# Patient Record
Sex: FEMALE | Race: WHITE | NOT HISPANIC OR LATINO | Employment: OTHER | URBAN - METROPOLITAN AREA
[De-identification: names, ages, dates, MRNs, and addresses within clinical notes are randomized per-mention and may not be internally consistent; named-entity substitution may affect disease eponyms.]

---

## 2017-01-23 ENCOUNTER — PATIENT OUTREACH (OUTPATIENT)
Dept: HEALTH INFORMATION MANAGEMENT | Facility: OTHER | Age: 72
End: 2017-01-23

## 2017-03-08 ENCOUNTER — OFFICE VISIT (OUTPATIENT)
Dept: MEDICAL GROUP | Facility: MEDICAL CENTER | Age: 72
End: 2017-03-08
Payer: MEDICARE

## 2017-03-08 VITALS
DIASTOLIC BLOOD PRESSURE: 58 MMHG | OXYGEN SATURATION: 96 % | TEMPERATURE: 97.2 F | WEIGHT: 126 LBS | SYSTOLIC BLOOD PRESSURE: 120 MMHG | BODY MASS INDEX: 23.19 KG/M2 | HEIGHT: 62 IN | HEART RATE: 71 BPM

## 2017-03-08 DIAGNOSIS — Z12.31 ENCOUNTER FOR SCREENING MAMMOGRAM FOR MALIGNANT NEOPLASM OF BREAST: ICD-10-CM

## 2017-03-08 DIAGNOSIS — E78.5 HYPERLIPIDEMIA LDL GOAL <100: ICD-10-CM

## 2017-03-08 DIAGNOSIS — M75.41 SHOULDER IMPINGEMENT SYNDROME, RIGHT: ICD-10-CM

## 2017-03-08 DIAGNOSIS — G47.33 OSA (OBSTRUCTIVE SLEEP APNEA): ICD-10-CM

## 2017-03-08 DIAGNOSIS — M75.121 COMPLETE TEAR OF RIGHT ROTATOR CUFF: ICD-10-CM

## 2017-03-08 DIAGNOSIS — S52.102D CLOSED FRACTURE OF PROXIMAL END OF LEFT RADIUS WITH ROUTINE HEALING, UNSPECIFIED FRACTURE MORPHOLOGY, SUBSEQUENT ENCOUNTER: ICD-10-CM

## 2017-03-08 DIAGNOSIS — H81.13 BPPV (BENIGN PAROXYSMAL POSITIONAL VERTIGO), BILATERAL: ICD-10-CM

## 2017-03-08 PROCEDURE — G0439 PPPS, SUBSEQ VISIT: HCPCS | Performed by: NURSE PRACTITIONER

## 2017-03-08 ASSESSMENT — PATIENT HEALTH QUESTIONNAIRE - PHQ9: CLINICAL INTERPRETATION OF PHQ2 SCORE: 0

## 2017-03-08 ASSESSMENT — PAIN SCALES - GENERAL: PAINLEVEL: NO PAIN

## 2017-03-08 NOTE — ASSESSMENT & PLAN NOTE
She is followed by pulm for her sleep apnea.  Well controlled on CPAP.  Last saw pulm with updated equip in jan.

## 2017-03-08 NOTE — ASSESSMENT & PLAN NOTE
She has healed from her surgery on 3/1/16.  She is  Now ready to restart all activitiy.  Her surgeon was Omar

## 2017-03-08 NOTE — PROGRESS NOTES
Subjective:      Hallie Fisher is a 72 y.o. female who presents with No chief complaint on file.            HPI  Seen in f/u for HRA.  She just back from a week of skiing.  She is having left side pain.  Using aleve.  Getting better.  No SOB.  Didn't fall with skiing.    She went to [place] this year with big group.  She is going to start playing tennis again. S he is going to restart tennis now that her shoulder is healed.  Is going to restart pickle ball.   She is on weight watchers long term.  She is stable on her diet.    She is having a lot of stress wiht her ill mother in law in hospice.    She is due refills on meds  She is due lab for her chol.    She is due mammo.      Hyperlipidemia LDL goal <100  Her last LP in 2015 showed good trg and HDL.  LDL was elevated at 118.  She is taking her lipitor approp.  No se to meds.  She is due updated lab.     MONIK (obstructive sleep apnea)  She is followed by pulm for her sleep apnea.  Well controlled on CPAP.  Last saw pulm with updated equip in jan.      Closed fracture of left radius with routine healing  All sx resolved.  No current sx or tx needed    Torn rotator cuff  She has healed from her surgery on 3/1/16.  She is  Now ready to restart all activitiy.  Her surgeon was Omar MOCTEZUMAPV (benign paroxysmal positional vertigo)  No recent attacks.  No current tx needed    Shoulder impingement syndrome  All sx resolved with surgery 3/1/16            HPI:  Hallie is a 72 y.o. here for Medicare Annual Wellness Visit     Patient Active Problem List    Diagnosis Date Noted   • Shoulder impingement syndrome 03/01/2016   • Torn rotator cuff    • BPPV (benign paroxysmal positional vertigo)    • Closed fracture of left radius with routine healing 09/04/2013   • MONIK (obstructive sleep apnea) 08/19/2013   • Hyperlipidemia        Current Outpatient Prescriptions   Medication Sig Dispense Refill   • atorvastatin (LIPITOR) 10 MG Tab Take 1 Tab by mouth every day.  (Patient taking differently: Take 10 mg by mouth every 48 hours.) 30 Tab 11   • ascorbic acid (VITAMIN C) 500 MG tablet Take 1 Tab by mouth every day. (Patient taking differently: Take 500 mg by mouth every 48 hours.) 30 Each 3   • Multiple Vitamins-Minerals (CENTRUM SILVER) TABS Take  by mouth every day.     • Magnesium 400 MG CAPS Take  by mouth every 48 hours as needed.     • Calcium Carbonate-Vitamin D (CALCIUM 600 + D PO) Take  by mouth every day.     • Cholecalciferol (VITAMIN D) 2000 UNITS CAPS Take  by mouth every day.     • meclizine (ANTIVERT) 25 MG TABS Take 1 Tab by mouth 3 times a day as needed for Dizziness. 60 Tab 2     No current facility-administered medications for this visit.            Current supplements as per medication list.       Allergies: Penicillins    Current social contact/activities:   1.  Skiing  2.  Walking  3.  tennis     She  reports that she has never smoked. She has never used smokeless tobacco. She reports that she drinks alcohol. She reports that she does not use illicit drugs.  Counseling given: Yes        DPA/Advanced directive: Patient does have an advanced directive. If not on file, instructed to bring in a copy to scan into their chart. If no advanced directive exists, a packet and workshop information was given on advanced directives.     ROS:    Gait: Uses no assistive device   Ostomy: no   Other tubes: no   Amputations: no   Chronic oxygen use no - uses CPAP  Last eye exam 2 months ago   : Denies incontinence.       Screening:    Depression Screening    Little interest or pleasure in doing things?  0 - not at all  Feeling down, depressed , or hopeless? 0 - not at all  Trouble falling or staying asleep, or sleeping too much?     Feeling tired or having little energy?     Poor appetite or overeating?     Feeling bad about yourself - or that you are a failure or have let yourself or your family down?    Trouble concentrating on things, such as reading the newspaper or  watching television?    Moving or speaking so slowly that other people could have noticed.  Or the opposite - being so fidgety or restless that you have been moving around a lot more than usual?     Thoughts that you would be better off dead, or of hurting yourself?     Patient Health Questionnaire Score:      If depressive symptoms identified deferred to follow up visit unless specifically addressed in assesment and plan.      Screening for Cognitive Impairment    Three Minute Recall (banana, sunrise, fence)  3/3    Draw clock face with all 12 numbers set to the hand to show 10 minures past 11 o'clock  1    Cognitive concerns identified defferred for follow up unless specifically addressed in assesment and plan.    Fall Risk Assessment    Has the patient had two or more falls in the last year or any fall with injury in the last year?  No    Safety Assessment    Throw rugs on floor.  Yes  Handrails on all stairs.  Yes  Good lighting in all hallways.  Yes  Difficulty hearing.  No  Patient counseled about all safety risks that were identified.    Functional Assessment ADLs    Are there any barriers preventing you from cooking for yourself or meeting nutritional needs?  No.    Are there any barriers preventing you from driving safely or obtaining transportation?  No.    Are there any barriers preventing you from using a telephone or calling for help?  No.    Are there any barriers preventing you from shopping?  No.    Are there any barriers preventing you from taking care of your own finances?  No.    Are there any barriers preventing you from managing your medications?  No.    Are currently engaing any exercise or physical activity?  Yes.       Health Maintenance Summary                Annual Wellness Visit Overdue 5/30/2015      Done 5/29/2014     MAMMOGRAM Postponed 1/23/2018 Originally 11/6/2016. Patient Refused     Done 11/6/2015 MA-SCREEN MAMMO W/CAD-BILAT     Patient has more history with this topic...    BONE  DENSITY Next Due 11/6/2020      Done 11/6/2015 DS-BONE DENSITY STUDY (DEXA)     Patient has more history with this topic...    COLONOSCOPY Next Due 7/11/2022      Done 7/11/2012 REFERRAL TO GI FOR COLONOSCOPY     Patient has more history with this topic...    IMM DTaP/Tdap/Td Vaccine Next Due 8/19/2023      Done 8/19/2013 Imm Admin: Tdap Vaccine          Patient Care Team:  BLAKE Pang as PCP - General (Family Medicine)  BLAKE Mckeon as Consulting Physician (Family Medicine)  Cayetano as Respiratory      Social History   Substance Use Topics   • Smoking status: Never Smoker    • Smokeless tobacco: Never Used   • Alcohol Use: Yes      Comment: one per week or once a month      Family History   Problem Relation Age of Onset   • Lung Disease Mother      emphysema   • Alcohol/Drug Mother    • Cancer Father      colon   • Alcohol/Drug Father    • Stroke Brother      She  has a past medical history of Hyperlipidemia; Sleep apnea; Closed fracture of radius; Torn rotator cuff; BPPV (benign paroxysmal positional vertigo); Anesthesia; and Shoulder impingement syndrome.   Past Surgical History   Procedure Laterality Date   • Tonsillectomy  as child   • Knee arthroscopy Left 2009      - Dr. Newman; left   • Wrist orif  9/4/2013     Performed by Prasanna Anthony M.D. at [facility]   • Carpal tunnel release  9/4/2013     Performed by Prasanna Anthony M.D. at [facility]   • Lumpectomy Right 1970's     right breast cyst removal   • Shoulder decompression arthroscopic Right 3/1/2016     Procedure: SHOULDER DECOMPRESSION ARTHROSCOPIC - SUBACROMIAL, LABRAL DEBRIDMENT;  Surgeon: Shama Nelson M.D.;  Location: [facility];  Service:    • Shoulder arthroscopy w/ rotator cuff repair Right 3/1/2016     Procedure: SHOULDER ARTHROSCOPY W/ ROTATOR CUFF REPAIR;  Surgeon: Shama Nelson M.D.;  Location: [facility];  Service:        Exam:     Blood pressure 120/58,  "pulse 71, temperature 36.2 °C (97.2 °F), height 1.562 m (5' 1.5\"), weight 57.153 kg (126 lb), SpO2 96 %, not currently breastfeeding. Body mass index is 23.42 kg/(m^2).    Hearing excellent.    Dentition good  Alert, oriented in no acute distress.  Eye contact is good, speech goal directed, affect calm      Services needed: as per orders if indicated.  Health Care Screening: Age-appropriate preventive services Medicare covers discussed today and ordered if indicated.    Referrals offered: Community-based lifestyle interventions to reduce health risks and promote self-management and wellness, fall prevention, nutrition, physical activity, tobacco-use cessation, weight loss, and mental health services as per orders if indicated.    Discussion today about general wellness and lifestyle habits:    · Prevent falls and reduce trip hazards; Cautioned about securing or removing rugs.  · Have a working fire alarm and carbon monoxide detector;   · Engage in regular physical activity and social activities       Patient Active Problem List    Diagnosis Date Noted   • Shoulder impingement syndrome 03/01/2016   • Torn rotator cuff    • BPPV (benign paroxysmal positional vertigo)    • Closed fracture of left radius with routine healing 09/04/2013   • MONIK (obstructive sleep apnea) 08/19/2013   • Hyperlipidemia      Current Outpatient Prescriptions   Medication Sig Dispense Refill   • atorvastatin (LIPITOR) 10 MG Tab Take 1 Tab by mouth every day. (Patient taking differently: Take 10 mg by mouth every 48 hours.) 30 Tab 11   • ascorbic acid (VITAMIN C) 500 MG tablet Take 1 Tab by mouth every day. (Patient taking differently: Take 500 mg by mouth every 48 hours.) 30 Each 3   • Multiple Vitamins-Minerals (CENTRUM SILVER) TABS Take  by mouth every day.     • Magnesium 400 MG CAPS Take  by mouth every 48 hours as needed.     • Calcium Carbonate-Vitamin D (CALCIUM 600 + D PO) Take  by mouth every day.     • Cholecalciferol (VITAMIN D) " "2000 UNITS CAPS Take  by mouth every day.     • meclizine (ANTIVERT) 25 MG TABS Take 1 Tab by mouth 3 times a day as needed for Dizziness. 60 Tab 2     No current facility-administered medications for this visit.     Allergies   Allergen Reactions   • Penicillins Hives       ROS  Review of Systems   Constitutional: Negative.  Negative for fever, chills, weight loss, malaise/fatigue and diaphoresis.   HENT: Negative.  Negative for hearing loss, ear pain, nosebleeds, congestion, sore throat, neck pain, tinnitus and ear discharge.    Eyes: Negative.  Negative for blurred vision, double vision, photophobia, pain, discharge and redness.   Respiratory: Negative.  Negative for cough, hemoptysis, sputum production, shortness of breath, wheezing and stridor.    Cardiovascular: Negative.  Negative for chest pain, palpitations, orthopnea, claudication, leg swelling and PND.   Gastrointestinal: Negative.  Negative for heartburn, nausea, vomiting, abdominal pain, diarrhea, constipation, blood in stool and melena.  freq belching with eating with inc stress.   Genitourinary: Negative.  Negative for dysuria, urgency, frequency, incontinence, hematuria and flank pain.   Musculoskeletal: Negative.  Negative for myalgias, joint pain and falls.   Skin: Negative.  Negative for itching and rash.   Neurological: Negative.  Negative for dizziness, tingling, tremors, sensory change, speech change, focal weakness, seizures, loss of consciousness, weakness and headaches.   Endo/Heme/Allergies: Negative.  Negative for environmental allergies and polydipsia. Does not bruise/bleed easily.   Psychiatric/Behavioral: Negative.  Negative for depression, suicidal ideas, hallucinations, memory loss and substance abuse. The patient is some nervous/anxious and insomnia d/t recent family stress.    All other systems reviewed and are negative.           Objective:     /58 mmHg  Pulse 71  Temp(Src) 36.2 °C (97.2 °F)  Ht 1.562 m (5' 1.5\")  Wt " 57.153 kg (126 lb)  BMI 23.42 kg/m2  SpO2 96%  Breastfeeding? No     Physical Exam      Physical Exam   Vitals reviewed.  Constitutional: oriented to person, place, and time. appears well-developed and well-nourished. No distress.   HENT: Head: Normocephalic and atraumatic. Bilateral tympanic membranes wnl w/o bulging.  Right Ear: External ear normal. Left Ear: External ear normal. Nose: Nose normal.  Mouth/Throat: Oropharynx is clear and moist. No oropharyngeal exudate. nedra tm wnl. Eyes: Conjunctivae and EOM are normal. Pupils are equal, round, and reactive to light. Right eye exhibits no discharge. Left eye exhibits no discharge. No scleral icterus.    Neck: Normal range of motion. Neck supple. No JVD present.   Cardiovascular: Normal rate, regular rhythm, normal heart sounds and intact distal pulses.  Exam reveals no gallop and no friction rub.  No murmur heard.  No carotid bruits   Pulmonary/Chest: Effort normal and breath sounds normal. No stridor. No respiratory distress. no wheezes or rales. exhibits no tenderness.   Abdominal: Soft. Bowel sounds are normal. exhibits no distension and no mass. No tenderness. no rebound and no guarding.   Musculoskeletal: Normal range of motion. exhibits no edema or tenderness.  nedra pedal pulses 2+.  Lymphadenopathy:  no cervical or supraclavicular adenopathy.   Neurological: alert and oriented to person, place, and time. has normal reflexes. displays normal reflexes. No cranial nerve deficit. exhibits normal muscle tone. Coordination normal.   Skin: Skin is warm and dry. No rash noted. no diaphoresis. No erythema. No pallor.   Psychiatric: normal mood and affect. behavior is normal.            Assessment/Plan:     1. Hyperlipidemia LDL goal <100  Annual Wellness Visit - Includes PPPS Subsequent ()    COMP METABOLIC PANEL    LIPID PROFILE    recheck lab.  taking meds approp.  f/u with pt with results and yearly or sooner if lab abn   2. MONIK (obstructive sleep apnea)   Annual Wellness Visit - Includes PPPS Subsequent ()    controlled and followed by pulm   3. Closed fracture of proximal end of left radius with routine healing, unspecified fracture morphology, subsequent encounter  Annual Wellness Visit - Includes PPPS Subsequent ()    all sx resolved.  no tx needed   4. Complete tear of right rotator cuff  Annual Wellness Visit - Includes PPPS Subsequent ()    all sx resolved.  no tx needed   5. BPPV (benign paroxysmal positional vertigo), bilateral  Annual Wellness Visit - Includes PPPS Subsequent ()    all sx resolved.  no tx needed   6. Shoulder impingement syndrome, right  Annual Wellness Visit - Includes PPPS Subsequent ()    all sx resolved.  no tx needed   7. Encounter for screening mammogram for malignant neoplasm of breast  MA-SCREEN MAMMO W/CAD-BILAT

## 2017-03-08 NOTE — ASSESSMENT & PLAN NOTE
Her last LP in 2015 showed good trg and HDL.  LDL was elevated at 118.  She is taking her lipitor approp.  No se to meds.  She is due updated lab.

## 2017-03-08 NOTE — MR AVS SNAPSHOT
"        Hallie Moet   3/8/2017 10:00 AM   Office Visit   MRN: [id number]    Department:  [facility]   Dept Phone:  [phone]    Description:  Female : 1945   Provider:  BLAKE Pang           Allergies as of 3/8/2017     Allergen Noted Reactions    Penicillins 2016   Hives      You were diagnosed with     Hyperlipidemia LDL goal <100   [306970]   recheck lab.  taking meds approp.  f/u with pt with results and yearly or sooner if lab abn    MONIK (obstructive sleep apnea)   [[id number]]   controlled and followed by pulm    Closed fracture of proximal end of left radius with routine healing, unspecified fracture morphology, subsequent encounter   [[id number]]   all sx resolved.  no tx needed    Complete tear of right rotator cuff   [[id number]]   all sx resolved.  no tx needed    BPPV (benign paroxysmal positional vertigo), bilateral   [[id number]]   all sx resolved.  no tx needed    Shoulder impingement syndrome, right   [[id number]]   all sx resolved.  no tx needed    Encounter for screening mammogram for malignant neoplasm of breast   [[id number]]         Vital Signs     Blood Pressure Pulse Temperature Height Weight Body Mass Index    120/58 mmHg 71 36.2 °C (97.2 °F) 1.562 m (5' 1.5\") 57.153 kg (126 lb) 23.42 kg/m2    Oxygen Saturation Breastfeeding? Smoking Status             96% No Never Smoker          Basic Information     Date Of Birth Sex Race Ethnicity Preferred Language    1945 Female White Non- English      Problem List              ICD-10-CM Priority Class Noted - Resolved    MONIK (obstructive sleep apnea) G47.33   2013 - Present    Closed fracture of left radius with routine healing S52.92XD   2013 - Present    Torn rotator cuff M75.100   Unknown - Present    BPPV (benign paroxysmal positional vertigo) H81.10   Unknown - Present    Shoulder impingement syndrome M75.40   3/1/2016 - Present    Hyperlipidemia LDL goal <100 E78.5   3/8/2017 - Present      Health " Maintenance        Date Due Completion Dates    MAMMOGRAM 1/23/2018 (Originally 11/6/2016) 11/6/2015, 6/5/2014, 5/11/2012, 11/18/2009, 11/18/2009    BONE DENSITY 11/6/2020 11/6/2015, 5/11/2012    COLONOSCOPY 7/11/2022 7/11/2012, 1/1/2012 (Done)    Override on 1/1/2012: Done    IMM DTaP/Tdap/Td Vaccine (2 - Td) 8/19/2023 8/19/2013            Current Immunizations     13-VALENT PCV PREVNAR 2/19/2016    Influenza Vaccine Adult HD 9/11/2015, 9/27/2014    Influenza Vaccine Quad Inj (Pf) 10/12/2016    Pneumococcal polysaccharide vaccine (PPSV-23) 8/19/2013    SHINGLES VACCINE 5/23/2014    Tdap Vaccine 8/19/2013      Below and/or attached are the medications your provider expects you to take. Review all of your home medications and newly ordered medications with your provider and/or pharmacist. Follow medication instructions as directed by your provider and/or pharmacist. Please keep your medication list with you and share with your provider. Update the information when medications are discontinued, doses are changed, or new medications (including over-the-counter products) are added; and carry medication information at all times in the event of emergency situations     Allergies:  PENICILLINS - Hives               Medications  Valid as of: March 08, 2017 - 11:11 AM    Generic Name Brand Name Tablet Size Instructions for use    Ascorbic Acid (Tab) VITAMIN C 500 MG Take 1 Tab by mouth every day.        Atorvastatin Calcium (Tab) LIPITOR 10 MG Take 1 Tab by mouth every day.        Calcium Carb-Cholecalciferol   Take  by mouth every day.        Cholecalciferol (Cap) Vitamin D 2000 UNITS Take  by mouth every day.        Magnesium (Cap) Magnesium 400 MG Take  by mouth every 48 hours as needed.        Meclizine HCl (Tab) ANTIVERT 25 MG Take 1 Tab by mouth 3 times a day as needed for Dizziness.        Multiple Vitamins-Minerals (Tab) CENTRUM SILVER  Take  by mouth every day.        .                 Medicines prescribed today  were sent to:     [id number] - [place], [place] - [address]    [address] [place] [address]    Phone: [phone] Fax: [phone]    Open 24 Hours?: No      Medication refill instructions:       If your prescription bottle indicates you have medication refills left, it is not necessary to call your provider’s office. Please contact your pharmacy and they will refill your medication.    If your prescription bottle indicates you do not have any refills left, you may request refills at any time through one of the following ways: The online iNeed system (except Urgent Care), by calling your provider’s office, or by asking your pharmacy to contact your provider’s office with a refill request. Medication refills are processed only during regular business hours and may not be available until the next business day. Your provider may request additional information or to have a follow-up visit with you prior to refilling your medication.   *Please Note: Medication refills are assigned a new Rx number when refilled electronically. Your pharmacy may indicate that no refills were authorized even though a new prescription for the same medication is available at the pharmacy. Please request the medicine by name with the pharmacy before contacting your provider for a refill.        Your To Do List     Future Labs/Procedures Complete By Expires    COMP METABOLIC PANEL  As directed 3/9/2018    LIPID PROFILE  As directed 3/9/2018    MA-SCREEN MAMMO W/CAD-BILAT  As directed 4/9/2018      Other Notes About Your Plan     This patient has an appointment on 02/19/2016. There are no queries at this time.           iNeed Access Code: Activation code not generated  Current iNeed Status: Active

## 2017-03-10 ENCOUNTER — HOSPITAL ENCOUNTER (OUTPATIENT)
Dept: LAB | Facility: MEDICAL CENTER | Age: 72
End: 2017-03-10
Attending: NURSE PRACTITIONER
Payer: MEDICARE

## 2017-03-10 DIAGNOSIS — E78.5 HYPERLIPIDEMIA, UNSPECIFIED HYPERLIPIDEMIA TYPE: ICD-10-CM

## 2017-03-10 DIAGNOSIS — H81.10 BPPV (BENIGN PAROXYSMAL POSITIONAL VERTIGO), UNSPECIFIED LATERALITY: ICD-10-CM

## 2017-03-10 DIAGNOSIS — E78.5 HYPERLIPIDEMIA LDL GOAL <100: ICD-10-CM

## 2017-03-10 LAB
ALBUMIN SERPL BCP-MCNC: 4.2 G/DL (ref 3.2–4.9)
ALBUMIN/GLOB SERPL: 1.9 G/DL
ALP SERPL-CCNC: 92 U/L (ref 30–99)
ALT SERPL-CCNC: 15 U/L (ref 2–50)
ANION GAP SERPL CALC-SCNC: 5 MMOL/L (ref 0–11.9)
AST SERPL-CCNC: 20 U/L (ref 12–45)
BILIRUB SERPL-MCNC: 0.4 MG/DL (ref 0.1–1.5)
BUN SERPL-MCNC: 13 MG/DL (ref 8–22)
CALCIUM SERPL-MCNC: 9.5 MG/DL (ref 8.5–10.5)
CHLORIDE SERPL-SCNC: 107 MMOL/L (ref 96–112)
CHOLEST SERPL-MCNC: 183 MG/DL (ref 100–199)
CO2 SERPL-SCNC: 27 MMOL/L (ref 20–33)
CREAT SERPL-MCNC: 0.77 MG/DL (ref 0.5–1.4)
GLOBULIN SER CALC-MCNC: 2.2 G/DL (ref 1.9–3.5)
GLUCOSE SERPL-MCNC: 90 MG/DL (ref 65–99)
HDLC SERPL-MCNC: 56 MG/DL
LDLC SERPL CALC-MCNC: 104 MG/DL
POTASSIUM SERPL-SCNC: 4.3 MMOL/L (ref 3.6–5.5)
PROT SERPL-MCNC: 6.4 G/DL (ref 6–8.2)
SODIUM SERPL-SCNC: 139 MMOL/L (ref 135–145)
TRIGL SERPL-MCNC: 115 MG/DL (ref 0–149)

## 2017-03-10 PROCEDURE — 80053 COMPREHEN METABOLIC PANEL: CPT

## 2017-03-10 PROCEDURE — 36415 COLL VENOUS BLD VENIPUNCTURE: CPT

## 2017-03-10 PROCEDURE — 80061 LIPID PANEL: CPT

## 2017-03-10 RX ORDER — ATORVASTATIN CALCIUM 10 MG/1
10 TABLET, FILM COATED ORAL DAILY
Qty: 90 TAB | Refills: 0 | Status: SHIPPED | OUTPATIENT
Start: 2017-03-10 | End: 2017-06-05 | Stop reason: SDUPTHER

## 2017-03-10 NOTE — TELEPHONE ENCOUNTER
Was the patient seen in the last year in this department? Yes     Does patient have an active prescription for medications requested? No     Received Request Via: Patient     Call back number: [phone]

## 2017-03-13 ENCOUNTER — TELEPHONE (OUTPATIENT)
Dept: MEDICAL GROUP | Facility: MEDICAL CENTER | Age: 72
End: 2017-03-13

## 2017-03-13 DIAGNOSIS — E78.5 HYPERLIPIDEMIA, UNSPECIFIED HYPERLIPIDEMIA TYPE: ICD-10-CM

## 2017-03-13 DIAGNOSIS — H81.10 BPPV (BENIGN PAROXYSMAL POSITIONAL VERTIGO), UNSPECIFIED LATERALITY: ICD-10-CM

## 2017-03-13 RX ORDER — ATORVASTATIN CALCIUM 10 MG/1
10 TABLET, FILM COATED ORAL DAILY
Qty: 90 TAB | Refills: 3 | Status: SHIPPED | OUTPATIENT
Start: 2017-03-13 | End: 2018-02-20 | Stop reason: SDUPTHER

## 2017-03-23 ENCOUNTER — HOSPITAL ENCOUNTER (OUTPATIENT)
Dept: RADIOLOGY | Facility: MEDICAL CENTER | Age: 72
End: 2017-03-23
Attending: NURSE PRACTITIONER
Payer: MEDICARE

## 2017-03-23 DIAGNOSIS — Z12.31 ENCOUNTER FOR SCREENING MAMMOGRAM FOR MALIGNANT NEOPLASM OF BREAST: ICD-10-CM

## 2017-03-23 PROCEDURE — 77063 BREAST TOMOSYNTHESIS BI: CPT

## 2017-03-25 ENCOUNTER — TELEPHONE (OUTPATIENT)
Dept: MEDICAL GROUP | Facility: MEDICAL CENTER | Age: 72
End: 2017-03-25

## 2017-03-25 NOTE — TELEPHONE ENCOUNTER
Your mammogram shows dense breasts.  That may hide some masses.  We recommend a sono cine.  That is a way to look more closely at the breast tissue.  Typically insurance does not pay for this test.  It costs about $125.00.  Let me know if she would like me to order this test.

## 2017-06-05 RX ORDER — ATORVASTATIN CALCIUM 10 MG/1
TABLET, FILM COATED ORAL
Qty: 90 TAB | Refills: 2 | Status: SHIPPED | OUTPATIENT
Start: 2017-06-05 | End: 2018-02-20

## 2017-11-14 ENCOUNTER — APPOINTMENT (OUTPATIENT)
Dept: SLEEP MEDICINE | Facility: MEDICAL CENTER | Age: 72
End: 2017-11-14
Payer: MEDICARE

## 2017-11-15 ENCOUNTER — SLEEP CENTER VISIT (OUTPATIENT)
Dept: SLEEP MEDICINE | Facility: MEDICAL CENTER | Age: 72
End: 2017-11-15
Payer: MEDICARE

## 2017-11-15 VITALS
BODY MASS INDEX: 23.19 KG/M2 | HEIGHT: 62 IN | HEART RATE: 85 BPM | RESPIRATION RATE: 16 BRPM | SYSTOLIC BLOOD PRESSURE: 128 MMHG | DIASTOLIC BLOOD PRESSURE: 70 MMHG | WEIGHT: 126 LBS | OXYGEN SATURATION: 95 %

## 2017-11-15 DIAGNOSIS — G47.33 OBSTRUCTIVE SLEEP APNEA: ICD-10-CM

## 2017-11-15 PROCEDURE — 99213 OFFICE O/P EST LOW 20 MIN: CPT | Performed by: INTERNAL MEDICINE

## 2017-11-15 NOTE — PROGRESS NOTES
Hallie Fisher is a 72 y.o. female here for obstructive sleep apnea.    History of Present Illness:    The patient's a 72-year-old female with severe sleep apnea. She has an apnea hypopnea index of 41 per hour and a low oxygen saturation of 80%. She is on an AutoPap machine set between 6 and 12 cm of water. She is averaging 6 hours and 12 minutes a night with the machine. The apnea hypopnea index is 3.1 per hour. Leak is minimal. The patient says that she sleeping well and she wakes up rested and she has good energy during the daytime. She has no complaints related to the mask or the air pressure.    Constitutional:  Negative for fever, chills, sweats, and fatigue.  Eyes:  Negative for eye pain and visual changes.  HENT:  Negative for tinnitus and hoarse voice.  Cardiovascular:  Negative for chest pain, leg swelling, syncope and orthopnea.  Respiratory:  See HPI for pertinent negatives  Sleep:  Negative for somnolence, loud snoring, sleep disturbance due to breathing, insomnia.  Gastrointestinal:  Negative for dysphagia, nausea and abdominal pain.  Heme/lymph:  Denies easy bruising, blood clots.  Musculoskeletal:  Negative for arthralgias, sore muscles and back pain.  Skin:  Negative for rash and color change.  Neurological:  Negative for headaches, lightheadedness and weakness.  Psychiatric:  Denies depression.    Current Outpatient Prescriptions   Medication Sig Dispense Refill   • atorvastatin (LIPITOR) 10 MG Tab Take 1 Tab by mouth every day. 90 Tab 3   • ascorbic acid (VITAMIN C) 500 MG tablet Take 1 Tab by mouth every day. (Patient taking differently: Take 500 mg by mouth every 48 hours.) 30 Each 3   • Multiple Vitamins-Minerals (CENTRUM SILVER) TABS Take  by mouth every day.     • Magnesium 400 MG CAPS Take  by mouth every 48 hours as needed.     • Calcium Carbonate-Vitamin D (CALCIUM 600 + D PO) Take  by mouth every day.     • Cholecalciferol (VITAMIN D) 2000 UNITS CAPS Take  by mouth every day.     •  "meclizine (ANTIVERT) 25 MG TABS Take 1 Tab by mouth 3 times a day as needed for Dizziness. 60 Tab 2   • atorvastatin (LIPITOR) 10 MG Tab TAKE 1 TAB BY MOUTH EVERY DAY. 90 Tab 2     No current facility-administered medications for this visit.        Social History   Substance Use Topics   • Smoking status: Never Smoker   • Smokeless tobacco: Never Used   • Alcohol use Yes      Comment: one per week or once a month        Past Medical History:   Diagnosis Date   • Anesthesia     PONV   • BPPV (benign paroxysmal positional vertigo)    • Closed fracture of radius    • Hyperlipidemia    • Shoulder impingement syndrome    • Sleep apnea     CPAP - PMA   • Torn rotator cuff     right.  has seen Omar and will have praveen in 1/16       Past Surgical History:   Procedure Laterality Date   • SHOULDER DECOMPRESSION ARTHROSCOPIC Right 3/1/2016    Procedure: SHOULDER DECOMPRESSION ARTHROSCOPIC - SUBACROMIAL, LABRAL DEBRIDMENT;  Surgeon: Shama Nelson M.D.;  Location: [facility];  Service:    • SHOULDER ARTHROSCOPY W/ ROTATOR CUFF REPAIR Right 3/1/2016    Procedure: SHOULDER ARTHROSCOPY W/ ROTATOR CUFF REPAIR;  Surgeon: Shama Nelson M.D.;  Location: [facility];  Service:    • WRIST ORIF  9/4/2013    Performed by Prasanna Anthony M.D. at [facility]   • CARPAL TUNNEL RELEASE  9/4/2013    Performed by Prasanna Anthony M.D. at [facility]   • KNEE ARTHROSCOPY Left 2009     - Dr. Newman; left   • LUMPECTOMY Right 1970's    right breast cyst removal   • TONSILLECTOMY  as child       Allergies:  Penicillins    Family History   Problem Relation Age of Onset   • Lung Disease Mother      emphysema   • Alcohol/Drug Mother    • Cancer Father      colon   • Alcohol/Drug Father    • Stroke Brother        Physical Examination    Vitals:    11/15/17 0836   Height: 1.562 m (5' 1.5\")   Weight: 57.2 kg (126 lb)   Weight % change since last entry.: 0 %   BP: 128/70   Pulse: 85 "   BMI (Calculated): 23.42   Resp: 16   O2 sat % room air: 95 %       Physical Exam:  Constitutional:  Well developed and well nourished.  Head:  Normocephalic and atraumatic.  Nose:  Nose normal.  Mouth/Throat:  Oropharynx is clear and moist, no lesions.    Neck:  Normal range of motion.  Supple.  No JVD.  Cardiovascular:  Normal rate, regular rhythm, normal heart sounds. No edema  Pulmonary/Chest: No wheezing, rales or rhonchi.  Respiratory effort non labored  Musculoskeletal.  No muscular atrophy.  Lymphadenopathy:  No cervical or supraclavicular adenopathy  Neurological:  Alert and oriented.  Cranial nerves intact.  No focal deficits  Skin:  No rashes or ulcers.  Psyciatric:  Normal mood and affect.    Assessment and Plan:  1. Obstructive sleep apnea  The patient has severe sleep apnea. She is doing very well with AutoPap between 6 and 12 cm water. She has excellent compliance and the current settings are efficacious. There is no indication to make any adjustments at this time. I have recommended one year follow-up.        Followup Return in about 1 year (around 11/15/2018) for follow up visit with DI.

## 2018-01-11 ENCOUNTER — PATIENT OUTREACH (OUTPATIENT)
Dept: HEALTH INFORMATION MANAGEMENT | Facility: OTHER | Age: 73
End: 2018-01-11

## 2018-01-11 NOTE — PROGRESS NOTES
1. Attempt #: 1    2. [facility] Verified: yes    3. Verify PCP: yes    4. Care Team Updated:       •   DME Company (gait device, O2, CPAP, etc.): YES       •   Other Specialists (eye doctor, derm, GYN, cardiology, endo, etc): YES    5.  Reviewed/Updated the following with patient:       •   Communication Preference Obtained? YES       •   Preferred Pharmacy? YES       •   Preferred Lab? YES       •   Family History (document living status of immediate family members and if + hx of cancer, diabetes, hypertension, hyperlipidemia, heart attack, stroke) YES. Was Abstract Encounter opened and chart updated? YES    6. [employer] Activation: already active    7. [employer] Dagmar: no    8. Annual Wellness Visit Scheduling  Scheduling Status:Scheduled      9. Care Gap Scheduling (Attempt to Schedule EACH Overdue Care Gap!)     Health Maintenance Due   Topic Date Due   • Annual Wellness Visit  05/30/2015   • COLONOSCOPY  07/11/2017   • IMM INFLUENZA (1) 09/01/2017        Scheduled patient for Annual Wellness Visit      10. Patient was advised: “This is a free wellness visit. The provider will screen for medical conditions to help you stay healthy. If you have other concerns to address you may be asked to discuss these at a separate visit or there may be an additional fee.”     11. Patient was informed to arrive 15 min prior to their scheduled appointment and bring in their medication bottles.

## 2018-01-12 ENCOUNTER — TELEPHONE (OUTPATIENT)
Dept: SLEEP MEDICINE | Facility: MEDICAL CENTER | Age: 73
End: 2018-01-12

## 2018-01-12 DIAGNOSIS — G47.33 OSA (OBSTRUCTIVE SLEEP APNEA): ICD-10-CM

## 2018-01-12 NOTE — TELEPHONE ENCOUNTER
Was seen 11/15/2018 for follow up and discussed a new machine. She called [facility] and was told they did not receive any orderes from us. She was told a ne rx, sleep study & pap settings needs to be faxed to them in order for her to get a new machine.

## 2018-01-13 NOTE — TELEPHONE ENCOUNTER
Please sign order for new CPAP machine if ok.   DME:  [facility] /  [phone] / fax [phone]     Last saw Ricardo.

## 2018-02-20 ENCOUNTER — OFFICE VISIT (OUTPATIENT)
Dept: MEDICAL GROUP | Facility: MEDICAL CENTER | Age: 73
End: 2018-02-20
Payer: MEDICARE

## 2018-02-20 VITALS
DIASTOLIC BLOOD PRESSURE: 62 MMHG | BODY MASS INDEX: 22.71 KG/M2 | OXYGEN SATURATION: 94 % | TEMPERATURE: 97.8 F | SYSTOLIC BLOOD PRESSURE: 118 MMHG | WEIGHT: 123.4 LBS | HEART RATE: 72 BPM | HEIGHT: 62 IN

## 2018-02-20 DIAGNOSIS — G47.33 OSA (OBSTRUCTIVE SLEEP APNEA): ICD-10-CM

## 2018-02-20 DIAGNOSIS — S52.102D CLOSED FRACTURE OF PROXIMAL END OF LEFT RADIUS WITH ROUTINE HEALING, UNSPECIFIED FRACTURE MORPHOLOGY, SUBSEQUENT ENCOUNTER: ICD-10-CM

## 2018-02-20 DIAGNOSIS — H81.10 BPPV (BENIGN PAROXYSMAL POSITIONAL VERTIGO), UNSPECIFIED LATERALITY: ICD-10-CM

## 2018-02-20 DIAGNOSIS — Z12.11 SCREEN FOR COLON CANCER: ICD-10-CM

## 2018-02-20 DIAGNOSIS — E78.5 HYPERLIPIDEMIA LDL GOAL <100: ICD-10-CM

## 2018-02-20 DIAGNOSIS — M75.121 COMPLETE TEAR OF RIGHT ROTATOR CUFF: ICD-10-CM

## 2018-02-20 PROCEDURE — G0439 PPPS, SUBSEQ VISIT: HCPCS | Performed by: NURSE PRACTITIONER

## 2018-02-20 RX ORDER — ATORVASTATIN CALCIUM 10 MG/1
10 TABLET, FILM COATED ORAL
Qty: 45 TAB | Refills: 3 | Status: SHIPPED | OUTPATIENT
Start: 2018-02-20 | End: 2019-05-21

## 2018-02-20 ASSESSMENT — ACTIVITIES OF DAILY LIVING (ADL): BATHING_REQUIRES_ASSISTANCE: 0

## 2018-02-20 ASSESSMENT — PATIENT HEALTH QUESTIONNAIRE - PHQ9: CLINICAL INTERPRETATION OF PHQ2 SCORE: 0

## 2018-02-20 NOTE — ASSESSMENT & PLAN NOTE
Stable on liipitor.  Needs med refilled.  Taking meds approp.  Will be due repeat CMP, LP in march.  Do lab and f/u with pt wiht results.  Then f/u yearly or ssoner if lab abn

## 2018-02-20 NOTE — PROGRESS NOTES
Chief Complaint   Patient presents with   • Annual Wellness Visit         HPI:  Hallie is a 73 y.o. here for Medicare Annual Wellness Visit.  She is feeling well.  She just back from 1 week skiing in [place].      Torn rotator cuff  Has right repair surgery on 3/1/16 by Omar.  No ccurrent issues.      MONIK (obstructive sleep apnea)  Stable on CPAP.  Followed by pulm.  Just saw them in jan and got new machine.    Hyperlipidemia LDL goal <100  Stable on liipitor.  Needs med refilled.  Taking meds approp.  Will be due repeat CMP, LP in march.  Do lab and f/u with pt wiht results.  Then f/u yearly or ssoner if lab abn    Closed fracture of left radius with routine healing  Sx resolved.  Had surgery for repair    BPPV (benign paroxysmal positional vertigo)  She only has rare sx. If she feels sx coming on she will take mecliziine and do epley maneuver and sx will resolve.  No current sx.         Patient Active Problem List    Diagnosis Date Noted   • Hyperlipidemia LDL goal <100 03/08/2017   • Torn rotator cuff    • BPPV (benign paroxysmal positional vertigo)    • Closed fracture of left radius with routine healing 09/04/2013   • MONIK (obstructive sleep apnea) 08/19/2013       Current Outpatient Prescriptions   Medication Sig Dispense Refill   • atorvastatin (LIPITOR) 10 MG Tab Take 1 Tab by mouth every 48 hours. 45 Tab 3   • ascorbic acid (VITAMIN C) 500 MG tablet Take 1 Tab by mouth every day. (Patient taking differently: Take 500 mg by mouth every 48 hours.) 30 Each 3   • Multiple Vitamins-Minerals (CENTRUM SILVER) TABS Take  by mouth every day.     • Magnesium 400 MG CAPS Take  by mouth every 48 hours as needed.     • Calcium Carbonate-Vitamin D (CALCIUM 600 + D PO) Take  by mouth every day.     • Cholecalciferol (VITAMIN D) 2000 UNITS CAPS Take  by mouth every day.       No current facility-administered medications for this visit.         Patient is taking medications as noted in medication list.  Current supplements  as per medication list.     Allergies: Penicillins    Current social contact/activities: Pt travels and works part time.      Is patient current with immunizations? Yes.    She  reports that she has never smoked. She has never used smokeless tobacco. She reports that she drinks alcohol. She reports that she does not use drugs.  Counseling given: Yes        DPA/Advanced directive: Patient has Advanced Directive on file.     ROS:    Gait: Uses no assistive device   Ostomy: no   Other tubes: no   Amputations: no   Chronic oxygen use yes   Last eye exam one year ago    Wears hearing aids: no   : Denies urinary leakage.  Review of Systems   Constitutional: Negative.  Negative for fever, chills, weight loss, malaise/fatigue and diaphoresis.   HENT: Negative.  Negative for hearing loss, ear pain, nosebleeds, congestion, sore throat, neck pain, tinnitus and ear discharge.    Eyes: Negative.  Negative for blurred vision, double vision, photophobia, pain, discharge and redness.   Respiratory: Negative.  Negative for cough, hemoptysis, sputum production, shortness of breath, wheezing and stridor.    Cardiovascular: Negative.  Negative for chest pain, palpitations, orthopnea, claudication, leg swelling and PND.   Gastrointestinal: Negative.  Negative for heartburn, nausea, vomiting, abdominal pain, diarrhea, constipation, blood in stool and melena.   Genitourinary: Negative.  Negative for dysuria, urgency, frequency, incontinence, hematuria and flank pain.   Musculoskeletal: Negative.  Negative for myalgias, back pain, joint pain and falls.   Skin: Negative.  Negative for itching and rash.   Neurological: Negative.  Negative for dizziness, tingling, tremors, sensory change, speech change, focal weakness, seizures, loss of consciousness, weakness and headaches.   Endo/Heme/Allergies: Negative.  Negative for environmental allergies and polydipsia. Does not bruise/bleed easily.   Psychiatric/Behavioral: Negative.  Negative for  depression, suicidal ideas, hallucinations, memory loss and substance abuse. The patient is not nervous/anxious and does not have insomnia.    All other systems reviewed and are negative.            Depression Screening    Little interest or pleasure in doing things?  0 - not at all  Feeling down, depressed, or hopeless? 0 - not at all  Patient Health Questionnaire Score: 0    If depressive symptoms identified deferred to follow up visit unless specifically addressed in assessment and plan.    Interpretation of PHQ-9 Total Score   Score Severity   1-4 No Depression   5-9 Mild Depression   10-14 Moderate Depression   15-19 Moderately Severe Depression   20-27 Severe Depression    Screening for Cognitive Impairment    Three Minute Recall (apple, watch, vielka)  3/3 Vielka, horse, apple   Draw clock face with all 12 numbers set to the hand to show 10 minutes past 11 o'clock  1 5/5  If cognitive concerns identified, deferred for follow up unless specifically addressed in assessment and plan.    Fall Risk Assessment    Has the patient had two or more falls in the last year or any fall with injury in the last year?  No  If fall risk identified, deferred for follow up unless specifically addressed in assessment and plan.    Safety Assessment    Throw rugs on floor.  Yes  Handrails on all stairs.  Yes  Good lighting in all hallways.  Yes  Difficulty hearing.  No  Patient counseled about all safety risks that were identified.    Functional Assessment ADLs    Are there any barriers preventing you from cooking for yourself or meeting nutritional needs?  No.    Are there any barriers preventing you from driving safely or obtaining transportation?  No.    Are there any barriers preventing you from using a telephone or calling for help?  No.    Are there any barriers preventing you from shopping?  No.    Are there any barriers preventing you from taking care of your own finances?  No.    Are there any barriers preventing you from  managing your medications?  No.    Are there any barriers preventing you from showering/bathing yourself?  No.    Are you currently engaging any exercise or physical activity?  Yes.  Pt plays tennis 2x a week and skis.     Health Maintenance Summary                Annual Wellness Visit Overdue 5/30/2015      Done 5/29/2014     COLONOSCOPY Overdue 7/11/2017      Done 7/11/2012 REFERRAL TO GI FOR COLONOSCOPY     Patient has more history with this topic...    IMM INFLUENZA Overdue 9/1/2017      Done 10/12/2016 Imm Admin: Influenza Vaccine Quad Inj (Pf)     Patient has more history with this topic...    MAMMOGRAM Next Due 3/23/2018      Done 3/23/2017 MA-MAMMO SCREENING BILAT W/TOMOSYNTHESIS W/CAD     Patient has more history with this topic...    BONE DENSITY Next Due 11/6/2020      Done 11/6/2015 DS-BONE DENSITY STUDY (DEXA)     Patient has more history with this topic...    IMM DTaP/Tdap/Td Vaccine Next Due 8/19/2023      Done 8/19/2013 Imm Admin: Tdap Vaccine          Patient Care Team:  BLAKE Cadena as PCP - General (Family Medicine)  [facility] as Home Health Provider (DME Supplier)    Social History   Substance Use Topics   • Smoking status: Never Smoker   • Smokeless tobacco: Never Used   • Alcohol use Yes      Comment: one per week or once a month      Family History   Problem Relation Age of Onset   • Lung Disease Mother      emphysema   • Alcohol/Drug Mother    • Alcohol/Drug Father    • Stroke Brother      She  has a past medical history of Anesthesia; BPPV (benign paroxysmal positional vertigo); Closed fracture of radius; Hyperlipidemia; Sleep apnea; and Torn rotator cuff.   Past Surgical History:   Procedure Laterality Date   • SHOULDER DECOMPRESSION ARTHROSCOPIC Right 3/1/2016    Procedure: SHOULDER DECOMPRESSION ARTHROSCOPIC - SUBACROMIAL, LABRAL DEBRIDMENT;  Surgeon: Shama Nelson M.D.;  Location: SURGERY [facility];  Service:    • SHOULDER ARTHROSCOPY W/ ROTATOR CUFF REPAIR  "Right 3/1/2016    Procedure: SHOULDER ARTHROSCOPY W/ ROTATOR CUFF REPAIR;  Surgeon: Shama Nelson M.D.;  Location: SURGERY [facility];  Service:    • WRIST ORIF  9/4/2013    Performed by Prasanna Anthony M.D. at [facility]   • CARPAL TUNNEL RELEASE  9/4/2013    Performed by Prasanna Anthony M.D. at [facility]   • KNEE ARTHROSCOPY Left 2009     - Dr. Newman; left   • LUMPECTOMY Right 1970's    right breast cyst removal   • TONSILLECTOMY  as child           Exam:     Blood pressure 118/62, pulse 72, temperature 36.6 °C (97.8 °F), height 1.562 m (5' 1.5\"), weight 56 kg (123 lb 6.4 oz), SpO2 94 %. Body mass index is 22.94 kg/m².    Hearing excellent.    Dentition good  Alert, oriented in no acute distress.  Eye contact is good, speech goal directed, affect calm  Physical Exam   Vitals reviewed.  Constitutional: oriented to person, place, and time. appears well-developed and well-nourished. No distress.   HENT: Head: Normocephalic and atraumatic. Bilateral tympanic membranes wnl w/o bulging.  Right Ear: External ear normal. Left Ear: External ear normal. Nose: Nose normal.  Mouth/Throat: Oropharynx is clear and moist. No oropharyngeal exudate. nedra tm wnl. Eyes: Conjunctivae and EOM are normal. Pupils are equal, round, and reactive to light. Right eye exhibits no discharge. Left eye exhibits no discharge. No scleral icterus.    Neck: Normal range of motion. Neck supple. No JVD present.   Cardiovascular: Normal rate, regular rhythm, normal heart sounds and intact distal pulses.  Exam reveals no gallop and no friction rub.  No murmur heard.  No carotid bruits   Pulmonary/Chest: Effort normal and breath sounds normal. No stridor. No respiratory distress. no wheezes or rales. exhibits no tenderness.   Abdominal: Soft. Bowel sounds are normal. exhibits no distension and no mass. No tenderness. no rebound and no guarding.   Musculoskeletal: Normal range of motion. exhibits no edema or " tenderness.  nedra pedal pulses 2+.  Lymphadenopathy:  no cervical or supraclavicular adenopathy.   Neurological: alert and oriented to person, place, and time. has normal reflexes. displays normal reflexes. No cranial nerve deficit. exhibits normal muscle tone. Coordination normal.   Skin: Skin is warm and dry. No rash noted. no diaphoresis. No erythema. No pallor.   Psychiatric: normal mood and affect. behavior is normal.         Assessment and Plan. The following treatment and monitoring plan is recommended:    1. BPPV (benign paroxysmal positional vertigo), unspecified laterality      no current sx   2. Complete tear of right rotator cuff      resolved with surgery   3. MONIK (obstructive sleep apnea)      stable on CPAP.  followed by pulm   4. Hyperlipidemia LDL goal <100  atorvastatin (LIPITOR) 10 MG Tab    COMP METABOLIC PANEL    LIPID PROFILE    do lab and f/u with pt with results.  then fu yearly. call for lab slip.  refill meds.  stable on meds   5. Closed fracture of proximal end of left radius with routine healing, unspecified fracture morphology, subsequent encounter      resolved   6. Screen for colon cancer  REFERRAL TO GI FOR COLONOSCOPY         Services suggested: no servicies needed  Health Care Screening recommendations as per orders if indicated.  Referrals offered: PT/OT/Nutrition counseling/Behavioral Health/Smoking cessation as per orders if indicated.    Discussion today about general wellness and lifestyle habits:    · Prevent falls and reduce trip hazards; Cautioned about securing or removing rugs.  · Have a working fire alarm and carbon monoxide detector;   · Engage in regular physical activity and social activities       Follow-up: 1 yr unless lab abn.

## 2018-02-20 NOTE — ASSESSMENT & PLAN NOTE
She only has rare sx. If she feels sx coming on she will take mecliziine and do epley maneuver and sx will resolve.  No current sx.

## 2018-02-24 NOTE — LETTER
February 26, 2018        Hallie Fisher  [address] [place] [address]        Dear Hallie:    We have received a request from your pharmacy to refill your prescription(s). After careful review of your chart, we have noted you are due for labs and a follow up appointment.  We request you call our office at [phone] at your earliest convenience and make an appointment. I have included a fasting lab order for you.    However, when patients are not followed closely by their physician, potential medication complications may go unadressed. We look forward to scheduling an appointment for you, so that we may provide you with the safest and most complete medical care.        If you have any questions or concerns, please don't hesitate to call.        Sincerely,        KVNG Cadena.    Electronically Signed

## 2018-02-25 RX ORDER — ATORVASTATIN CALCIUM 10 MG/1
TABLET, FILM COATED ORAL
Qty: 30 TAB | Refills: 0 | Status: SHIPPED | OUTPATIENT
Start: 2018-02-25 | End: 2018-11-19

## 2018-02-27 ENCOUNTER — HOSPITAL ENCOUNTER (OUTPATIENT)
Dept: LAB | Facility: MEDICAL CENTER | Age: 73
End: 2018-02-27
Attending: NURSE PRACTITIONER
Payer: MEDICARE

## 2018-02-27 DIAGNOSIS — E78.5 HYPERLIPIDEMIA LDL GOAL <100: ICD-10-CM

## 2018-02-27 LAB
ALBUMIN SERPL BCP-MCNC: 4.2 G/DL (ref 3.2–4.9)
ALBUMIN/GLOB SERPL: 1.9 G/DL
ALP SERPL-CCNC: 77 U/L (ref 30–99)
ALT SERPL-CCNC: 15 U/L (ref 2–50)
ANION GAP SERPL CALC-SCNC: 9 MMOL/L (ref 0–11.9)
AST SERPL-CCNC: 20 U/L (ref 12–45)
BILIRUB SERPL-MCNC: 0.5 MG/DL (ref 0.1–1.5)
BUN SERPL-MCNC: 12 MG/DL (ref 8–22)
CALCIUM SERPL-MCNC: 9.1 MG/DL (ref 8.5–10.5)
CHLORIDE SERPL-SCNC: 103 MMOL/L (ref 96–112)
CHOLEST SERPL-MCNC: 172 MG/DL (ref 100–199)
CO2 SERPL-SCNC: 25 MMOL/L (ref 20–33)
CREAT SERPL-MCNC: 0.67 MG/DL (ref 0.5–1.4)
GLOBULIN SER CALC-MCNC: 2.2 G/DL (ref 1.9–3.5)
GLUCOSE SERPL-MCNC: 87 MG/DL (ref 65–99)
HDLC SERPL-MCNC: 54 MG/DL
LDLC SERPL CALC-MCNC: 84 MG/DL
POTASSIUM SERPL-SCNC: 4 MMOL/L (ref 3.6–5.5)
PROT SERPL-MCNC: 6.4 G/DL (ref 6–8.2)
SODIUM SERPL-SCNC: 137 MMOL/L (ref 135–145)
TRIGL SERPL-MCNC: 168 MG/DL (ref 0–149)

## 2018-02-27 PROCEDURE — 80061 LIPID PANEL: CPT

## 2018-02-27 PROCEDURE — 80053 COMPREHEN METABOLIC PANEL: CPT

## 2018-02-27 PROCEDURE — 36415 COLL VENOUS BLD VENIPUNCTURE: CPT

## 2018-02-28 ENCOUNTER — HOSPITAL ENCOUNTER (OUTPATIENT)
Facility: MEDICAL CENTER | Age: 73
End: 2018-02-28
Attending: NURSE PRACTITIONER
Payer: MEDICARE

## 2018-02-28 ENCOUNTER — TELEPHONE (OUTPATIENT)
Dept: MEDICAL GROUP | Facility: MEDICAL CENTER | Age: 73
End: 2018-02-28

## 2018-02-28 PROCEDURE — 82274 ASSAY TEST FOR BLOOD FECAL: CPT

## 2018-02-28 NOTE — TELEPHONE ENCOUNTER
Please let pt know that the CMP, GFR, LP is wnl except her trg are up from last year.  Need to dec complex carbs in diet. .

## 2018-03-06 ENCOUNTER — TELEPHONE (OUTPATIENT)
Dept: MEDICAL GROUP | Facility: MEDICAL CENTER | Age: 73
End: 2018-03-06

## 2018-03-06 DIAGNOSIS — Z12.11 SCREEN FOR COLON CANCER: ICD-10-CM

## 2018-03-06 LAB — HEMOCCULT STL QL IA: NEGATIVE

## 2018-04-18 RX ORDER — ATORVASTATIN CALCIUM 10 MG/1
TABLET, FILM COATED ORAL
Qty: 90 TAB | Refills: 3 | Status: SHIPPED | OUTPATIENT
Start: 2018-04-18 | End: 2018-11-19

## 2018-07-11 DIAGNOSIS — R92.2 DENSE BREASTS: ICD-10-CM

## 2018-07-11 DIAGNOSIS — R92.30 DENSE BREASTS: ICD-10-CM

## 2018-09-21 ENCOUNTER — NON-PROVIDER VISIT (OUTPATIENT)
Dept: MEDICAL GROUP | Facility: MEDICAL CENTER | Age: 73
End: 2018-09-21
Payer: MEDICARE

## 2018-09-21 DIAGNOSIS — Z23 NEED FOR VACCINATION: ICD-10-CM

## 2018-09-21 PROCEDURE — 90662 IIV NO PRSV INCREASED AG IM: CPT | Performed by: FAMILY MEDICINE

## 2018-09-21 PROCEDURE — G0008 ADMIN INFLUENZA VIRUS VAC: HCPCS | Performed by: FAMILY MEDICINE

## 2018-09-21 NOTE — PROGRESS NOTES
"Hallie Fisher is a 73 y.o. female here for a non-provider visit for:   FLU    Reason for immunization: Annual Flu Vaccine  Immunization records indicate need for vaccine: Yes, confirmed with [place] [employer]  Minimum interval has been met for this vaccine: Yes  ABN completed: Not Indicated    Order and dose verified by: PRANAY  VIS Dated  08/07/2015 was given to patient: Yes  All IAC Questionnaire questions were answered \"No.\"    Patient tolerated injection and no adverse effects were observed or reported: Yes    Pt scheduled for next dose in series: Not Indicated    "

## 2018-11-19 ENCOUNTER — SLEEP CENTER VISIT (OUTPATIENT)
Dept: SLEEP MEDICINE | Facility: MEDICAL CENTER | Age: 73
End: 2018-11-19
Payer: MEDICARE

## 2018-11-19 VITALS
WEIGHT: 121 LBS | HEART RATE: 54 BPM | DIASTOLIC BLOOD PRESSURE: 66 MMHG | OXYGEN SATURATION: 97 % | HEIGHT: 62 IN | BODY MASS INDEX: 22.26 KG/M2 | SYSTOLIC BLOOD PRESSURE: 110 MMHG | RESPIRATION RATE: 16 BRPM

## 2018-11-19 DIAGNOSIS — Z78.9 NONSMOKER: ICD-10-CM

## 2018-11-19 DIAGNOSIS — E78.5 HYPERLIPIDEMIA LDL GOAL <100: ICD-10-CM

## 2018-11-19 DIAGNOSIS — G47.33 OSA (OBSTRUCTIVE SLEEP APNEA): Chronic | ICD-10-CM

## 2018-11-19 PROCEDURE — 99213 OFFICE O/P EST LOW 20 MIN: CPT | Performed by: NURSE PRACTITIONER

## 2019-02-20 ENCOUNTER — APPOINTMENT (RX ONLY)
Dept: URBAN - METROPOLITAN AREA CLINIC 22 | Facility: CLINIC | Age: 74
Setting detail: DERMATOLOGY
End: 2019-02-20

## 2019-02-20 DIAGNOSIS — D22 MELANOCYTIC NEVI: ICD-10-CM

## 2019-02-20 DIAGNOSIS — Z71.89 OTHER SPECIFIED COUNSELING: ICD-10-CM

## 2019-02-20 DIAGNOSIS — L57.0 ACTINIC KERATOSIS: ICD-10-CM

## 2019-02-20 DIAGNOSIS — L82.0 INFLAMED SEBORRHEIC KERATOSIS: ICD-10-CM

## 2019-02-20 DIAGNOSIS — L81.4 OTHER MELANIN HYPERPIGMENTATION: ICD-10-CM

## 2019-02-20 DIAGNOSIS — L82.1 OTHER SEBORRHEIC KERATOSIS: ICD-10-CM

## 2019-02-20 DIAGNOSIS — D18.0 HEMANGIOMA: ICD-10-CM

## 2019-02-20 PROBLEM — D18.01 HEMANGIOMA OF SKIN AND SUBCUTANEOUS TISSUE: Status: ACTIVE | Noted: 2019-02-20

## 2019-02-20 PROBLEM — D22.5 MELANOCYTIC NEVI OF TRUNK: Status: ACTIVE | Noted: 2019-02-20

## 2019-02-20 PROCEDURE — ? COUNSELING

## 2019-02-20 PROCEDURE — 17004 DESTROY PREMAL LESIONS 15/>: CPT

## 2019-02-20 PROCEDURE — ? LIQUID NITROGEN

## 2019-02-20 PROCEDURE — 99203 OFFICE O/P NEW LOW 30 MIN: CPT | Mod: 25

## 2019-02-20 ASSESSMENT — LOCATION SIMPLE DESCRIPTION DERM
LOCATION SIMPLE: LEFT FOREHEAD
LOCATION SIMPLE: RIGHT UPPER BACK
LOCATION SIMPLE: LEFT UPPER BACK
LOCATION SIMPLE: RIGHT ANTERIOR NECK
LOCATION SIMPLE: RIGHT LOWER BACK
LOCATION SIMPLE: LEFT FOREARM
LOCATION SIMPLE: LEFT ZYGOMA
LOCATION SIMPLE: CHEST
LOCATION SIMPLE: ABDOMEN
LOCATION SIMPLE: RIGHT TEMPLE
LOCATION SIMPLE: RIGHT FOREHEAD

## 2019-02-20 ASSESSMENT — LOCATION DETAILED DESCRIPTION DERM
LOCATION DETAILED: LEFT INFERIOR MEDIAL FOREHEAD
LOCATION DETAILED: RIGHT MEDIAL UPPER BACK
LOCATION DETAILED: LEFT SUPERIOR UPPER BACK
LOCATION DETAILED: RIGHT CENTRAL TEMPLE
LOCATION DETAILED: LEFT DISTAL DORSAL FOREARM
LOCATION DETAILED: LEFT MEDIAL SUPERIOR CHEST
LOCATION DETAILED: LEFT SUPERIOR MEDIAL FOREHEAD
LOCATION DETAILED: RIGHT MEDIAL SUPERIOR CHEST
LOCATION DETAILED: UPPER STERNUM
LOCATION DETAILED: RIGHT INFERIOR ANTERIOR NECK
LOCATION DETAILED: LEFT SUPERIOR MEDIAL UPPER BACK
LOCATION DETAILED: LEFT PROXIMAL DORSAL FOREARM
LOCATION DETAILED: LEFT CENTRAL ZYGOMA
LOCATION DETAILED: RIGHT CLAVICULAR NECK
LOCATION DETAILED: LEFT LATERAL SUPERIOR CHEST
LOCATION DETAILED: RIGHT SUPERIOR MEDIAL MIDBACK
LOCATION DETAILED: EPIGASTRIC SKIN
LOCATION DETAILED: LEFT FOREHEAD
LOCATION DETAILED: MIDDLE STERNUM
LOCATION DETAILED: RIGHT LATERAL SUPERIOR CHEST
LOCATION DETAILED: RIGHT SUPERIOR FOREHEAD

## 2019-02-20 ASSESSMENT — LOCATION ZONE DERM
LOCATION ZONE: NECK
LOCATION ZONE: TRUNK
LOCATION ZONE: FACE
LOCATION ZONE: ARM

## 2019-02-20 NOTE — PROCEDURE: LIQUID NITROGEN
Medical Necessity Information: It is in your best interest to select a reason for this procedure from the list below. All of these items fulfill various CMS LCD requirements except the new and changing color options.
Detail Level: Detailed
Consent: The patient's consent was obtained including but not limited to risks of crusting, scabbing, blistering, scarring, darker or lighter pigmentary change, recurrence, incomplete removal and infection.
Render Post-Care Instructions In Note?: no
Number Of Freeze-Thaw Cycles: 3 freeze-thaw cycles
Post-Care Instructions: I reviewed with the patient in detail post-care instructions. Patient is to wear sunprotection, and avoid picking at any of the treated lesions. Pt may apply Vaseline to crusted or scabbing areas.
Medical Necessity Clause: This procedure was medically necessary because the lesions that were treated were:
Number Of Freeze-Thaw Cycles: 2 freeze-thaw cycles
Duration Of Freeze Thaw-Cycle (Seconds): 3

## 2019-04-10 DIAGNOSIS — E78.5 HYPERLIPIDEMIA LDL GOAL <100: ICD-10-CM

## 2019-04-11 RX ORDER — ATORVASTATIN CALCIUM 10 MG/1
TABLET, FILM COATED ORAL
Qty: 30 TAB | Refills: 0 | Status: SHIPPED | OUTPATIENT
Start: 2019-04-11 | End: 2019-05-07 | Stop reason: SDUPTHER

## 2019-05-07 DIAGNOSIS — E78.5 HYPERLIPIDEMIA LDL GOAL <100: ICD-10-CM

## 2019-05-07 RX ORDER — ATORVASTATIN CALCIUM 10 MG/1
TABLET, FILM COATED ORAL
Qty: 15 TAB | Refills: 0 | Status: SHIPPED | OUTPATIENT
Start: 2019-05-07 | End: 2019-05-21 | Stop reason: SDUPTHER

## 2019-05-17 ENCOUNTER — HOSPITAL ENCOUNTER (OUTPATIENT)
Dept: LAB | Facility: MEDICAL CENTER | Age: 74
End: 2019-05-17
Attending: NURSE PRACTITIONER
Payer: MEDICARE

## 2019-05-17 DIAGNOSIS — E78.5 HYPERLIPIDEMIA LDL GOAL <100: ICD-10-CM

## 2019-05-17 LAB
ALBUMIN SERPL BCP-MCNC: 4.4 G/DL (ref 3.2–4.9)
ALBUMIN/GLOB SERPL: 2.1 G/DL
ALP SERPL-CCNC: 86 U/L (ref 30–99)
ALT SERPL-CCNC: 17 U/L (ref 2–50)
ANION GAP SERPL CALC-SCNC: 4 MMOL/L (ref 0–11.9)
AST SERPL-CCNC: 20 U/L (ref 12–45)
BILIRUB SERPL-MCNC: 0.4 MG/DL (ref 0.1–1.5)
BUN SERPL-MCNC: 11 MG/DL (ref 8–22)
CALCIUM SERPL-MCNC: 9 MG/DL (ref 8.5–10.5)
CHLORIDE SERPL-SCNC: 107 MMOL/L (ref 96–112)
CHOLEST SERPL-MCNC: 156 MG/DL (ref 100–199)
CO2 SERPL-SCNC: 29 MMOL/L (ref 20–33)
CREAT SERPL-MCNC: 0.7 MG/DL (ref 0.5–1.4)
FASTING STATUS PATIENT QL REPORTED: NORMAL
GLOBULIN SER CALC-MCNC: 2.1 G/DL (ref 1.9–3.5)
GLUCOSE SERPL-MCNC: 90 MG/DL (ref 65–99)
HDLC SERPL-MCNC: 64 MG/DL
LDLC SERPL CALC-MCNC: 75 MG/DL
POTASSIUM SERPL-SCNC: 4.5 MMOL/L (ref 3.6–5.5)
PROT SERPL-MCNC: 6.5 G/DL (ref 6–8.2)
SODIUM SERPL-SCNC: 140 MMOL/L (ref 135–145)
TRIGL SERPL-MCNC: 87 MG/DL (ref 0–149)

## 2019-05-17 PROCEDURE — 80053 COMPREHEN METABOLIC PANEL: CPT

## 2019-05-17 PROCEDURE — 80061 LIPID PANEL: CPT

## 2019-05-17 PROCEDURE — 36415 COLL VENOUS BLD VENIPUNCTURE: CPT

## 2019-05-21 ENCOUNTER — OFFICE VISIT (OUTPATIENT)
Dept: MEDICAL GROUP | Facility: MEDICAL CENTER | Age: 74
End: 2019-05-21
Payer: MEDICARE

## 2019-05-21 VITALS
HEART RATE: 83 BPM | HEIGHT: 62 IN | WEIGHT: 123 LBS | DIASTOLIC BLOOD PRESSURE: 60 MMHG | TEMPERATURE: 97.6 F | BODY MASS INDEX: 22.63 KG/M2 | SYSTOLIC BLOOD PRESSURE: 120 MMHG | OXYGEN SATURATION: 95 %

## 2019-05-21 DIAGNOSIS — G47.33 OSA (OBSTRUCTIVE SLEEP APNEA): Chronic | ICD-10-CM

## 2019-05-21 DIAGNOSIS — S52.102D CLOSED FRACTURE OF PROXIMAL END OF LEFT RADIUS WITH ROUTINE HEALING, UNSPECIFIED FRACTURE MORPHOLOGY, SUBSEQUENT ENCOUNTER: ICD-10-CM

## 2019-05-21 DIAGNOSIS — Z12.11 SCREEN FOR COLON CANCER: ICD-10-CM

## 2019-05-21 DIAGNOSIS — Z12.31 ENCOUNTER FOR SCREENING MAMMOGRAM FOR MALIGNANT NEOPLASM OF BREAST: ICD-10-CM

## 2019-05-21 DIAGNOSIS — E78.5 HYPERLIPIDEMIA LDL GOAL <100: ICD-10-CM

## 2019-05-21 DIAGNOSIS — M75.121 COMPLETE TEAR OF RIGHT ROTATOR CUFF: ICD-10-CM

## 2019-05-21 DIAGNOSIS — H81.10 BENIGN PAROXYSMAL POSITIONAL VERTIGO, UNSPECIFIED LATERALITY: ICD-10-CM

## 2019-05-21 PROCEDURE — G0439 PPPS, SUBSEQ VISIT: HCPCS | Performed by: NURSE PRACTITIONER

## 2019-05-21 RX ORDER — ATORVASTATIN CALCIUM 10 MG/1
10 TABLET, FILM COATED ORAL
Qty: 36 TAB | Refills: 3 | Status: SHIPPED | OUTPATIENT
Start: 2019-05-21 | End: 2020-01-13 | Stop reason: SDUPTHER

## 2019-05-21 ASSESSMENT — ENCOUNTER SYMPTOMS: GENERAL WELL-BEING: EXCELLENT

## 2019-05-21 ASSESSMENT — PATIENT HEALTH QUESTIONNAIRE - PHQ9: CLINICAL INTERPRETATION OF PHQ2 SCORE: 0

## 2019-05-21 ASSESSMENT — ACTIVITIES OF DAILY LIVING (ADL): BATHING_REQUIRES_ASSISTANCE: 0

## 2019-05-21 NOTE — PROGRESS NOTES
Subjective:     Hallie Fisher is a 74 y.o. female here today for nd Annual Health Assessment.     Seen in f/u for AHA.  She has had a rough several weeks with her doggy ill.   Reviewed lab with pt.  Her CMP, GFR, LP is wnl.     Torn rotator cuff  All sx resolved with surgery    MONIK (obstructive sleep apnea)  Stable on CPAP    Hyperlipidemia LDL goal <100  Stable on med.  Refilled lipitor.  Current LP is wnl    Closed fracture of left radius with routine healing  Resolved and healed    BPPV (benign paroxysmal positional vertigo)  Sx resolved currently.  Uses meclizine prn        Health Maintenance Summary                IMM ZOSTER VACCINES Overdue 7/18/2014      Done 5/23/2014 Imm Admin: Zoster Vaccine Live (ZVL) (Zostavax)    COLONOSCOPY Overdue 7/11/2017      Done 7/11/2012 REFERRAL TO GI FOR COLONOSCOPY     Patient has more history with this topic...    MAMMOGRAM Overdue 3/23/2018      Done 3/23/2017 MA-MAMMO SCREENING BILAT W/TOMOSYNTHESIS W/CAD     Patient has more history with this topic...    Annual Wellness Visit Overdue 2/21/2019      Done 2/20/2018      Patient has more history with this topic...    BONE DENSITY Next Due 11/6/2020      Done 11/6/2015 DS-BONE DENSITY STUDY (DEXA)     Patient has more history with this topic...    IMM DTaP/Tdap/Td Vaccine Next Due 8/19/2023      Done 8/19/2013 Imm Admin: Tdap Vaccine         No chief complaint on file.        HPI:  Hallie Fisher is a 74 y.o. here for Medicare Annual Wellness Visit     Patient Active Problem List    Diagnosis Date Noted   • Anesthesia    • Hyperlipidemia LDL goal <100 03/08/2017   • Torn rotator cuff    • BPPV (benign paroxysmal positional vertigo)    • Closed fracture of left radius with routine healing 09/04/2013   • MONIK (obstructive sleep apnea) 08/19/2013       Current Outpatient Prescriptions   Medication Sig Dispense Refill   • atorvastatin (LIPITOR) 10 MG Tab TAKE 1 TABLET BY MOUTH EVERY DAY 15 Tab 0   • ascorbic acid  (VITAMIN C) 500 MG tablet Take 1 Tab by mouth every day. (Patient taking differently: Take 500 mg by mouth every 48 hours.) 30 Each 3   • Multiple Vitamins-Minerals (CENTRUM SILVER) TABS Take  by mouth every day.     • Magnesium 400 MG CAPS Take  by mouth every 48 hours as needed.     • Calcium Carbonate-Vitamin D (CALCIUM 600 + D PO) Take  by mouth every day.     • Cholecalciferol (VITAMIN D) 2000 UNITS CAPS Take  by mouth every day.       No current facility-administered medications for this visit.             Current supplements as per medication list.       Allergies: Penicillins    Current social contact/activities:   1.  Tennis  2.  Play at [employer]  3.  Walk dog  4.  Crafts  5.  Out to lunch with      She  reports that she has never smoked. She has never used smokeless tobacco. She reports that she drinks alcohol. She reports that she does not use drugs.  Counseling given: Not Answered      DPA/Advanced Directive:  Patient has Advanced Directive, Living Will and Durable Power of , but it is not on file. Instructed to bring in a copy to scan into their chart.    ROS:    Gait: Uses no assistive device  Ostomy: No  Other tubes: No  Amputations: No  Chronic oxygen use: No  Uses CPAP  Last eye exam: awhile ago  Wears hearing aids: No   : Denies any urinary leakage during the last 6 months    Screening:  cologuard and mammo  Depression Screening    Little interest or pleasure in doing things?  0 - not at all  Feeling down, depressed , or hopeless? 0 - not at all  Patient Health Questionnaire Score: 0     If depressive symptoms identified deferred to follow up visit unless specifically addressed in assessment and plan.    Interpretation of PHQ-9 Total Score   Score Severity   1-4 No Depression   5-9 Mild Depression   10-14 Moderate Depression   15-19 Moderately Severe Depression   20-27 Severe Depression    Screening for Cognitive Impairment    Three Minute Recall (village, kitchen, baby) 2/3    Tristin  clock face with all 12 numbers and set the hands to show 10 past 10.  Yes    Cognitive concerns identified deferred for follow up unless specifically addressed in assessment and plan.    Fall Risk Assessment    Has the patient had two or more falls in the last year or any fall with injury in the last year?  No    Safety Assessment    Throw rugs on floor.  Yes  Handrails on all stairs.  Yes  Good lighting in all hallways.  Yes  Difficulty hearing.  No  Patient counseled about all safety risks that were identified.    Functional Assessment ADLs    Are there any barriers preventing you from cooking for yourself or meeting nutritional needs?  No.    Are there any barriers preventing you from driving safely or obtaining transportation?  No.    Are there any barriers preventing you from using a telephone or calling for help?  No.    Are there any barriers preventing you from shopping?  No.    Are there any barriers preventing you from taking care of your own finances?  No.    Are there any barriers preventing you from managing your medications?  No.    Are there any barriers preventing you from showering, bathing or dressing yourself?  No.    Are you currently engaging in any exercise or physical activity?  Yes.     What is your perception of your health?  Excellent.      Health Maintenance Summary                IMM ZOSTER VACCINES Overdue 7/18/2014      Done 5/23/2014 Imm Admin: Zoster Vaccine Live (ZVL) (Zostavax)    COLONOSCOPY Overdue 7/11/2017      Done 7/11/2012 REFERRAL TO GI FOR COLONOSCOPY     Patient has more history with this topic...    MAMMOGRAM Overdue 3/23/2018      Done 3/23/2017 MA-MAMMO SCREENING BILAT W/TOMOSYNTHESIS W/CAD     Patient has more history with this topic...    Annual Wellness Visit Overdue 2/21/2019      Done 2/20/2018      Patient has more history with this topic...    BONE DENSITY Next Due 11/6/2020      Done 11/6/2015 DS-BONE DENSITY STUDY (DEXA)     Patient has more history with this  "topic...    IMM DTaP/Tdap/Td Vaccine Next Due 8/19/2023      Done 8/19/2013 Imm Admin: Tdap Vaccine          Patient Care Team:  BLAKE Cadena as PCP - General (Family Medicine)  [facility] as Home Health Provider (DME Supplier)        Social History   Substance Use Topics   • Smoking status: Never Smoker   • Smokeless tobacco: Never Used   • Alcohol use Yes      Comment: one per week or once a month      Family History   Problem Relation Age of Onset   • Lung Disease Mother         emphysema   • Alcohol/Drug Mother    • Alcohol/Drug Father    • Stroke Brother      She  has a past medical history of Anesthesia; BPPV (benign paroxysmal positional vertigo); Closed fracture of radius; Hyperlipidemia; Sleep apnea; and Torn rotator cuff.   Past Surgical History:   Procedure Laterality Date   • SHOULDER DECOMPRESSION ARTHROSCOPIC Right 3/1/2016    Procedure: SHOULDER DECOMPRESSION ARTHROSCOPIC - SUBACROMIAL, LABRAL DEBRIDMENT;  Surgeon: Shama Nelson M.D.;  Location: [facility];  Service:    • SHOULDER ARTHROSCOPY W/ ROTATOR CUFF REPAIR Right 3/1/2016    Procedure: SHOULDER ARTHROSCOPY W/ ROTATOR CUFF REPAIR;  Surgeon: Shama Nelsno M.D.;  Location: [facility];  Service:    • WRIST ORIF  9/4/2013    Performed by Prasanna Anthony M.D. at [facility]   • CARPAL TUNNEL RELEASE  9/4/2013    Performed by Prasanna Anthony M.D. at [facility]   • KNEE ARTHROSCOPY Left 2009     - Dr. Newman; left   • LUMPECTOMY Right 1970's    right breast cyst removal   • TONSILLECTOMY  as child       Exam:   /60 (BP Location: Right arm, Patient Position: Sitting)   Pulse 83   Temp 36.4 °C (97.6 °F) (Temporal)   Ht 1.562 m (5' 1.5\")   Wt 55.8 kg (123 lb)   SpO2 95%  Body mass index is 22.86 kg/m².    Hearing excellent.    Dentition good  Alert, oriented in no acute distress.  Eye contact is good, speech goal directed, affect calm      Services suggested: No " services needed at this time  Health Care Screening: Age-appropriate preventive services recommended by USPTF and ACIP covered by Medicare were discussed today. Services ordered if indicated and agreed upon by the patient.  Referrals offered: Community-based lifestyle interventions to reduce health risks and promote self-management and wellness, fall prevention, nutrition, physical activity, tobacco-use cessation, weight loss, and mental health services as per orders if indicated.    Discussion today about general wellness and lifestyle habits:    · Prevent falls and reduce trip hazards; Cautioned about securing or removing rugs.  · Have a working fire alarm and carbon monoxide detector;   · Engage in regular physical activity and social activities       Annual Health Assessment Questions:     1.  Are you currently engaging in any exercise or physical activity? Yes    2.  How would you describe your mood or emotional well-being today? good    3.  Have you had any falls in the last year? No    4.  Have you noticed any problems with your balance or had difficulty walking? No    5.  In the last six months have you experienced any leakage of urine? No    6. DPA/Advanced Directive: Patient has Advanced Directive on file.     Current medicines (including changes today)  Current Outpatient Prescriptions   Medication Sig Dispense Refill   • atorvastatin (LIPITOR) 10 MG Tab TAKE 1 TABLET BY MOUTH EVERY DAY 15 Tab 0   • atorvastatin (LIPITOR) 10 MG Tab Take 1 Tab by mouth every 48 hours. 45 Tab 3   • ascorbic acid (VITAMIN C) 500 MG tablet Take 1 Tab by mouth every day. (Patient taking differently: Take 500 mg by mouth every 48 hours.) 30 Each 3   • Multiple Vitamins-Minerals (CENTRUM SILVER) TABS Take  by mouth every day.     • Magnesium 400 MG CAPS Take  by mouth every 48 hours as needed.     • Calcium Carbonate-Vitamin D (CALCIUM 600 + D PO) Take  by mouth every day.     • Cholecalciferol (VITAMIN D) 2000 UNITS CAPS Take   by mouth every day.       No current facility-administered medications for this visit.        She  has a past medical history of Anesthesia; BPPV (benign paroxysmal positional vertigo); Closed fracture of radius; Hyperlipidemia; Sleep apnea; and Torn rotator cuff.    Penicillins    She  reports that she has never smoked. She has never used smokeless tobacco. She reports that she drinks alcohol. She reports that she does not use drugs.  Counseling given: Not Answered      ROS   No chest pain, no shortness of breath, no abdominal pain.  Review of Systems   Constitutional: Negative.  Negative for fever, chills, weight loss, malaise/fatigue and diaphoresis.   HENT: Negative.  Negative for hearing loss, ear pain, nosebleeds, congestion, sore throat, neck pain, tinnitus and ear discharge.    Eyes: Negative.  Negative for blurred vision, double vision, photophobia, pain, discharge and redness.   Respiratory: Negative.  Negative for cough, hemoptysis, sputum production, shortness of breath, wheezing and stridor.    Cardiovascular: Negative.  Negative for chest pain, palpitations, orthopnea, claudication, leg swelling and PND.   Gastrointestinal: Negative.  Negative for heartburn, nausea, vomiting, abdominal pain, diarrhea, constipation, blood in stool and melena.   Genitourinary: Negative.  Negative for dysuria, urgency, frequency, incontinence, hematuria and flank pain.   Musculoskeletal: Negative.  Negative for myalgias, back pain, joint pain and falls.   Skin: Negative.  Negative for itching and rash. followed by derm  Neurological: Negative.  Negative for dizziness, tingling, tremors, sensory change, speech change, focal weakness, seizures, loss of consciousness, weakness and headaches.   Endo/Heme/Allergies: Negative.  Negative for environmental allergies and polydipsia. Does not bruise/bleed easily.   Psychiatric/Behavioral: Negative.  Negative for depression, suicidal ideas, hallucinations, memory loss and substance  "abuse. The patient is not nervous/anxious and does not have insomnia.    All other systems reviewed and are negative.       Objective:     Physical Exam:  /60 (BP Location: Right arm, Patient Position: Sitting)   Pulse 83   Temp 36.4 °C (97.6 °F) (Temporal)   Ht 1.562 m (5' 1.5\")   Wt 55.8 kg (123 lb)   SpO2 95%  Body mass index is 22.86 kg/m².   Physical Exam   Vitals reviewed.  Constitutional: oriented to person, place, and time. appears well-developed and well-nourished. No distress.   HENT: Head: Normocephalic and atraumatic. Bilateral tympanic membranes wnl w/o bulging.  Right Ear: External ear normal. Left Ear: External ear normal. Nose: Nose normal.  Mouth/Throat: Oropharynx is clear and moist. No oropharyngeal exudate. nedra tm wnl. Eyes: Conjunctivae and EOM are normal. Pupils are equal, round, and reactive to light. Right eye exhibits no discharge. Left eye exhibits no discharge. No scleral icterus.    Neck: Normal range of motion. Neck supple. No JVD present.   Cardiovascular: Normal rate, regular rhythm, normal heart sounds and intact distal pulses.  Exam reveals no gallop and no friction rub.  No murmur heard.  No carotid bruits   Pulmonary/Chest: Effort normal and breath sounds normal. No stridor. No respiratory distress. no wheezes or rales. exhibits no tenderness.   Abdominal: Soft. Bowel sounds are normal. exhibits no distension and no mass. No tenderness. no rebound and no guarding.   Musculoskeletal: Normal range of motion. exhibits no edema or tenderness.  nedra pedal pulses 2+.  Lymphadenopathy:  no cervical or supraclavicular adenopathy.   Neurological: alert and oriented to person, place, and time. has normal reflexes. displays normal reflexes. No cranial nerve deficit. exhibits normal muscle tone. Coordination normal.   Skin: Skin is warm and dry. No rash noted. no diaphoresis. No erythema. No pallor.   Psychiatric: normal mood and affect. behavior is normal.       Discussion today " about general wellness and lifestyle habits:    · Engage in regular physical activity and social activities.  · Prevent falls and reduce trip hazards; using ambulatory aides, hearing and vision testing if appropriate.  · Steps to improve urinary incontinence.  · Advanced care planning.      1. Hyperlipidemia LDL goal <100  atorvastatin (LIPITOR) 10 MG Tab    stable on meds   2. Complete tear of right rotator cuff      resolved   3. MONIK (obstructive sleep apnea)      stable on CPAP   4. Closed fracture of proximal end of left radius with routine healing, unspecified fracture morphology, subsequent encounter      resolved and healed   5. Benign paroxysmal positional vertigo, unspecified laterality      no current sx or tx needed.  uses meclizine prn   6. Screen for colon cancer  COLOGUARD (FIT DNA)   7. Encounter for screening mammogram for malignant neoplasm of breast  MA-SCREEN MAMMO W/CAD-BILAT     8.  F/u yearly.  Call for lab slip

## 2019-07-02 ENCOUNTER — HOSPITAL ENCOUNTER (OUTPATIENT)
Dept: RADIOLOGY | Facility: MEDICAL CENTER | Age: 74
End: 2019-07-02
Attending: NURSE PRACTITIONER
Payer: MEDICARE

## 2019-07-02 DIAGNOSIS — Z12.31 ENCOUNTER FOR SCREENING MAMMOGRAM FOR MALIGNANT NEOPLASM OF BREAST: ICD-10-CM

## 2019-07-02 PROCEDURE — 77063 BREAST TOMOSYNTHESIS BI: CPT

## 2019-07-03 ENCOUNTER — TELEPHONE (OUTPATIENT)
Dept: MEDICAL GROUP | Facility: MEDICAL CENTER | Age: 74
End: 2019-07-03

## 2019-09-17 ENCOUNTER — NON-PROVIDER VISIT (OUTPATIENT)
Dept: MEDICAL GROUP | Facility: MEDICAL CENTER | Age: 74
End: 2019-09-17
Payer: MEDICARE

## 2019-09-17 DIAGNOSIS — Z23 NEED FOR VACCINATION: ICD-10-CM

## 2019-09-17 PROCEDURE — G0008 ADMIN INFLUENZA VIRUS VAC: HCPCS | Performed by: NURSE PRACTITIONER

## 2019-09-17 PROCEDURE — 90662 IIV NO PRSV INCREASED AG IM: CPT | Performed by: NURSE PRACTITIONER

## 2019-09-17 NOTE — PROGRESS NOTES
"Hallie Fisher is a 74 y.o. female here for a non-provider visit for:   FLU    Reason for immunization: Annual Flu Vaccine  Immunization records indicate need for vaccine: Yes, confirmed with Epic  Minimum interval has been met for this vaccine: Yes  ABN completed: No    Order and dose verified by: HONEY  VIS Dated  8/7/15 was given to patient: Yes  All IAC Questionnaire questions were answered \"No.\"    Patient tolerated injection and no adverse effects were observed or reported: Yes    Pt scheduled for next dose in series: Yes    "

## 2019-11-26 ENCOUNTER — OFFICE VISIT (OUTPATIENT)
Dept: MEDICAL GROUP | Facility: MEDICAL CENTER | Age: 74
End: 2019-11-26
Payer: MEDICARE

## 2019-11-26 VITALS
WEIGHT: 121 LBS | HEIGHT: 62 IN | OXYGEN SATURATION: 97 % | HEART RATE: 68 BPM | DIASTOLIC BLOOD PRESSURE: 70 MMHG | SYSTOLIC BLOOD PRESSURE: 124 MMHG | TEMPERATURE: 96.6 F | BODY MASS INDEX: 22.26 KG/M2

## 2019-11-26 DIAGNOSIS — R41.3 MEMORY LOSS OF UNKNOWN CAUSE: ICD-10-CM

## 2019-11-26 DIAGNOSIS — Z86.018 HISTORY OF MENINGIOMA: ICD-10-CM

## 2019-11-26 DIAGNOSIS — R42 DIZZINESS: ICD-10-CM

## 2019-11-26 PROCEDURE — 99214 OFFICE O/P EST MOD 30 MIN: CPT | Performed by: NURSE PRACTITIONER

## 2019-11-26 PROCEDURE — 93000 ELECTROCARDIOGRAM COMPLETE: CPT | Performed by: NURSE PRACTITIONER

## 2019-11-26 NOTE — PROGRESS NOTES
Subjective:     Hallie Fisher is a 74 y.o. female who presents with dizziness.    HPI:   Seen in f/u for dizziness.  Sx started 2 weeks ago.  She woke up with dizziness.  Has hx of vertigo.  She also noted that she was forgetting things.    During this time they also found that their dog was very ill.  She hasn't been sleeping well. She uses CPAP.  She relates her dogs illness with seizures to not sleeping since dog sleeps with her.    She is having to think about things more.  This is new.  She is loosing names of people and objects.  She has no hx of CVA.    The dizziness/strange feeling has resolved.  No palp, chest pain or SOB.  No numbness or tingling.      Patient Active Problem List    Diagnosis Date Noted   • History of meningioma 11/26/2019   • Anesthesia    • Hyperlipidemia LDL goal <100 03/08/2017   • Torn rotator cuff    • BPPV (benign paroxysmal positional vertigo)    • Closed fracture of left radius with routine healing 09/04/2013   • MONIK (obstructive sleep apnea) 08/19/2013       Current medicines (including changes today)  Current Outpatient Medications   Medication Sig Dispense Refill   • atorvastatin (LIPITOR) 10 MG Tab Take 1 Tab by mouth every 48 hours. 36 Tab 3   • ascorbic acid (VITAMIN C) 500 MG tablet Take 1 Tab by mouth every day. (Patient taking differently: Take 500 mg by mouth every 48 hours.) 30 Each 3   • Multiple Vitamins-Minerals (CENTRUM SILVER) TABS Take  by mouth every day.     • Magnesium 400 MG CAPS Take  by mouth every 48 hours as needed.     • Calcium Carbonate-Vitamin D (CALCIUM 600 + D PO) Take  by mouth every day.     • Cholecalciferol (VITAMIN D) 2000 UNITS CAPS Take  by mouth every day.       No current facility-administered medications for this visit.        Allergies   Allergen Reactions   • Penicillins Hives       ROS  Constitutional: Negative. Negative for fever, chills, weight loss, malaise/fatigue and diaphoresis.   HENT: Negative. Negative for hearing loss,  "ear pain, nosebleeds, congestion, sore throat, neck pain, tinnitus and ear discharge.   Respiratory: Negative. Negative for cough, hemoptysis, sputum production, shortness of breath, wheezing and stridor.   Cardiovascular: Negative. Negative for chest pain, palpitations, orthopnea, claudication, leg swelling and PND.   Gastrointestinal: Denies nausea, vomiting, diarrhea, constipation, heartburn, melena or hematochezia.  Genitourinary: Denies dysuria, hematuria, urinary incontinence, frequency or urgency.        Objective:     /70 (BP Location: Right arm, Patient Position: Sitting)   Pulse 68   Temp 35.9 °C (96.6 °F) (Temporal)   Ht 1.562 m (5' 1.5\")   Wt 54.9 kg (121 lb)   SpO2 97%  Body mass index is 22.49 kg/m².    Physical Exam:  Vitals reviewed.  Constitutional: Oriented to person, place, and time. appears well-developed and well-nourished. No distress.   Cardiovascular: Normal rate, regular rhythm, normal heart sounds and intact distal pulses. Exam reveals no gallop and no friction rub. No murmur heard. No carotid bruits.   Pulmonary/Chest: Effort normal and breath sounds normal. No stridor. No respiratory distress. no wheezes or rales. exhibits no tenderness.   Musculoskeletal: Normal range of motion. exhibits no edema. nedra pedal pulses 2+.  Lymphadenopathy: No cervical or supraclavicular adenopathy.   Neurological: Alert and oriented to person, place, and time. exhibits normal muscle tone.  Skin: Skin is warm and dry. No diaphoresis.   Psychiatric: Normal mood and affect. Behavior is normal.   CN 2=11 intact.  PERRLA  EKG in office read by me:  NSR, WNL.  RSR' in V1, V2.     Assessment and Plan:     The following treatment plan was discussed:    1. History of meningioma  EKG - Clinic Performed    EC-ECHOCARDIOGRAM COMPLETE W/O CONT    US-CAROTID DOPPLER BILAT    MR-BRAIN-W/O    new onset memory loss sudden wiht dizziness.  do echo, MRI brain, EKG and carotid US.  f/u for review.  consider neuro " referral if all wnl   2. Memory loss of unknown cause  EKG - Clinic Performed    EC-ECHOCARDIOGRAM COMPLETE W/O CONT    US-CAROTID DOPPLER BILAT    MR-BRAIN-W/O   3. Dizziness  EKG - Clinic Performed    EC-ECHOCARDIOGRAM COMPLETE W/O CONT    US-CAROTID DOPPLER BILAT    MR-BRAIN-W/O         Followup: Return in about 4 weeks (around 12/24/2019).

## 2019-12-09 ENCOUNTER — SLEEP CENTER VISIT (OUTPATIENT)
Dept: SLEEP MEDICINE | Facility: MEDICAL CENTER | Age: 74
End: 2019-12-09
Payer: MEDICARE

## 2019-12-09 VITALS
HEIGHT: 61 IN | RESPIRATION RATE: 16 BRPM | DIASTOLIC BLOOD PRESSURE: 62 MMHG | OXYGEN SATURATION: 94 % | BODY MASS INDEX: 23.41 KG/M2 | SYSTOLIC BLOOD PRESSURE: 124 MMHG | HEART RATE: 78 BPM | WEIGHT: 124 LBS

## 2019-12-09 DIAGNOSIS — Z78.9 NONSMOKER: ICD-10-CM

## 2019-12-09 DIAGNOSIS — G47.33 OSA (OBSTRUCTIVE SLEEP APNEA): Chronic | ICD-10-CM

## 2019-12-09 PROCEDURE — 99213 OFFICE O/P EST LOW 20 MIN: CPT | Performed by: NURSE PRACTITIONER

## 2019-12-09 NOTE — PROGRESS NOTES
Chief Complaint   Patient presents with   • Apnea     APAP 6-12 cm       HPI:  Hallie Fisher is a 74 y.o. year old female here today for follow-up on MONIK.  Prior PSG indicated severe sleep apnea with an AHI of 41.7/h and a javier of 83%.  Patient currently uses auto CPAP 6 to 12 cm nightly.  Compliance card 11/9/2019 through 12/8/2019 indicates 93.3% compliance, average nightly use of 6 hours 30 minutes, mean pressure 7.2 cm, minimal mask leaking with an overall AHI of 3.9/h.  She tolerates mask and pressure well.  She denies morning headaches.  She notes consistent daytime energy levels.  She overall feels well in therapy.  She feels she sleeps well at night.  She denies any significant cardiac or respiratory symptoms.  No major changes in health over the last year.          ROS: As per HPI and otherwise negative if not stated.    Past Medical History:   Diagnosis Date   • Anesthesia     PONV   • BPPV (benign paroxysmal positional vertigo)    • Closed fracture of radius    • History of meningioma 11/26/2019   • Hyperlipidemia    • Sleep apnea     CPAP - PMA   • Torn rotator cuff     right.  has seen Omar and will have [facility] in 1/16       Past Surgical History:   Procedure Laterality Date   • SHOULDER DECOMPRESSION ARTHROSCOPIC Right 3/1/2016    Procedure: SHOULDER DECOMPRESSION ARTHROSCOPIC - SUBACROMIAL, LABRAL DEBRIDMENT;  Surgeon: Shama Nelson M.D.;  Location: [facility];  Service:    • SHOULDER ARTHROSCOPY W/ ROTATOR CUFF REPAIR Right 3/1/2016    Procedure: SHOULDER ARTHROSCOPY W/ ROTATOR CUFF REPAIR;  Surgeon: Shama Nelson M.D.;  Location: [facility];  Service:    • WRIST ORIF  9/4/2013    Performed by Prasanna Anthony M.D. at [facility]   • CARPAL TUNNEL RELEASE  9/4/2013    Performed by Prasanna Anthony M.D. at [facility]   • KNEE ARTHROSCOPY Left 2009     - Dr. Newman; left   • LUMPECTOMY Right 1970's    right breast cyst  "removal   • TONSILLECTOMY  as child       Family History   Problem Relation Age of Onset   • Lung Disease Mother         emphysema   • Alcohol/Drug Mother    • Alcohol/Drug Father    • Stroke Brother        Social History     Socioeconomic History   • Marital status:      Spouse name: Not on file   • Number of children: Not on file   • Years of education: Not on file   • Highest education level: Not on file   Occupational History   • Not on file   Social Needs   • Financial resource strain: Not on file   • Food insecurity:     Worry: Not on file     Inability: Not on file   • Transportation needs:     Medical: Not on file     Non-medical: Not on file   Tobacco Use   • Smoking status: Never Smoker   • Smokeless tobacco: Never Used   Substance and Sexual Activity   • Alcohol use: Yes     Comment: one per week or once a month    • Drug use: No   • Sexual activity: Not on file     Comment: ,  [employer],part time; no kids   Lifestyle   • Physical activity:     Days per week: Not on file     Minutes per session: Not on file   • Stress: Not on file   Relationships   • Social connections:     Talks on phone: Not on file     Gets together: Not on file     Attends [employer] service: Not on file     Active member of club or organization: Not on file     Attends meetings of clubs or organizations: Not on file     Relationship status: Not on file   • Intimate partner violence:     Fear of current or ex partner: Not on file     Emotionally abused: Not on file     Physically abused: Not on file     Forced sexual activity: Not on file   Other Topics Concern   • Not on file   Social History Narrative   • Not on file       Allergies as of 12/09/2019 - Reviewed 12/09/2019   Allergen Reaction Noted   • Penicillins Hives 02/19/2016        Vitals:  /62   Pulse 78   Resp 16   Ht 1.549 m (5' 1\")   Wt 56.2 kg (124 lb)   SpO2 94%     Current medications as of today   Current Outpatient Medications "   Medication Sig Dispense Refill   • atorvastatin (LIPITOR) 10 MG Tab Take 1 Tab by mouth every 48 hours. 36 Tab 3   • ascorbic acid (VITAMIN C) 500 MG tablet Take 1 Tab by mouth every day. (Patient taking differently: Take 500 mg by mouth every 48 hours.) 30 Each 3   • Multiple Vitamins-Minerals (CENTRUM SILVER) TABS Take  by mouth every day.     • Magnesium 400 MG CAPS Take  by mouth every 48 hours as needed.     • Calcium Carbonate-Vitamin D (CALCIUM 600 + D PO) Take  by mouth every day.     • Cholecalciferol (VITAMIN D) 2000 UNITS CAPS Take  by mouth every day.       No current facility-administered medications for this visit.          Physical Exam:   Gen:           Alert and oriented, No apparent distress. Mood and affect appropriate, normal interaction with examiner.  Eyes:          PERRL, EOM intact, sclere white, conjunctive moist.  Ears:          Not examined.   Hearing:     Grossly intact.  Nose:          Normal, no lesions or deformities.  Dentition:    Good dentition.  Oropharynx:   Tongue normal, posterior pharynx without erythema or exudate.  Mallampati Classification: not examined.  Neck:        Supple, trachea midline, no masses.  Respiratory Effort: No intercostal retractions or use of accessory muscles.   Lung Auscultation:      Clear to auscultation bilaterally; no rales, rhonchi or wheezing.  CV:            Regular rate and rhythm. No murmurs, rubs or gallops.  Abd:           Not examined.   Lymphadenopathy: Not examined.  Gait and Station: Normal.  Digits and Nails: No clubbing, cyanosis, petechiae, or nodes.   Cranial Nerves: II-XII grossly intact.  Skin:        No rashes, lesions or ulcers noted.               Ext:           No cyanosis or edema.      Assessment:  1. MONIK (obstructive sleep apnea)     2. BMI 23.0-23.9, adult     3. Nonsmoker         Immunizations:    Flu:9/2019  Pneumovax 23:8/2013  Prevnar 13:2/2016    Plan:  1.  MONIK is clinically stable.  Continue CPAP nightly.  DME  mask/supplies.  2.  Discussed with hygiene.  3.  Follow-up with primary care for other health concerns.  4.  Follow-up in 1 year's compliance report, sooner if needed.    Please note that this dictation was created using voice recognition software. I have made every reasonable attempt to correct obvious errors, but it is possible there are errors of grammar and possibly content that I did not discover before finalizing the note.

## 2019-12-10 ENCOUNTER — TELEPHONE (OUTPATIENT)
Dept: MEDICAL GROUP | Facility: MEDICAL CENTER | Age: 74
End: 2019-12-10

## 2019-12-10 ENCOUNTER — HOSPITAL ENCOUNTER (OUTPATIENT)
Dept: RADIOLOGY | Facility: MEDICAL CENTER | Age: 74
End: 2019-12-10
Attending: NURSE PRACTITIONER
Payer: MEDICARE

## 2019-12-10 DIAGNOSIS — R42 DIZZINESS: ICD-10-CM

## 2019-12-10 DIAGNOSIS — Z86.018 HISTORY OF MENINGIOMA: ICD-10-CM

## 2019-12-10 DIAGNOSIS — R41.3 MEMORY LOSS OF UNKNOWN CAUSE: ICD-10-CM

## 2019-12-10 PROCEDURE — 70551 MRI BRAIN STEM W/O DYE: CPT

## 2019-12-10 NOTE — TELEPHONE ENCOUNTER
Please let pt know that the MRI brain is wnl.  There is no meningioma or reason for memory loss.    Have her do echo and carotid us.  Then f/u with me to discuss what to do from here.

## 2019-12-11 ENCOUNTER — HOSPITAL ENCOUNTER (OUTPATIENT)
Dept: RADIOLOGY | Facility: MEDICAL CENTER | Age: 74
End: 2019-12-11
Attending: NURSE PRACTITIONER
Payer: MEDICARE

## 2019-12-11 ENCOUNTER — HOSPITAL ENCOUNTER (OUTPATIENT)
Dept: CARDIOLOGY | Facility: MEDICAL CENTER | Age: 74
End: 2019-12-11
Attending: NURSE PRACTITIONER
Payer: MEDICARE

## 2019-12-11 ENCOUNTER — TELEPHONE (OUTPATIENT)
Dept: MEDICAL GROUP | Facility: MEDICAL CENTER | Age: 74
End: 2019-12-11

## 2019-12-11 DIAGNOSIS — Z86.018 HISTORY OF MENINGIOMA: ICD-10-CM

## 2019-12-11 DIAGNOSIS — R42 DIZZINESS: ICD-10-CM

## 2019-12-11 DIAGNOSIS — R41.3 MEMORY LOSS OF UNKNOWN CAUSE: ICD-10-CM

## 2019-12-11 LAB
LV EJECT FRACT  99904: 55
LV EJECT FRACT MOD 2C 99903: 59.8
LV EJECT FRACT MOD 4C 99902: 54.97
LV EJECT FRACT MOD BP 99901: 58.21

## 2019-12-11 PROCEDURE — 93880 EXTRACRANIAL BILAT STUDY: CPT | Mod: 26 | Performed by: INTERNAL MEDICINE

## 2019-12-11 PROCEDURE — 93306 TTE W/DOPPLER COMPLETE: CPT

## 2019-12-11 PROCEDURE — 93880 EXTRACRANIAL BILAT STUDY: CPT

## 2019-12-11 PROCEDURE — 93306 TTE W/DOPPLER COMPLETE: CPT | Mod: 26 | Performed by: INTERNAL MEDICINE

## 2019-12-11 NOTE — TELEPHONE ENCOUNTER
Please let pt know that the carotid us does not show any indication of significant plaque in her carotid arteries.

## 2019-12-12 NOTE — TELEPHONE ENCOUNTER
Patient notified.  Patient wants to know if aortic valve abnormality may have caused temporary memory loss?

## 2019-12-12 NOTE — TELEPHONE ENCOUNTER
Please let pt know that the carotid us is wnl.  The echo is wnl except mild abn on aortic valve.  We will continue to monitor

## 2020-01-13 DIAGNOSIS — E78.5 HYPERLIPIDEMIA LDL GOAL <100: ICD-10-CM

## 2020-01-13 RX ORDER — ATORVASTATIN CALCIUM 10 MG/1
10 TABLET, FILM COATED ORAL
Qty: 36 TAB | Refills: 3 | Status: SHIPPED | OUTPATIENT
Start: 2020-01-13 | End: 2020-07-15 | Stop reason: SDUPTHER

## 2020-02-13 ENCOUNTER — PATIENT OUTREACH (OUTPATIENT)
Dept: HEALTH INFORMATION MANAGEMENT | Facility: OTHER | Age: 75
End: 2020-02-13

## 2020-02-13 NOTE — PROGRESS NOTES
Called member to wish a happy belated birthday and introduce myself as her SCP . The members  answered and stated that she was out of town. I left my message and call back number with him to pass along to the member. If the member calls back, please transfer to [phone].    Attempt # 1

## 2020-04-14 SDOH — ECONOMIC STABILITY: FOOD INSECURITY: WITHIN THE PAST 12 MONTHS, THE FOOD YOU BOUGHT JUST DIDN'T LAST AND YOU DIDN'T HAVE MONEY TO GET MORE.: NEVER TRUE

## 2020-04-14 SDOH — ECONOMIC STABILITY: FOOD INSECURITY: WITHIN THE PAST 12 MONTHS, YOU WORRIED THAT YOUR FOOD WOULD RUN OUT BEFORE YOU GOT MONEY TO BUY MORE.: NEVER TRUE

## 2020-04-14 SDOH — ECONOMIC STABILITY: TRANSPORTATION INSECURITY
IN THE PAST 12 MONTHS, HAS THE LACK OF TRANSPORTATION KEPT YOU FROM MEDICAL APPOINTMENTS OR FROM GETTING MEDICATIONS?: NO

## 2020-04-14 SDOH — ECONOMIC STABILITY: TRANSPORTATION INSECURITY
IN THE PAST 12 MONTHS, HAS LACK OF TRANSPORTATION KEPT YOU FROM MEETINGS, WORK, OR FROM GETTING THINGS NEEDED FOR DAILY LIVING?: NO

## 2020-04-14 NOTE — PROGRESS NOTES
1. [facility]Connect Verified: yes    2. Verify PCP: yes    3. Review and add  to Care Team: yes    5. Reviewed/Updated the following with patient:       •   Communication Preference Obtained? YES  • MyChart Activation: already active       •   E-Mail Address Obtained? YES       •   Appointment Day and Time Preferences? YES       •   Preferred Pharmacy? YES       •   Preferred Lab? YES    6. Care Gap Scheduling (Attempt to Schedule EACH Overdue Care Gap!)    Scheduled patient for Annual Wellness Visit

## 2020-07-15 ENCOUNTER — OFFICE VISIT (OUTPATIENT)
Dept: MEDICAL GROUP | Facility: MEDICAL CENTER | Age: 75
End: 2020-07-15
Payer: MEDICARE

## 2020-07-15 VITALS
BODY MASS INDEX: 22.26 KG/M2 | WEIGHT: 121 LBS | OXYGEN SATURATION: 97 % | HEART RATE: 61 BPM | SYSTOLIC BLOOD PRESSURE: 120 MMHG | HEIGHT: 62 IN | TEMPERATURE: 98.2 F | DIASTOLIC BLOOD PRESSURE: 66 MMHG

## 2020-07-15 DIAGNOSIS — Z12.31 ENCOUNTER FOR SCREENING MAMMOGRAM FOR MALIGNANT NEOPLASM OF BREAST: ICD-10-CM

## 2020-07-15 DIAGNOSIS — G47.33 OSA (OBSTRUCTIVE SLEEP APNEA): Chronic | ICD-10-CM

## 2020-07-15 DIAGNOSIS — Z86.018 HISTORY OF MENINGIOMA: ICD-10-CM

## 2020-07-15 DIAGNOSIS — S52.102D CLOSED FRACTURE OF PROXIMAL END OF LEFT RADIUS WITH ROUTINE HEALING, UNSPECIFIED FRACTURE MORPHOLOGY, SUBSEQUENT ENCOUNTER: ICD-10-CM

## 2020-07-15 DIAGNOSIS — N95.9 POST MENOPAUSAL PROBLEMS: ICD-10-CM

## 2020-07-15 DIAGNOSIS — H81.10 BENIGN PAROXYSMAL POSITIONAL VERTIGO, UNSPECIFIED LATERALITY: ICD-10-CM

## 2020-07-15 DIAGNOSIS — E78.5 HYPERLIPIDEMIA LDL GOAL <100: ICD-10-CM

## 2020-07-15 DIAGNOSIS — M25.511 ACUTE PAIN OF RIGHT SHOULDER: ICD-10-CM

## 2020-07-15 DIAGNOSIS — R41.3 MEMORY LOSS: ICD-10-CM

## 2020-07-15 PROCEDURE — 8041 PR SCP AHA: Performed by: NURSE PRACTITIONER

## 2020-07-15 PROCEDURE — G0439 PPPS, SUBSEQ VISIT: HCPCS | Performed by: NURSE PRACTITIONER

## 2020-07-15 RX ORDER — ATORVASTATIN CALCIUM 10 MG/1
10 TABLET, FILM COATED ORAL
Qty: 36 TAB | Refills: 3 | Status: SHIPPED | OUTPATIENT
Start: 2020-07-15 | End: 2021-05-04 | Stop reason: SDUPTHER

## 2020-07-15 ASSESSMENT — ACTIVITIES OF DAILY LIVING (ADL): BATHING_REQUIRES_ASSISTANCE: 0

## 2020-07-15 ASSESSMENT — PATIENT HEALTH QUESTIONNAIRE - PHQ9: CLINICAL INTERPRETATION OF PHQ2 SCORE: 0

## 2020-07-15 ASSESSMENT — ENCOUNTER SYMPTOMS: GENERAL WELL-BEING: GOOD

## 2020-07-15 NOTE — PROGRESS NOTES
Chief Complaint   Patient presents with   • Annual Wellness Visit         HPI:  Hallie is a 75 y.o. here for Medicare Annual Wellness Visit.    She has a hx of rt shoulder pain.  She has been playing tennis.  She has a hx of shoulder surgery with Dr Nelson.  Recently she noted pain again in AC joint.  She is still playing tennis.  Then pain is mild.  No numbness.    She is stable on lipitor.  Taking med approp.  Needs refill.  She is due updated lab for her chol  She is due mammo and dexa.    She retired last feb.  She is has noted some mild changes in her memory.  Does not get lost.  She will forget some names of people but then later will remember them.      MONIK (obstructive sleep apnea)  Stable on CPAP.  Followed by sleep clinic    Hyperlipidemia LDL goal <100  She is stable on med.  She is due updated CMP, LP.      History of meningioma  This is chronic and long term. 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  No headaches or other sx    Closed fracture of left radius with routine healing  Healed with plate in place.  No pain    BPPV (benign paroxysmal positional vertigo)  No recent attacks but keeps meclizine with her just in case.          Patient Active Problem List    Diagnosis Date Noted   • History of meningioma 11/26/2019   • Anesthesia    • Hyperlipidemia LDL goal <100 03/08/2017   • Torn rotator cuff    • BPPV (benign paroxysmal positional vertigo)    • Closed fracture of left radius with routine healing 09/04/2013   • MONIK (obstructive sleep apnea) 08/19/2013       Current Outpatient Medications   Medication Sig Dispense Refill   • atorvastatin (LIPITOR) 10 MG Tab Take 1 Tab by mouth every 48 hours. 36 Tab 3   • ascorbic acid (VITAMIN C) 500 MG tablet Take 1 Tab by mouth every day. (Patient taking differently: Take 500 mg by mouth every 48 hours.) 30 Each 3   • Multiple Vitamins-Minerals (CENTRUM SILVER) TABS Take  by mouth every day.     • Magnesium  400 MG CAPS Take  by mouth every 48 hours as needed.     • Calcium Carbonate-Vitamin D (CALCIUM 600 + D PO) Take  by mouth every day.     • Cholecalciferol (VITAMIN D) 2000 UNITS CAPS Take  by mouth every day.       No current facility-administered medications for this visit.         Patient is taking medications as noted in medication list.  Current supplements as per medication list.     Allergies: Penicillins    Current social contact/activities: Patient reports playing tennis, going to the [employer] diner     Is patient current with immunizations? No, due for SHINGRIX (Shingles). Patient is interested in receiving NONE today.    She  reports that she has never smoked. She has never used smokeless tobacco. She reports current alcohol use. She reports that she does not use drugs.  Counseling given: Not Answered        DPA/Advanced directive: Patient has Advanced Directive on file.     ROS:    Gait: Uses no assistive device     Ostomy: No   Other tubes: No   Amputations: No   Chronic oxygen use No   Last eye exam Patient reports a couple of years ago   Wears hearing aids: No   : Denies any urinary leakage during the last 6 months        Depression Screening    Little interest or pleasure in doing things?  0 - not at all  Feeling down, depressed, or hopeless? 0 - not at all  Patient Health Questionnaire Score: 0    If depressive symptoms identified deferred to follow up visit unless specifically addressed in assessment and plan.    Interpretation of PHQ-9 Total Score   Score Severity   1-4 No Depression   5-9 Mild Depression   10-14 Moderate Depression   15-19 Moderately Severe Depression   20-27 Severe Depression    Screening for Cognitive Impairment    Three Minute Recall (river, nation, finger)  3/3    Tristin clock face with all 12 numbers and set the hands to show 10 past 11.  Yes 5/5  If cognitive concerns identified, deferred for follow up unless specifically addressed in assessment and plan.    Fall  Risk Assessment    Has the patient had two or more falls in the last year or any fall with injury in the last year?  No  If fall risk identified, deferred for follow up unless specifically addressed in assessment and plan.    Safety Assessment    Throw rugs on floor.  Yes  Handrails on all stairs.  Yes  Good lighting in all hallways.  Yes  Difficulty hearing.  No  Patient counseled about all safety risks that were identified.    Functional Assessment ADLs    Are there any barriers preventing you from cooking for yourself or meeting nutritional needs?  No.    Are there any barriers preventing you from driving safely or obtaining transportation?  No.    Are there any barriers preventing you from using a telephone or calling for help?  No.    Are there any barriers preventing you from shopping?  No.    Are there any barriers preventing you from taking care of your own finances?  No.    Are there any barriers preventing you from managing your medications?  No.    Are there any barriers preventing you from showering, bathing or dressing yourself?  No.    Are you currently engaging in any exercise or physical activity?  Yes.  Patient reports playing tennis  What is your perception of your health?  Good.    Health Maintenance Summary                HEPATITIS C SCREENING Overdue 1945     IMM ZOSTER VACCINES Overdue 7/18/2014      Done 5/23/2014 Imm Admin: Zoster Vaccine Live (ZVL) (Zostavax)    COLONOSCOPY Overdue 7/11/2017      Done 7/11/2012 REFERRAL TO GI FOR COLONOSCOPY     Patient has more history with this topic...    Annual Wellness Visit Overdue 2/21/2019      Done 2/20/2018      Patient has more history with this topic...    MAMMOGRAM Overdue 7/2/2020      Done 7/2/2019 MA-SCREENING MAMMO BILAT W/TOMOSYNTHESIS W/CAD     Patient has more history with this topic...    IMM INFLUENZA Next Due 9/1/2020      Done 9/17/2019 Imm Admin: Influenza Vaccine Adult HD     Patient has more history with this topic...    BONE  "DENSITY Next Due 11/6/2020      Done 11/6/2015 DS-BONE DENSITY STUDY (DEXA)     Patient has more history with this topic...    IMM DTaP/Tdap/Td Vaccine Next Due 8/19/2023      Done 8/19/2013 Imm Admin: Tdap Vaccine          Patient Care Team:  BLAKE Cadena as PCP - General (Family Medicine)  [facility] as Home Health Provider (DME Supplier)  Katherine Stern as [facility]     Social History     Tobacco Use   • Smoking status: Never Smoker   • Smokeless tobacco: Never Used   Substance Use Topics   • Alcohol use: Yes     Comment: one per week or once a month    • Drug use: No     Family History   Problem Relation Age of Onset   • Lung Disease Mother         emphysema   • Alcohol/Drug Mother    • Alcohol/Drug Father    • Stroke Brother      She  has a past medical history of Anesthesia, BPPV (benign paroxysmal positional vertigo), Closed fracture of radius, History of meningioma (11/26/2019), Hyperlipidemia, Sleep apnea, and Torn rotator cuff.   Past Surgical History:   Procedure Laterality Date   • SHOULDER DECOMPRESSION ARTHROSCOPIC Right 3/1/2016    Procedure: SHOULDER DECOMPRESSION ARTHROSCOPIC - SUBACROMIAL, LABRAL DEBRIDMENT;  Surgeon: Shama Nelson M.D.;  Location: [facility];  Service:    • SHOULDER ARTHROSCOPY W/ ROTATOR CUFF REPAIR Right 3/1/2016    Procedure: SHOULDER ARTHROSCOPY W/ ROTATOR CUFF REPAIR;  Surgeon: Shama Nelson M.D.;  Location: [facility];  Service:    • WRIST ORIF  9/4/2013    Performed by Prasanna Anthony M.D. at [facility]   • CARPAL TUNNEL RELEASE  9/4/2013    Performed by Prasanna Anthony M.D. at [facility]   • KNEE ARTHROSCOPY Left 2009     - Dr. Newman; left   • LUMPECTOMY Right 1970's    right breast cyst removal   • TONSILLECTOMY  as child           Exam:     /66   Pulse 61   Temp 36.8 °C (98.2 °F) (Temporal)   Ht 1.575 m (5' 2\")   Wt 54.9 kg (121 lb)   SpO2 97%  Body " mass index is 22.13 kg/m².    Hearing excellent.    Dentition good  Alert, oriented in no acute distress.  Eye contact is good, speech goal directed, affect calm  Physical Exam   Vitals reviewed.  Constitutional: oriented to person, place, and time. appears well-developed and well-nourished. No distress.   HENT: Head: Normocephalic and atraumatic. Bilateral tympanic membranes wnl w/o bulging.  Right Ear: External ear normal. Left Ear: External ear normal. Nose: Nose normal.  Mouth/Throat: Oropharynx is clear and moist. No oropharyngeal exudate. nedra tm wnl. Eyes: Conjunctivae and EOM are normal. Pupils are equal, round, and reactive to light. Right eye exhibits no discharge. Left eye exhibits no discharge. No scleral icterus.    Neck: Normal range of motion. Neck supple. No JVD present.   Cardiovascular: Normal rate, regular rhythm, normal heart sounds and intact distal pulses.  Exam reveals no gallop and no friction rub.  No murmur heard.  No carotid bruits   Pulmonary/Chest: Effort normal and breath sounds normal. No stridor. No respiratory distress. no wheezes or rales. exhibits no tenderness.   Abdominal: Soft. Bowel sounds are normal. exhibits no distension and no mass. No tenderness. no rebound and no guarding.   Musculoskeletal: Normal range of motion. exhibits no edema or tenderness.  nedra pedal pulses 2+.  Lymphadenopathy:  no cervical or supraclavicular adenopathy.   Neurological: alert and oriented to person, place, and time. has normal reflexes. displays normal reflexes. No cranial nerve deficit. exhibits normal muscle tone. Coordination normal.   Skin: Skin is warm and dry. No rash noted. no diaphoresis. No erythema. No pallor.   Psychiatric: normal mood and affect. behavior is normal.         Assessment and Plan. The following treatment and monitoring plan is recommended:    1. Hyperlipidemia LDL goal <100  Subsequent Annual Wellness Visit - Includes PPPS ()    atorvastatin (LIPITOR) 10 MG Tab     Comp Metabolic Panel    Lipid Profile    stable on meds.  refilled meds.  due for updated lab.  do lab and f/u w/pt w/results.  f/u yearly or sooner if lab abn   2. Memory loss  Subsequent Annual Wellness Visit - Includes PPPS ()    TSH    VITAMIN B12    mild.  check lab with TSH, B12.  f/u w/pt with results.  then f/u yearly or sooner if lab abn   3. Acute pain of right shoulder  Subsequent Annual Wellness Visit - Includes PPPS ()    DX-SHOULDER 2+ RIGHT    Diclofenac Sodium 1 % Gel    new onset of pain.  do shoulder xray and try voltaren.  consider referral back to [facility] if pain persists.   4. MONIK (obstructive sleep apnea)  Subsequent Annual Wellness Visit - Includes PPPS ()    stable on CPAP. followed by pulm   5. History of meningioma  Subsequent Annual Wellness Visit - Includes PPPS ()    stable long term.  no tx or w/u needed   6. Closed fracture of proximal end of left radius with routine healing, unspecified fracture morphology, subsequent encounter  Subsequent Annual Wellness Visit - Includes PPPS ()    resolved with healing   7. Benign paroxysmal positional vertigo, unspecified laterality  Subsequent Annual Wellness Visit - Includes PPPS ()    occas sx only tx w/meclizine   8. Encounter for screening mammogram for malignant neoplasm of breast  Subsequent Annual Wellness Visit - Includes PPPS ()    MA-SCREENING MAMMO BILAT W/CAD   9. Post menopausal problems  Subsequent Annual Wellness Visit - Includes PPPS ()    DS-BONE DENSITY STUDY (DEXA)    dexa scan           Services suggested: No services needed at this time  Health Care Screening recommendations as per orders if indicated.  Referrals offered: PT/OT/Nutrition counseling/Behavioral Health/Smoking cessation as per orders if indicated.    Discussion today about general wellness and lifestyle habits:    · Prevent falls and reduce trip hazards; Cautioned about securing or removing rugs.  · Have a working fire  alarm and carbon monoxide detector;   · Engage in regular physical activity and social activities       Follow-up: 1 yr or sooner if lab abn

## 2020-07-20 ENCOUNTER — TELEPHONE (OUTPATIENT)
Dept: MEDICAL GROUP | Facility: MEDICAL CENTER | Age: 75
End: 2020-07-20

## 2020-07-20 ENCOUNTER — HOSPITAL ENCOUNTER (OUTPATIENT)
Dept: RADIOLOGY | Facility: MEDICAL CENTER | Age: 75
End: 2020-07-20
Attending: NURSE PRACTITIONER
Payer: MEDICARE

## 2020-07-20 DIAGNOSIS — M25.511 ACUTE PAIN OF RIGHT SHOULDER: ICD-10-CM

## 2020-07-20 PROCEDURE — 73030 X-RAY EXAM OF SHOULDER: CPT | Mod: RT

## 2020-07-20 NOTE — TELEPHONE ENCOUNTER
Please let pt know that the shoulder xray shows prob tendonitis and old changes assoc with prior surgery.  i can refer to ortho for poss joint injection if she would like tx.

## 2020-07-27 ENCOUNTER — HOSPITAL ENCOUNTER (OUTPATIENT)
Dept: LAB | Facility: MEDICAL CENTER | Age: 75
End: 2020-07-27
Attending: NURSE PRACTITIONER
Payer: MEDICARE

## 2020-07-27 DIAGNOSIS — E78.5 HYPERLIPIDEMIA LDL GOAL <100: ICD-10-CM

## 2020-07-27 DIAGNOSIS — R41.3 MEMORY LOSS: ICD-10-CM

## 2020-07-27 LAB
ALBUMIN SERPL BCP-MCNC: 4.4 G/DL (ref 3.2–4.9)
ALBUMIN/GLOB SERPL: 2.1 G/DL
ALP SERPL-CCNC: 109 U/L (ref 30–99)
ALT SERPL-CCNC: 14 U/L (ref 2–50)
ANION GAP SERPL CALC-SCNC: 10 MMOL/L (ref 7–16)
AST SERPL-CCNC: 19 U/L (ref 12–45)
BILIRUB SERPL-MCNC: 0.4 MG/DL (ref 0.1–1.5)
BUN SERPL-MCNC: 9 MG/DL (ref 8–22)
CALCIUM SERPL-MCNC: 9.4 MG/DL (ref 8.5–10.5)
CHLORIDE SERPL-SCNC: 101 MMOL/L (ref 96–112)
CHOLEST SERPL-MCNC: 165 MG/DL (ref 100–199)
CO2 SERPL-SCNC: 25 MMOL/L (ref 20–33)
CREAT SERPL-MCNC: 0.63 MG/DL (ref 0.5–1.4)
FASTING STATUS PATIENT QL REPORTED: NORMAL
GLOBULIN SER CALC-MCNC: 2.1 G/DL (ref 1.9–3.5)
GLUCOSE SERPL-MCNC: 97 MG/DL (ref 65–99)
HDLC SERPL-MCNC: 64 MG/DL
LDLC SERPL CALC-MCNC: 82 MG/DL
POTASSIUM SERPL-SCNC: 3.9 MMOL/L (ref 3.6–5.5)
PROT SERPL-MCNC: 6.5 G/DL (ref 6–8.2)
SODIUM SERPL-SCNC: 136 MMOL/L (ref 135–145)
TRIGL SERPL-MCNC: 94 MG/DL (ref 0–149)
TSH SERPL DL<=0.005 MIU/L-ACNC: 3.88 UIU/ML (ref 0.38–5.33)
VIT B12 SERPL-MCNC: 654 PG/ML (ref 211–911)

## 2020-07-27 PROCEDURE — 36415 COLL VENOUS BLD VENIPUNCTURE: CPT

## 2020-07-27 PROCEDURE — 80061 LIPID PANEL: CPT

## 2020-07-27 PROCEDURE — 84443 ASSAY THYROID STIM HORMONE: CPT

## 2020-07-27 PROCEDURE — 80053 COMPREHEN METABOLIC PANEL: CPT

## 2020-07-27 PROCEDURE — 82607 VITAMIN B-12: CPT

## 2020-07-28 ENCOUNTER — HOSPITAL ENCOUNTER (OUTPATIENT)
Dept: RADIOLOGY | Facility: MEDICAL CENTER | Age: 75
End: 2020-07-28
Attending: NURSE PRACTITIONER
Payer: MEDICARE

## 2020-07-28 DIAGNOSIS — N95.9 POST MENOPAUSAL PROBLEMS: ICD-10-CM

## 2020-07-28 PROCEDURE — 77080 DXA BONE DENSITY AXIAL: CPT

## 2020-09-02 ENCOUNTER — HOSPITAL ENCOUNTER (OUTPATIENT)
Dept: RADIOLOGY | Facility: MEDICAL CENTER | Age: 75
End: 2020-09-02
Attending: NURSE PRACTITIONER
Payer: MEDICARE

## 2020-09-02 DIAGNOSIS — Z12.31 ENCOUNTER FOR SCREENING MAMMOGRAM FOR MALIGNANT NEOPLASM OF BREAST: ICD-10-CM

## 2020-09-02 PROCEDURE — 77067 SCR MAMMO BI INCL CAD: CPT

## 2020-09-03 ENCOUNTER — TELEPHONE (OUTPATIENT)
Dept: MEDICAL GROUP | Facility: MEDICAL CENTER | Age: 75
End: 2020-09-03

## 2020-09-08 NOTE — TELEPHONE ENCOUNTER
Left a message for patient to call back at [phone].     Beverly Duncan  [facility] Assistant

## 2020-09-29 ENCOUNTER — OFFICE VISIT (OUTPATIENT)
Dept: URGENT CARE | Facility: CLINIC | Age: 75
End: 2020-09-29
Payer: MEDICARE

## 2020-09-29 ENCOUNTER — APPOINTMENT (OUTPATIENT)
Dept: RADIOLOGY | Facility: IMAGING CENTER | Age: 75
End: 2020-09-29
Attending: PHYSICIAN ASSISTANT
Payer: MEDICARE

## 2020-09-29 VITALS
OXYGEN SATURATION: 100 % | HEIGHT: 62 IN | HEART RATE: 66 BPM | DIASTOLIC BLOOD PRESSURE: 60 MMHG | SYSTOLIC BLOOD PRESSURE: 110 MMHG | TEMPERATURE: 98.6 F | WEIGHT: 119 LBS | BODY MASS INDEX: 21.9 KG/M2

## 2020-09-29 DIAGNOSIS — S20.212A CONTUSION OF RIB ON LEFT SIDE, INITIAL ENCOUNTER: ICD-10-CM

## 2020-09-29 DIAGNOSIS — R07.81 RIB PAIN ON LEFT SIDE: ICD-10-CM

## 2020-09-29 PROCEDURE — 71101 X-RAY EXAM UNILAT RIBS/CHEST: CPT | Mod: TC,FY,LT | Performed by: PHYSICIAN ASSISTANT

## 2020-09-29 PROCEDURE — 99214 OFFICE O/P EST MOD 30 MIN: CPT | Performed by: PHYSICIAN ASSISTANT

## 2020-09-29 ASSESSMENT — ENCOUNTER SYMPTOMS
CHILLS: 0
FEVER: 0
SHORTNESS OF BREATH: 0
VOMITING: 0
DIZZINESS: 0
WEAKNESS: 0
DIARRHEA: 0
NUMBNESS: 0
NAUSEA: 0

## 2020-09-29 ASSESSMENT — PAIN SCALES - GENERAL: PAINLEVEL: 8=MODERATE-SEVERE PAIN

## 2020-09-29 NOTE — PROGRESS NOTES
"Subjective:      Hallie Fisher is a 75 y.o. female who presents with Back Pain (x 1 day, tennis injury)            Rib Injury  This is a new problem. The current episode started in the past 7 days (4 days). The problem occurs constantly. The problem has been unchanged. Pertinent negatives include no chills, congestion, fever, nausea, numbness, rash, vomiting or weakness. The symptoms are aggravated by twisting and coughing. She has tried nothing for the symptoms.     Patient presents to urgent care reporting a 4 day history of left sided rib pain after getting hit in the area 4 days ago with a tennis ball. Pain is worsened with certain movements and with coughing. No prior rib injuries. She denies chest pain or difficulty breathing. No history of prior rib injuries. She hasn't taken any OTC medications for the pain.     Review of Systems   Constitutional: Negative for chills and fever.   HENT: Negative for congestion.    Respiratory: Negative for shortness of breath.    Gastrointestinal: Negative for diarrhea, nausea and vomiting.   Genitourinary: Negative.    Musculoskeletal:        + rib pain   Skin: Negative for rash.   Neurological: Negative for dizziness, weakness and numbness.        Objective:     /60 (BP Location: Right arm, Patient Position: Sitting)   Pulse 66   Temp 37 °C (98.6 °F) (Temporal)   Ht 1.562 m (5' 1.5\")   Wt 54 kg (119 lb)   SpO2 100%   BMI 22.12 kg/m²      Physical Exam  Vitals signs and nursing note reviewed.   Constitutional:       General: She is not in acute distress.     Appearance: Normal appearance. She is well-developed. She is not diaphoretic.   HENT:      Head: Normocephalic and atraumatic.      Right Ear: External ear normal.      Left Ear: External ear normal.      Nose: Nose normal.   Eyes:      Pupils: Pupils are equal, round, and reactive to light.   Neck:      Musculoskeletal: Normal range of motion.   Cardiovascular:      Rate and Rhythm: Normal rate and " regular rhythm.      Heart sounds: Normal heart sounds. No murmur.   Pulmonary:      Effort: Pulmonary effort is normal.      Breath sounds: Normal breath sounds. No wheezing or rales.      Comments: Left rib pain with deep inspiration  Chest:          Comments: No ecchymosis of left ribs. + TTP over left lateral ribs   Musculoskeletal: Normal range of motion.   Skin:     General: Skin is warm and dry.   Neurological:      Mental Status: She is alert and oriented to person, place, and time.   Psychiatric:         Behavior: Behavior normal.            PMH:  has a past medical history of Anesthesia, BPPV (benign paroxysmal positional vertigo), Closed fracture of radius, History of meningioma (11/26/2019), Hyperlipidemia, Sleep apnea, and Torn rotator cuff.  MEDS:   Current Outpatient Medications:   •  atorvastatin (LIPITOR) 10 MG Tab, Take 1 Tab by mouth every 48 hours., Disp: 36 Tab, Rfl: 3  •  Diclofenac Sodium 1 % Gel, Apply 1 g to skin as directed 2 times a day as needed., Disp: 60 g, Rfl: 1  •  ascorbic acid (VITAMIN C) 500 MG tablet, Take 1 Tab by mouth every day. (Patient taking differently: Take 500 mg by mouth every 48 hours.), Disp: 30 Each, Rfl: 3  •  Multiple Vitamins-Minerals (CENTRUM SILVER) TABS, Take  by mouth every day., Disp: , Rfl:   •  Magnesium 400 MG CAPS, Take  by mouth every 48 hours as needed., Disp: , Rfl:   •  Calcium Carbonate-Vitamin D (CALCIUM 600 + D PO), Take  by mouth every day., Disp: , Rfl:   •  Cholecalciferol (VITAMIN D) 2000 UNITS CAPS, Take  by mouth every day., Disp: , Rfl:   ALLERGIES:   Allergies   Allergen Reactions   • Penicillins Hives     SURGHX:   Past Surgical History:   Procedure Laterality Date   • SHOULDER DECOMPRESSION ARTHROSCOPIC Right 3/1/2016    Procedure: SHOULDER DECOMPRESSION ARTHROSCOPIC - SUBACROMIAL, LABRAL DEBRIDMENT;  Surgeon: Shama Nelson M.D.;  Location: SURGERY [facility];  Service:    • SHOULDER ARTHROSCOPY W/ ROTATOR CUFF REPAIR Right  3/1/2016    Procedure: SHOULDER ARTHROSCOPY W/ ROTATOR CUFF REPAIR;  Surgeon: Shama Nelson M.D.;  Location: SURGERY [facility];  Service:    • WRIST ORIF  9/4/2013    Performed by Prasanna Anthony M.D. at [facility]   • CARPAL TUNNEL RELEASE  9/4/2013    Performed by Prasanna Anthony M.D. at [facility]   • KNEE ARTHROSCOPY Left 2009     - Dr. Newman; left   • LUMPECTOMY Right 1970's    right breast cyst removal   • TONSILLECTOMY  as child     SOCHX:  reports that she has never smoked. She has never used smokeless tobacco. She reports current alcohol use. She reports that she does not use drugs.  FH: family history includes Alcohol/Drug in her father and mother; Lung Disease in her mother; Stroke in her brother.       Assessment/Plan:        1. Contusion of rib on left side, initial encounter    - [id number] (WITH 1-VIEW CXR) LEFT; Future  IMPRESSION:     1.  Unremarkable left rib series.    Encouraged icing/heating 2-3 times daily followed by gentle massage and stretching. OTC nsaids as needed for pain. Call or return to office if symptoms persist or worsen. The patient demonstrated a good understanding and agreed with the treatment plan.

## 2020-11-16 ENCOUNTER — OFFICE VISIT (OUTPATIENT)
Dept: MEDICAL GROUP | Facility: MEDICAL CENTER | Age: 75
End: 2020-11-16
Payer: MEDICARE

## 2020-11-16 VITALS
TEMPERATURE: 97.8 F | HEART RATE: 88 BPM | BODY MASS INDEX: 22.08 KG/M2 | OXYGEN SATURATION: 95 % | WEIGHT: 120 LBS | HEIGHT: 62 IN | DIASTOLIC BLOOD PRESSURE: 60 MMHG | SYSTOLIC BLOOD PRESSURE: 126 MMHG
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
## 2020-11-16 DIAGNOSIS — S70.01XA HIP HEMATOMA, RIGHT, INITIAL ENCOUNTER: ICD-10-CM

## 2020-11-16 PROCEDURE — 99214 OFFICE O/P EST MOD 30 MIN: CPT | Performed by: NURSE PRACTITIONER

## 2020-11-16 NOTE — PROGRESS NOTES
Subjective:     Hallie Fisher is a 75 y.o. female who presents with fall.    HPI:     Seen in f/u for fall.  She was playing tennis and the wind blew hard and pushed her down.  She landed hard on her rt hip.  She had pain in rt hip.  Has a huge bruise and lump on her rt hip/thigh.  She has no pain.  Able to have full ROM on hip.  Has already been back to playing tennis.  No LOC.  No head injury.        Patient Active Problem List    Diagnosis Date Noted   • History of meningioma 11/26/2019   • Anesthesia    • Hyperlipidemia LDL goal <100 03/08/2017   • Torn rotator cuff    • BPPV (benign paroxysmal positional vertigo)    • Closed fracture of left radius with routine healing 09/04/2013   • MONIK (obstructive sleep apnea) 08/19/2013       Current medicines (including changes today)  Current Outpatient Medications   Medication Sig Dispense Refill   • atorvastatin (LIPITOR) 10 MG Tab Take 1 Tab by mouth every 48 hours. 36 Tab 3   • Diclofenac Sodium 1 % Gel Apply 1 g to skin as directed 2 times a day as needed. 60 g 1   • ascorbic acid (VITAMIN C) 500 MG tablet Take 1 Tab by mouth every day. (Patient taking differently: Take 500 mg by mouth every 48 hours.) 30 Each 3   • Multiple Vitamins-Minerals (CENTRUM SILVER) TABS Take  by mouth every day.     • Magnesium 400 MG CAPS Take  by mouth every 48 hours as needed.     • Calcium Carbonate-Vitamin D (CALCIUM 600 + D PO) Take  by mouth every day.     • Cholecalciferol (VITAMIN D) 2000 UNITS CAPS Take  by mouth every day.       No current facility-administered medications for this visit.        Allergies   Allergen Reactions   • Penicillins Hives       ROS  Constitutional: Negative. Negative for fever, chills, weight loss, malaise/fatigue and diaphoresis.   HENT: Negative. Negative for hearing loss, ear pain, nosebleeds, congestion, sore throat, neck pain, tinnitus and ear discharge.   Respiratory: Negative. Negative for cough, hemoptysis, sputum production, shortness  "of breath, wheezing and stridor.   Cardiovascular: Negative. Negative for chest pain, palpitations, orthopnea, claudication, leg swelling and PND.   Gastrointestinal: Denies nausea, vomiting, diarrhea, constipation, heartburn, melena or hematochezia.  Genitourinary: Denies dysuria, hematuria, urinary incontinence, frequency or urgency.        Objective:     /60 (BP Location: Right arm, Patient Position: Sitting)   Pulse 88   Temp 36.6 °C (97.8 °F) (Temporal)   Ht 1.562 m (5' 1.5\")   Wt 54.4 kg (120 lb)   SpO2 95%  Body mass index is 22.31 kg/m².    Physical Exam:  Vitals reviewed.  Constitutional: Oriented to person, place, and time. appears well-developed and well-nourished. No distress.   Cardiovascular: Normal rate, regular rhythm, normal heart sounds and intact distal pulses. Exam reveals no gallop and no friction rub. No murmur heard. No carotid bruits.   Pulmonary/Chest: Effort normal and breath sounds normal. No stridor. No respiratory distress. no wheezes or rales. exhibits no tenderness.   Musculoskeletal: Normal range of motion rt hip and leg. exhibits no edema. nedra pedal pulses 2+.  Lymphadenopathy: No cervical or supraclavicular adenopathy.   Neurological: Alert and oriented to person, place, and time. exhibits normal muscle tone.  Skin: Skin is warm and dry. No diaphoresis.  Large bruise noted rt hip.  Upper thigh above bruise is fist sized firm hematoma.    Psychiatric: Normal mood and affect. Behavior is normal.      Assessment and Plan:     The following treatment plan was discussed:    1. Fall, initial encounter  DX-HIP-UNILATERAL-WITH PELVIS-1 VIEW RIGHT    use heat at site.  f/u if area worsens or becomes painful.  do xray if not getting better.  exp to pt that may take 1-2 months for it to resolve completely   2. Hip hematoma, right, initial encounter  DX-HIP-UNILATERAL-WITH PELVIS-1 VIEW RIGHT         Followup: Return if symptoms worsen or fail to improve.  "

## 2020-11-27 ENCOUNTER — OFFICE VISIT (OUTPATIENT)
Dept: URGENT CARE | Facility: CLINIC | Age: 75
End: 2020-11-27
Payer: MEDICARE

## 2020-11-27 ENCOUNTER — HOSPITAL ENCOUNTER (OUTPATIENT)
Facility: MEDICAL CENTER | Age: 75
End: 2020-11-27
Attending: FAMILY MEDICINE
Payer: MEDICARE

## 2020-11-27 VITALS
WEIGHT: 120 LBS | HEIGHT: 62 IN | OXYGEN SATURATION: 97 % | HEART RATE: 83 BPM | TEMPERATURE: 97.8 F | DIASTOLIC BLOOD PRESSURE: 60 MMHG | BODY MASS INDEX: 22.08 KG/M2 | RESPIRATION RATE: 17 BRPM | SYSTOLIC BLOOD PRESSURE: 136 MMHG

## 2020-11-27 DIAGNOSIS — R51.9 BILATERAL HEADACHES: ICD-10-CM

## 2020-11-27 DIAGNOSIS — Z20.822 EXPOSURE TO 2019 NOVEL CORONAVIRUS: ICD-10-CM

## 2020-11-27 LAB — COVID ORDER STATUS COVID19: NORMAL

## 2020-11-27 PROCEDURE — 99214 OFFICE O/P EST MOD 30 MIN: CPT | Mod: CS | Performed by: FAMILY MEDICINE

## 2020-11-27 PROCEDURE — U0003 INFECTIOUS AGENT DETECTION BY NUCLEIC ACID (DNA OR RNA); SEVERE ACUTE RESPIRATORY SYNDROME CORONAVIRUS 2 (SARS-COV-2) (CORONAVIRUS DISEASE [COVID-19]), AMPLIFIED PROBE TECHNIQUE, MAKING USE OF HIGH THROUGHPUT TECHNOLOGIES AS DESCRIBED BY CMS-2020-01-R: HCPCS

## 2020-11-27 ASSESSMENT — ENCOUNTER SYMPTOMS: HEADACHES: 1

## 2020-11-27 NOTE — PROGRESS NOTES
Subjective:      Hallie Fisher is a 75 y.o. female who presents with Coronavirus Screening (headache, exposure 11/20)      - This is a pleasant and nontoxic appearing 75 y.o. female with c/o mild HA x 3-4 days. + recent cv19 exposure. No NVFC. No cp/sob. no recent head trauma or focal limb weakness              ALLERGIES:  Penicillins     PMH:  Past Medical History:   Diagnosis Date   • Anesthesia     PONV   • BPPV (benign paroxysmal positional vertigo)    • Closed fracture of radius    • History of meningioma 11/26/2019   • Hyperlipidemia    • Sleep apnea     CPAP - PMA   • Torn rotator cuff     right.  has seen Omar and will have surgeyr in 1/16        PSH:  Past Surgical History:   Procedure Laterality Date   • SHOULDER DECOMPRESSION ARTHROSCOPIC Right 3/1/2016    Procedure: SHOULDER DECOMPRESSION ARTHROSCOPIC - SUBACROMIAL, LABRAL DEBRIDMENT;  Surgeon: Shama Nelson M.D.;  Location: [facility];  Service:    • SHOULDER ARTHROSCOPY W/ ROTATOR CUFF REPAIR Right 3/1/2016    Procedure: SHOULDER ARTHROSCOPY W/ ROTATOR CUFF REPAIR;  Surgeon: Shama Nelson M.D.;  Location: SURGERY [facility];  Service:    • WRIST ORIF  9/4/2013    Performed by Prasanna Anthony M.D. at [facility]   • CARPAL TUNNEL RELEASE  9/4/2013    Performed by Prasanna Anthony M.D. at [facility]   • KNEE ARTHROSCOPY Left 2009     - Dr. Newman; left   • LUMPECTOMY Right 1970's    right breast cyst removal   • TONSILLECTOMY  as child       MEDS:    Current Outpatient Medications:   •  atorvastatin (LIPITOR) 10 MG Tab, Take 1 Tab by mouth every 48 hours., Disp: 36 Tab, Rfl: 3  •  Diclofenac Sodium 1 % Gel, Apply 1 g to skin as directed 2 times a day as needed., Disp: 60 g, Rfl: 1  •  ascorbic acid (VITAMIN C) 500 MG tablet, Take 1 Tab by mouth every day. (Patient taking differently: Take 500 mg by mouth every 48 hours.), Disp: 30 Each, Rfl: 3  •  Multiple Vitamins-Minerals (CENTRUM  "SILVER) TABS, Take  by mouth every day., Disp: , Rfl:   •  Magnesium 400 MG CAPS, Take  by mouth every 48 hours as needed., Disp: , Rfl:   •  Calcium Carbonate-Vitamin D (CALCIUM 600 + D PO), Take  by mouth every day., Disp: , Rfl:   •  Cholecalciferol (VITAMIN D) 2000 UNITS CAPS, Take  by mouth every day., Disp: , Rfl:     ** I have documented what I find to be significant in regards to past medical, social, family and surgical history  in my HPI or under PMH/PSH/FH review section, otherwise it is noncontributory **           HPI    Review of Systems   Neurological: Positive for headaches.   All other systems reviewed and are negative.         Objective:     /60 (BP Location: Right arm, Patient Position: Sitting, BP Cuff Size: Adult)   Pulse 83   Temp 36.6 °C (97.8 °F) (Temporal)   Resp 17   Ht 1.575 m (5' 2\")   Wt 54.4 kg (120 lb)   SpO2 97%   BMI 21.95 kg/m²      Physical Exam  Vitals signs and nursing note reviewed.   Constitutional:       General: She is not in acute distress.     Appearance: She is well-developed. She is not diaphoretic.   HENT:      Head: Normocephalic and atraumatic.   Eyes:      General: No scleral icterus.     Conjunctiva/sclera: Conjunctivae normal.   Cardiovascular:      Heart sounds: Normal heart sounds. No murmur.   Pulmonary:      Effort: Pulmonary effort is normal. No respiratory distress.      Breath sounds: Normal breath sounds.   Skin:     Coloration: Skin is not pale.      Findings: No rash.   Neurological:      Mental Status: She is alert.      Motor: No abnormal muscle tone.   Psychiatric:         Mood and Affect: Mood normal.         Behavior: Behavior normal.         Judgment: Judgment normal.                 Assessment/Plan:            No diagnosis found.      - Dx & d/c instructions discussed w/ patient and/or family members.   - rest/hydrate  - E.R. precautions discussed       Follow up with their PCP in 2-3 days strongly advised, ER if not improving or " feeling/getting worse.

## 2020-11-28 LAB
SARS-COV-2 RNA RESP QL NAA+PROBE: NOTDETECTED
SPECIMEN SOURCE: NORMAL

## 2020-12-23 ENCOUNTER — OFFICE VISIT (OUTPATIENT)
Dept: URGENT CARE | Facility: CLINIC | Age: 75
End: 2020-12-23
Payer: MEDICARE

## 2020-12-23 VITALS
TEMPERATURE: 97.8 F | DIASTOLIC BLOOD PRESSURE: 62 MMHG | BODY MASS INDEX: 21.71 KG/M2 | RESPIRATION RATE: 16 BRPM | HEIGHT: 62 IN | OXYGEN SATURATION: 96 % | SYSTOLIC BLOOD PRESSURE: 120 MMHG | HEART RATE: 73 BPM | WEIGHT: 118 LBS

## 2020-12-23 DIAGNOSIS — H10.9 CONJUNCTIVITIS OF LEFT EYE, UNSPECIFIED CONJUNCTIVITIS TYPE: ICD-10-CM

## 2020-12-23 DIAGNOSIS — H00.025 HORDEOLUM INTERNUM OF LEFT LOWER EYELID: ICD-10-CM

## 2020-12-23 PROCEDURE — 99214 OFFICE O/P EST MOD 30 MIN: CPT | Performed by: PHYSICIAN ASSISTANT

## 2020-12-23 RX ORDER — POLYMYXIN B SULFATE AND TRIMETHOPRIM 1; 10000 MG/ML; [USP'U]/ML
1 SOLUTION OPHTHALMIC EVERY 4 HOURS
Qty: 10 ML | Refills: 0 | Status: SHIPPED | OUTPATIENT
Start: 2020-12-23 | End: 2020-12-28

## 2020-12-24 NOTE — PROGRESS NOTES
Subjective:      Hallie Fisher is a 75 y.o. female who presents with Redness Or Discharge From Eye (lt eye x1 wk )    Medications:    • ascorbic acid  • atorvastatin Tabs  • CALCIUM 600 + D PO  • Centrum Silver Tabs  • Diclofenac Sodium Gel  • Magnesium Caps  • Vitamin D Caps    Allergies: Penicillins    Problem List: Hallie Fisher has MONIK (obstructive sleep apnea); Closed fracture of left radius with routine healing; Torn rotator cuff; BPPV (benign paroxysmal positional vertigo); Hyperlipidemia LDL goal <100; Anesthesia; and History of meningioma on their problem list.    Surgical History:  Past Surgical History:   Procedure Laterality Date   • SHOULDER DECOMPRESSION ARTHROSCOPIC Right 3/1/2016    Procedure: SHOULDER DECOMPRESSION ARTHROSCOPIC - SUBACROMIAL, LABRAL DEBRIDMENT;  Surgeon: Shama Nelson M.D.;  Location: [facility];  Service:    • SHOULDER ARTHROSCOPY W/ ROTATOR CUFF REPAIR Right 3/1/2016    Procedure: SHOULDER ARTHROSCOPY W/ ROTATOR CUFF REPAIR;  Surgeon: Shama Nelson M.D.;  Location: [facility];  Service:    • WRIST ORIF  9/4/2013    Performed by Prasanna Anthony M.D. at [facility]   • CARPAL TUNNEL RELEASE  9/4/2013    Performed by Prasanna Antohny M.D. at [facility]   • KNEE ARTHROSCOPY Left 2009     - Dr. Newman; left   • LUMPECTOMY Right 1970's    right breast cyst removal   • TONSILLECTOMY  as child       Past Social Hx: Hallie Fisher  reports that she has never smoked. She has never used smokeless tobacco. She reports current alcohol use. She reports that she does not use drugs.     Past Family Hx:  Hallie Fisher family history includes Alcohol/Drug in her father and mother; Lung Disease in her mother; Stroke in her brother.     Problem list, medications, and allergies reviewed by myself today in Epic.           Patient presents with:  Redness Or Discharge From Eye: lt eye x1 wk , stye on bottom eyelid  "not getting better with otc stye cream.  Pt denies contact lens use.        Eye Problem   The left eye is affected. This is a new problem. The current episode started in the past 7 days. The problem occurs constantly. The problem has been gradually worsening. There was no injury mechanism. The pain is at a severity of 5/10. The pain is moderate. There is no known exposure to pink eye. She does not wear contacts. Associated symptoms include an eye discharge and eye redness. Pertinent negatives include no blurred vision, double vision, fever, foreign body sensation, itching, photophobia or recent URI. She has tried eye drops for the symptoms. The treatment provided no relief.       Review of Systems   Constitutional: Negative for fever.   Eyes: Positive for discharge and redness. Negative for blurred vision, double vision, photophobia, pain and itching.   All other systems reviewed and are negative.         Objective:     /62   Pulse 73   Temp 36.6 °C (97.8 °F) (Temporal)   Resp 16   Ht 1.575 m (5' 2\")   Wt 53.5 kg (118 lb)   SpO2 96%   BMI 21.58 kg/m²      Physical Exam  Vitals signs and nursing note reviewed.   Constitutional:       General: She is not in acute distress.     Appearance: Normal appearance. She is well-developed and normal weight. She is not ill-appearing or toxic-appearing.   HENT:      Head: Normocephalic and atraumatic.      Right Ear: External ear normal.      Left Ear: External ear normal.      Nose: Nose normal.      Mouth/Throat:      Mouth: Mucous membranes are moist.   Eyes:      General: Lids are everted, no foreign bodies appreciated. Vision grossly intact. Gaze aligned appropriately.         Left eye: Discharge and hordeolum present.     Extraocular Movements: Extraocular movements intact.      Conjunctiva/sclera:      Left eye: Left conjunctiva is injected.      Pupils: Pupils are equal, round, and reactive to light.     Neck:      Musculoskeletal: Normal range of motion and " neck supple.   Cardiovascular:      Rate and Rhythm: Normal rate and regular rhythm.      Heart sounds: Normal heart sounds.   Pulmonary:      Effort: Pulmonary effort is normal.      Breath sounds: Normal breath sounds.   Musculoskeletal: Normal range of motion.   Lymphadenopathy:      Cervical: No cervical adenopathy.   Skin:     General: Skin is warm and dry.      Capillary Refill: Capillary refill takes less than 2 seconds.   Neurological:      Mental Status: She is alert and oriented to person, place, and time.      Gait: Gait normal.   Psychiatric:         Mood and Affect: Mood normal.         Behavior: Behavior normal.              Assessment/Plan:          1. Conjunctivitis of left eye, unspecified conjunctivitis type  polymixin-trimethoprim (POLYTRIM) 48360-9.1 UNIT/ML-% Solution   2. Hordeolum internum of left lower eyelid  polymixin-trimethoprim (POLYTRIM) 34063-5.1 UNIT/ML-% Solution     Patient was evaluated in clinic today while wearing appropriate personal protective equipment.      PT to begin medications today as prescribed.    PT can use cool/warm compress on affected area for relief of symptoms.     PT should follow up with PCP in 1-2 days for re-evaluation if symptoms have not improved.  Discussed red flags and reasons to return to UC or ED.  Pt and/or family verbalized understanding of diagnosis and follow up instructions and was offered informational handout on diagnosis.  PT discharged.

## 2020-12-30 ASSESSMENT — ENCOUNTER SYMPTOMS
EYE PAIN: 0
EYE ITCHING: 0
EYE REDNESS: 1
PHOTOPHOBIA: 0
BLURRED VISION: 0
EYE DISCHARGE: 1
FOREIGN BODY SENSATION: 0
DOUBLE VISION: 0
FEVER: 0

## 2020-12-30 ASSESSMENT — VISUAL ACUITY: OU: 1

## 2021-01-15 DIAGNOSIS — Z23 NEED FOR VACCINATION: ICD-10-CM

## 2021-01-19 ENCOUNTER — OFFICE VISIT (OUTPATIENT)
Dept: SLEEP MEDICINE | Facility: MEDICAL CENTER | Age: 76
End: 2021-01-19
Payer: MEDICARE

## 2021-01-19 VITALS
DIASTOLIC BLOOD PRESSURE: 56 MMHG | HEIGHT: 62 IN | HEART RATE: 87 BPM | WEIGHT: 120 LBS | SYSTOLIC BLOOD PRESSURE: 130 MMHG | OXYGEN SATURATION: 100 % | BODY MASS INDEX: 22.08 KG/M2 | RESPIRATION RATE: 16 BRPM

## 2021-01-19 DIAGNOSIS — Z78.9 NONSMOKER: ICD-10-CM

## 2021-01-19 DIAGNOSIS — G47.33 OSA (OBSTRUCTIVE SLEEP APNEA): Chronic | ICD-10-CM

## 2021-01-19 PROCEDURE — 99214 OFFICE O/P EST MOD 30 MIN: CPT | Performed by: NURSE PRACTITIONER

## 2021-01-19 NOTE — PROGRESS NOTES
Chief Complaint   Patient presents with   • Apnea     Last Seen 12/9/19        HPI:  Hallie Fisher is a 75 y.o. year old female here today for follow-up on MONIK.    Annual f/u today  Prior PSG indicated severe sleep apnea with an AHI of 41.7/h and a javier of 83%.  Patient currently uses auto CPAP 6 to 12 cm nightly.  Compliance card 12/20/20-1/18/21 notes 100% compliance, avg nightly use of 5hr 9min, mean pressure 7.3cm, minimal mask leak with reduced Ahi 5.3/hr. Reviewed with patient. She feels she sleeps well on therapy in general but has had fragmented sleep due to stress of life/world and pandemic which is new. We reviewed sleep hygiene. She is also not as active due to covid scare in her tennis club. She is awaiting vaccine before returning to that. She tolerates mask and pressure well. No AM headaches. She denies cardiac or respiratory symptoms.    ROS: As per HPI and otherwise negative if not stated.    Past Medical History:   Diagnosis Date   • Anesthesia     PONV   • BPPV (benign paroxysmal positional vertigo)    • Closed fracture of radius    • History of meningioma 11/26/2019   • Hyperlipidemia    • Sleep apnea     CPAP - PMA   • Torn rotator cuff     right.  has seen Omar and will have [facility] in 1/16       Past Surgical History:   Procedure Laterality Date   • SHOULDER DECOMPRESSION ARTHROSCOPIC Right 3/1/2016    Procedure: SHOULDER DECOMPRESSION ARTHROSCOPIC - SUBACROMIAL, LABRAL DEBRIDMENT;  Surgeon: Shama Nelson M.D.;  Location: [facility];  Service:    • SHOULDER ARTHROSCOPY W/ ROTATOR CUFF REPAIR Right 3/1/2016    Procedure: SHOULDER ARTHROSCOPY W/ ROTATOR CUFF REPAIR;  Surgeon: Shama Nelson M.D.;  Location: [facility];  Service:    • WRIST ORIF  9/4/2013    Performed by Prasanna Anthony M.D. at [facility]   • CARPAL TUNNEL RELEASE  9/4/2013    Performed by Prasanna Anthony M.D. at [facility]   • KNEE ARTHROSCOPY Left 2009      - Dr. [facility]; left   • LUMPECTOMY Right 1970's    right breast cyst removal   • TONSILLECTOMY  as child       Family History   Problem Relation Age of Onset   • Lung Disease Mother         emphysema   • Alcohol/Drug Mother    • Alcohol/Drug Father    • Stroke Brother        Social History     Socioeconomic History   • Marital status:      Spouse name: Not on file   • Number of children: Not on file   • Years of education: Not on file   • Highest education level: Not on file   Occupational History   • Not on file   Social Needs   • Financial resource strain: Not on file   • Food insecurity     Worry: Never true     Inability: Never true   • Transportation needs     Medical: No     Non-medical: No   Tobacco Use   • Smoking status: Never Smoker   • Smokeless tobacco: Never Used   Substance and Sexual Activity   • Alcohol use: Yes     Comment: one per week or once a month    • Drug use: No   • Sexual activity: Yes     Partners: Male     Comment: ,  [employer],part time; no kids   Lifestyle   • Physical activity     Days per week: Not on file     Minutes per session: Not on file   • Stress: Not on file   Relationships   • Social connections     Talks on phone: Not on file     Gets together: Not on file     Attends Confucianist service: Not on file     Active member of club or organization: Not on file     Attends meetings of clubs or organizations: Not on file     Relationship status: Not on file   • Intimate partner violence     Fear of current or ex partner: Not on file     Emotionally abused: Not on file     Physically abused: Not on file     Forced sexual activity: Not on file   Other Topics Concern   • Not on file   Social History Narrative   • Not on file       Allergies as of 01/19/2021 - Reviewed 01/19/2021   Allergen Reaction Noted   • Penicillins Hives 02/19/2016        Vitals:  /56 (BP Location: Left arm, Patient Position: Sitting, BP Cuff Size: Adult)   Pulse 87   Resp 16  "  Ht 1.575 m (5' 2\")   Wt 54.4 kg (120 lb)   SpO2 100%     Current medications as of today   Current Outpatient Medications   Medication Sig Dispense Refill   • atorvastatin (LIPITOR) 10 MG Tab Take 1 Tab by mouth every 48 hours. 36 Tab 3   • Multiple Vitamins-Minerals (CENTRUM SILVER) TABS Take  by mouth every day.     • Magnesium 400 MG CAPS Take  by mouth every 48 hours as needed.     • Calcium Carbonate-Vitamin D (CALCIUM 600 + D PO) Take  by mouth every day.     • Cholecalciferol (VITAMIN D) 2000 UNITS CAPS Take  by mouth every day.       No current facility-administered medications for this visit.          Physical Exam:   Gen:           Alert and oriented, No apparent distress. Mood and affect appropriate, normal interaction with examiner.  Eyes:          PERRL, EOM intact, sclere white, conjunctive moist.  Ears:          Not examined.   Hearing:     Grossly intact.  Nose:          Normal, no lesions or deformities.  Dentition:    mask  Oropharynx:   mask  Mallampati Classification: mask  Neck:        Supple, trachea midline, no masses.  Respiratory Effort: No intercostal retractions or use of accessory muscles.   Lung Auscultation:      Clear to auscultation bilaterally; no rales, rhonchi or wheezing.  CV:            Regular rate and rhythm. No murmurs, rubs or gallops.  Abd:           Not examined.   Lymphadenopathy: Not examined.  Gait and Station: Normal.  Digits and Nails: No clubbing, cyanosis, petechiae, or nodes.   Cranial Nerves: II-XII grossly intact.  Skin:        No rashes, lesions or ulcers noted.               Ext:           No cyanosis or edema.      Assessment:  1. MONIK (obstructive sleep apnea)     2. BMI 21.0-21.9, adult     3. Nonsmoker         Immunizations:    Flu:9/2020  Pneumovax 23:2013  Prevnar 13:2016    Plan:  1. MONIK is clinically stable. Continue CPAP nightly.  DME mask/supplies.  2. Discussed sleep hygiene and avoidance of tv/news/social media to reduce stress level. Increase " daily exercise.  3. F/u with PCP for other health concerns  4. F/u in 1 year with compliance report, sooner if needed.    Please note that this dictation was created using voice recognition software. I have made every reasonable attempt to correct obvious errors, but it is possible there are errors of grammar and possibly content that I did not discover before finalizing the note.

## 2021-02-18 ENCOUNTER — IMMUNIZATION (OUTPATIENT)
Dept: FAMILY PLANNING/WOMEN'S HEALTH CLINIC | Facility: IMMUNIZATION CENTER | Age: 76
End: 2021-02-18
Attending: INTERNAL MEDICINE
Payer: MEDICARE

## 2021-02-18 ENCOUNTER — TELEPHONE (OUTPATIENT)
Dept: MEDICAL GROUP | Facility: MEDICAL CENTER | Age: 76
End: 2021-02-18

## 2021-02-18 DIAGNOSIS — Z23 ENCOUNTER FOR VACCINATION: Primary | ICD-10-CM

## 2021-02-18 PROCEDURE — 0002A PFIZER SARS-COV-2 VACCINE: CPT | Performed by: INTERNAL MEDICINE

## 2021-02-18 PROCEDURE — 91300 PFIZER SARS-COV-2 VACCINE: CPT | Performed by: INTERNAL MEDICINE

## 2021-02-18 NOTE — TELEPHONE ENCOUNTER
ESTABLISHED PATIENT PRE-VISIT PLANNING     Patient was NOT contacted to complete PVP.     Note: Patient will not be contacted if there is no indication to call.     1.  Reviewed notes from the last few office visits within the medical group: Yes    2.  If any orders were placed at last visit or intended to be done for this visit (i.e. 6 mos follow-up), do we have Results/Consult Notes?         •  Labs - Labs were not ordered at last office visit.  Note: If patient appointment is for lab review and patient did not complete labs, check with provider if OK to reschedule patient until labs completed.       •  Imaging - Imaging ordered, appointment scheduled.       •  Referrals - Referral ordered, patient has NOT been seen.    3. Is this appointment scheduled as a Hospital Follow-Up? No    4.  Immunizations were updated in Epic using Reconcile Outside Information activity? Yes    5.  Patient is due for the following Health Maintenance Topics:   Health Maintenance Due   Topic Date Due   • HEPATITIS C SCREENING  1945   • IMM ZOSTER VACCINES (2 of 3) 07/18/2014   • COLONOSCOPY  07/11/2017   • COVID-19 Vaccine (2 of 2 - Pfizer series) 02/16/2021       6.  AHA (Pulse8) form printed for Provider? Email sent to [facility] requesting form      Outside information NOT reconciled using the Chinese Online feature. Per Seema Hathaway, the Chinese Online feature is down as of 02/09/2021 at 2:00pm. Will reconcile outside information at a later date.

## 2021-03-01 ENCOUNTER — APPOINTMENT (RX ONLY)
Dept: URBAN - METROPOLITAN AREA CLINIC 22 | Facility: CLINIC | Age: 76
Setting detail: DERMATOLOGY
End: 2021-03-01

## 2021-03-01 DIAGNOSIS — Z71.89 OTHER SPECIFIED COUNSELING: ICD-10-CM

## 2021-03-01 DIAGNOSIS — L57.0 ACTINIC KERATOSIS: ICD-10-CM

## 2021-03-01 DIAGNOSIS — L81.4 OTHER MELANIN HYPERPIGMENTATION: ICD-10-CM

## 2021-03-01 DIAGNOSIS — L82.1 OTHER SEBORRHEIC KERATOSIS: ICD-10-CM

## 2021-03-01 DIAGNOSIS — D18.0 HEMANGIOMA: ICD-10-CM

## 2021-03-01 DIAGNOSIS — L82.0 INFLAMED SEBORRHEIC KERATOSIS: ICD-10-CM

## 2021-03-01 DIAGNOSIS — D22 MELANOCYTIC NEVI: ICD-10-CM

## 2021-03-01 PROBLEM — D22.5 MELANOCYTIC NEVI OF TRUNK: Status: ACTIVE | Noted: 2021-03-01

## 2021-03-01 PROBLEM — D18.01 HEMANGIOMA OF SKIN AND SUBCUTANEOUS TISSUE: Status: ACTIVE | Noted: 2021-03-01

## 2021-03-01 PROCEDURE — ? COUNSELING

## 2021-03-01 PROCEDURE — 99213 OFFICE O/P EST LOW 20 MIN: CPT | Mod: 25

## 2021-03-01 PROCEDURE — 17003 DESTRUCT PREMALG LES 2-14: CPT

## 2021-03-01 PROCEDURE — ? LIQUID NITROGEN

## 2021-03-01 PROCEDURE — ? SUNSCREEN RECOMMENDATIONS

## 2021-03-01 PROCEDURE — 17000 DESTRUCT PREMALG LESION: CPT

## 2021-03-01 ASSESSMENT — LOCATION ZONE DERM
LOCATION ZONE: FACE
LOCATION ZONE: TRUNK
LOCATION ZONE: NOSE

## 2021-03-01 ASSESSMENT — LOCATION DETAILED DESCRIPTION DERM
LOCATION DETAILED: EPIGASTRIC SKIN
LOCATION DETAILED: RIGHT MID TEMPLE
LOCATION DETAILED: RIGHT INFERIOR TEMPLE
LOCATION DETAILED: INFERIOR MID FOREHEAD
LOCATION DETAILED: LEFT SUPERIOR MEDIAL UPPER BACK
LOCATION DETAILED: NASAL DORSUM
LOCATION DETAILED: RIGHT SUPERIOR MEDIAL MIDBACK

## 2021-03-01 ASSESSMENT — LOCATION SIMPLE DESCRIPTION DERM
LOCATION SIMPLE: RIGHT TEMPLE
LOCATION SIMPLE: NOSE
LOCATION SIMPLE: RIGHT LOWER BACK
LOCATION SIMPLE: ABDOMEN
LOCATION SIMPLE: LEFT UPPER BACK
LOCATION SIMPLE: INFERIOR FOREHEAD

## 2021-03-01 NOTE — PROCEDURE: LIQUID NITROGEN
Consent: The patient's consent was obtained including but not limited to risks of crusting, scabbing, blistering, scarring, darker or lighter pigmentary change, recurrence, incomplete removal and infection.
Detail Level: Detailed
Post-Care Instructions: I reviewed with the patient in detail post-care instructions. Patient is to wear sunprotection, and avoid picking at any of the treated lesions. Pt may apply Vaseline to crusted or scabbing areas.
Number Of Freeze-Thaw Cycles: 2 freeze-thaw cycles
Render Note In Bullet Format When Appropriate: No
Duration Of Freeze Thaw-Cycle (Seconds): 3

## 2021-05-04 DIAGNOSIS — E78.5 HYPERLIPIDEMIA LDL GOAL <100: ICD-10-CM

## 2021-05-04 RX ORDER — ATORVASTATIN CALCIUM 10 MG/1
10 TABLET, FILM COATED ORAL
Qty: 36 TABLET | Refills: 0 | Status: SHIPPED | OUTPATIENT
Start: 2021-05-04 | End: 2021-08-26 | Stop reason: SDUPTHER

## 2021-08-08 ENCOUNTER — OFFICE VISIT (OUTPATIENT)
Dept: URGENT CARE | Facility: CLINIC | Age: 76
End: 2021-08-08
Payer: MEDICARE

## 2021-08-08 VITALS
WEIGHT: 120 LBS | TEMPERATURE: 98.7 F | RESPIRATION RATE: 16 BRPM | BODY MASS INDEX: 22.08 KG/M2 | OXYGEN SATURATION: 97 % | DIASTOLIC BLOOD PRESSURE: 76 MMHG | SYSTOLIC BLOOD PRESSURE: 124 MMHG | HEIGHT: 62 IN | HEART RATE: 67 BPM

## 2021-08-08 DIAGNOSIS — H01.003 BLEPHARITIS OF EYELID OF RIGHT EYE, UNSPECIFIED EYELID, UNSPECIFIED TYPE: ICD-10-CM

## 2021-08-08 DIAGNOSIS — H57.8A9 SENSATION OF FOREIGN BODY IN EYE: ICD-10-CM

## 2021-08-08 PROCEDURE — 99213 OFFICE O/P EST LOW 20 MIN: CPT | Performed by: NURSE PRACTITIONER

## 2021-08-08 RX ORDER — ERYTHROMYCIN 5 MG/G
1 OINTMENT OPHTHALMIC 4 TIMES DAILY
Qty: 3.5 G | Refills: 0 | Status: SHIPPED | OUTPATIENT
Start: 2021-08-08 | End: 2021-08-15

## 2021-08-08 ASSESSMENT — ENCOUNTER SYMPTOMS
BLURRED VISION: 0
FOREIGN BODY SENSATION: 1
DOUBLE VISION: 0
EYE REDNESS: 1
PHOTOPHOBIA: 1

## 2021-08-08 NOTE — PROGRESS NOTES
Subjective:     Hallie Fisher is a 76 y.o. female who presents for Eye Problem (x2 days, Right eye redness, watery )      Has intermittent history of eyelashes growing towards her eye, causing irritation. Is usually able to find the eyelash and pluck it. Was previously seen by optometrist 7 years ago for similar occurrence. Has attempted to remove several lashes without relief. Eye is now irritated. No pain. Tried OTC drops and flushed eye. Watery discharge.        Eye Problem   The right eye is affected. This is a new problem. The current episode started yesterday. The problem occurs constantly. The problem has been unchanged. The patient is experiencing no pain. There is no known exposure to pink eye. She does not wear contacts. Associated symptoms include eye redness, a foreign body sensation and photophobia. Pertinent negatives include no blurred vision, double vision or recent URI. She has tried water for the symptoms. The treatment provided no relief.       Past Medical History:   Diagnosis Date   • Anesthesia     PONV   • BPPV (benign paroxysmal positional vertigo)    • Closed fracture of radius    • History of meningioma 11/26/2019   • Hyperlipidemia    • Sleep apnea     CPAP - PMA   • Torn rotator cuff     right.  has seen Omar and will have [facility] in 1/16       Past Surgical History:   Procedure Laterality Date   • SHOULDER DECOMPRESSION ARTHROSCOPIC Right 3/1/2016    Procedure: SHOULDER DECOMPRESSION ARTHROSCOPIC - SUBACROMIAL, LABRAL DEBRIDMENT;  Surgeon: Shama Nelson M.D.;  Location: [facility];  Service:    • SHOULDER ARTHROSCOPY W/ ROTATOR CUFF REPAIR Right 3/1/2016    Procedure: SHOULDER ARTHROSCOPY W/ ROTATOR CUFF REPAIR;  Surgeon: Shama Nelson M.D.;  Location: [facility];  Service:    • WRIST ORIF  9/4/2013    Performed by Prasanna Anthony M.D. at [facility]   • CARPAL TUNNEL RELEASE  9/4/2013    Performed by Prasanna Anthony M.D. at  [facility]   • KNEE ARTHROSCOPY Left 2009     - Dr. [facility]; left   • LUMPECTOMY Right 1970's    right breast cyst removal   • TONSILLECTOMY  as child       Social History     Socioeconomic History   • Marital status:      Spouse name: Not on file   • Number of children: Not on file   • Years of education: Not on file   • Highest education level: Not on file   Occupational History   • Not on file   Tobacco Use   • Smoking status: Never Smoker   • Smokeless tobacco: Never Used   Vaping Use   • Vaping Use: Never used   Substance and Sexual Activity   • Alcohol use: Yes     Comment: one per week or once a month    • Drug use: No   • Sexual activity: Yes     Partners: Male     Comment: ,  [employer],part time; no kids   Other Topics Concern   • Not on file   Social History Narrative   • Not on file     Social Determinants of Health     Financial Resource Strain:    • Difficulty of Paying Living Expenses:    Food Insecurity:    • Worried About Running Out of Food in the Last Year:    • Ran Out of Food in the Last Year:    Transportation Needs:    • Lack of Transportation (Medical):    • Lack of Transportation (Non-Medical):    Physical Activity:    • Days of Exercise per Week:    • Minutes of Exercise per Session:    Stress:    • Feeling of Stress :    Social Connections:    • Frequency of Communication with Friends and Family:    • Frequency of Social Gatherings with Friends and Family:    • Attends Yazidi Services:    • Active Member of Clubs or Organizations:    • Attends Club or Organization Meetings:    • Marital Status:    Intimate Partner Violence:    • Fear of Current or Ex-Partner:    • Emotionally Abused:    • Physically Abused:    • Sexually Abused:         Family History   Problem Relation Age of Onset   • Lung Disease Mother         emphysema   • Alcohol/Drug Mother    • Alcohol/Drug Father    • Stroke Brother         Allergies   Allergen Reactions   •  "Penicillins Hives       Review of Systems   Eyes: Positive for photophobia and redness. Negative for blurred vision and double vision.        Objective:   /76   Pulse 67   Temp 37.1 °C (98.7 °F) (Temporal)   Resp 16   Ht 1.575 m (5' 2\")   Wt 54.4 kg (120 lb)   SpO2 97%   Breastfeeding No   BMI 21.95 kg/m²     Physical Exam  Eyes:      General:         Right eye: No foreign body or hordeolum.      Extraocular Movements: Extraocular movements intact.      Conjunctiva/sclera:      Right eye: Right conjunctiva is injected. No chemosis, exudate or hemorrhage.     Pupils: Pupils are equal, round, and reactive to light.      Right eye: No corneal abrasion or fluorescein uptake.      Comments: Right eye: Mild erythema to upper and lower lash line. Mild injection. No foreign body. Watery.         Assessment/Plan:   1. Sensation of foreign body in eye  - REFERRAL TO OPHTHALMOLOGY    2. Blepharitis of eyelid of right eye, unspecified eyelid, unspecified type  - erythromycin 5 MG/GM Ointment; Apply 1 Application to right eye 4 times a day for 7 days.  Dispense: 3.5 g; Refill: 0  - REFERRAL TO OPHTHALMOLOGY  -Visual Acuity  -Woods Lamp exam  -Eyelid and hand hygiene.  -Warm compress.    -Cleanse and massage eyelid. Use warm water or diluted baby shampoo on a clean wash cloth, gauze pad, or cotton swab; and gently massage the edge of the eyelid with a gentle circular motions. Over the counter eyelid cleaning solutions are also available.  -Artificial tears to moisturize eyes.    Follow up for persistent or worsening symptoms, increased redness or swelling. Emergently for vision changes, decreased eye movement, new or increased pain.    Hx of left eye vision deficit. No deficit in vision of right eye. Discussed blepharitis possibly contributing to entropion, possibly causing a corneal abrasion. No foreign body or abrasion noted on exam.    Differential diagnosis, natural history, supportive care, and indications for " immediate follow-up discussed.

## 2021-08-18 ENCOUNTER — PATIENT MESSAGE (OUTPATIENT)
Dept: HEALTH INFORMATION MANAGEMENT | Facility: OTHER | Age: 76
End: 2021-08-18

## 2021-08-18 ENCOUNTER — PATIENT OUTREACH (OUTPATIENT)
Dept: HEALTH INFORMATION MANAGEMENT | Facility: OTHER | Age: 76
End: 2021-08-18

## 2021-08-26 ENCOUNTER — OFFICE VISIT (OUTPATIENT)
Dept: MEDICAL GROUP | Facility: MEDICAL CENTER | Age: 76
End: 2021-08-26
Payer: MEDICARE

## 2021-08-26 VITALS
HEIGHT: 62 IN | SYSTOLIC BLOOD PRESSURE: 120 MMHG | WEIGHT: 123.2 LBS | OXYGEN SATURATION: 96 % | HEART RATE: 79 BPM | DIASTOLIC BLOOD PRESSURE: 72 MMHG | BODY MASS INDEX: 22.67 KG/M2 | TEMPERATURE: 97.3 F

## 2021-08-26 DIAGNOSIS — E78.5 HYPERLIPIDEMIA LDL GOAL <100: ICD-10-CM

## 2021-08-26 DIAGNOSIS — H81.10 BENIGN PAROXYSMAL POSITIONAL VERTIGO, UNSPECIFIED LATERALITY: ICD-10-CM

## 2021-08-26 DIAGNOSIS — Z86.018 HISTORY OF MENINGIOMA: ICD-10-CM

## 2021-08-26 DIAGNOSIS — R92.30 DENSE BREASTS: ICD-10-CM

## 2021-08-26 DIAGNOSIS — G47.33 OSA (OBSTRUCTIVE SLEEP APNEA): Chronic | ICD-10-CM

## 2021-08-26 DIAGNOSIS — R41.3 MEMORY LOSS: ICD-10-CM

## 2021-08-26 DIAGNOSIS — S46.011S TRAUMATIC COMPLETE TEAR OF RIGHT ROTATOR CUFF, SEQUELA: ICD-10-CM

## 2021-08-26 DIAGNOSIS — G89.29 CHRONIC PAIN OF LEFT KNEE: ICD-10-CM

## 2021-08-26 DIAGNOSIS — R92.2 DENSE BREASTS: ICD-10-CM

## 2021-08-26 DIAGNOSIS — M25.562 CHRONIC PAIN OF LEFT KNEE: ICD-10-CM

## 2021-08-26 DIAGNOSIS — S52.102D CLOSED FRACTURE OF PROXIMAL END OF LEFT RADIUS WITH ROUTINE HEALING, UNSPECIFIED FRACTURE MORPHOLOGY, SUBSEQUENT ENCOUNTER: ICD-10-CM

## 2021-08-26 PROCEDURE — G0439 PPPS, SUBSEQ VISIT: HCPCS | Performed by: NURSE PRACTITIONER

## 2021-08-26 RX ORDER — ATORVASTATIN CALCIUM 10 MG/1
10 TABLET, FILM COATED ORAL
Qty: 36 TABLET | Refills: 0 | Status: SHIPPED | OUTPATIENT
Start: 2021-08-26 | End: 2021-09-07 | Stop reason: SDUPTHER

## 2021-08-26 SDOH — ECONOMIC STABILITY: INCOME INSECURITY: IN THE LAST 12 MONTHS, WAS THERE A TIME WHEN YOU WERE NOT ABLE TO PAY THE MORTGAGE OR RENT ON TIME?: NO

## 2021-08-26 SDOH — ECONOMIC STABILITY: FOOD INSECURITY: WITHIN THE PAST 12 MONTHS, THE FOOD YOU BOUGHT JUST DIDN'T LAST AND YOU DIDN'T HAVE MONEY TO GET MORE.: NEVER TRUE

## 2021-08-26 SDOH — HEALTH STABILITY: PHYSICAL HEALTH: ON AVERAGE, HOW MANY DAYS PER WEEK DO YOU ENGAGE IN MODERATE TO STRENUOUS EXERCISE (LIKE A BRISK WALK)?: 3 DAYS

## 2021-08-26 SDOH — ECONOMIC STABILITY: INCOME INSECURITY: HOW HARD IS IT FOR YOU TO PAY FOR THE VERY BASICS LIKE FOOD, HOUSING, MEDICAL CARE, AND HEATING?: NOT HARD AT ALL

## 2021-08-26 SDOH — ECONOMIC STABILITY: HOUSING INSECURITY
IN THE LAST 12 MONTHS, WAS THERE A TIME WHEN YOU DID NOT HAVE A STEADY PLACE TO SLEEP OR SLEPT IN A SHELTER (INCLUDING NOW)?: NO

## 2021-08-26 SDOH — HEALTH STABILITY: PHYSICAL HEALTH: ON AVERAGE, HOW MANY MINUTES DO YOU ENGAGE IN EXERCISE AT THIS LEVEL?: 60 MIN

## 2021-08-26 SDOH — ECONOMIC STABILITY: FOOD INSECURITY: WITHIN THE PAST 12 MONTHS, YOU WORRIED THAT YOUR FOOD WOULD RUN OUT BEFORE YOU GOT MONEY TO BUY MORE.: NEVER TRUE

## 2021-08-26 SDOH — ECONOMIC STABILITY: TRANSPORTATION INSECURITY
IN THE PAST 12 MONTHS, HAS LACK OF RELIABLE TRANSPORTATION KEPT YOU FROM MEDICAL APPOINTMENTS, MEETINGS, WORK OR FROM GETTING THINGS NEEDED FOR DAILY LIVING?: NO

## 2021-08-26 SDOH — ECONOMIC STABILITY: HOUSING INSECURITY: IN THE LAST 12 MONTHS, HOW MANY PLACES HAVE YOU LIVED?: 1

## 2021-08-26 SDOH — HEALTH STABILITY: MENTAL HEALTH
STRESS IS WHEN SOMEONE FEELS TENSE, NERVOUS, ANXIOUS, OR CAN'T SLEEP AT NIGHT BECAUSE THEIR MIND IS TROUBLED. HOW STRESSED ARE YOU?: ONLY A LITTLE

## 2021-08-26 SDOH — ECONOMIC STABILITY: HOUSING INSECURITY

## 2021-08-26 ASSESSMENT — ENCOUNTER SYMPTOMS: GENERAL WELL-BEING: GOOD

## 2021-08-26 ASSESSMENT — PATIENT HEALTH QUESTIONNAIRE - PHQ9: CLINICAL INTERPRETATION OF PHQ2 SCORE: 0

## 2021-08-26 ASSESSMENT — SOCIAL DETERMINANTS OF HEALTH (SDOH)
IN A TYPICAL WEEK, HOW MANY TIMES DO YOU TALK ON THE PHONE WITH FAMILY, FRIENDS, OR NEIGHBORS?: THREE TIMES A WEEK
HOW OFTEN DO YOU ATTENT MEETINGS OF THE CLUB OR ORGANIZATION YOU BELONG TO?: NEVER
HOW HARD IS IT FOR YOU TO PAY FOR THE VERY BASICS LIKE FOOD, HOUSING, MEDICAL CARE, AND HEATING?: NOT HARD AT ALL
HOW OFTEN DO YOU ATTEND CHURCH OR RELIGIOUS SERVICES?: NEVER
WITHIN THE PAST 12 MONTHS, YOU WORRIED THAT YOUR FOOD WOULD RUN OUT BEFORE YOU GOT THE MONEY TO BUY MORE: NEVER TRUE
HOW OFTEN DO YOU ATTENT MEETINGS OF THE CLUB OR ORGANIZATION YOU BELONG TO?: NEVER
HOW MANY DRINKS CONTAINING ALCOHOL DO YOU HAVE ON A TYPICAL DAY WHEN YOU ARE DRINKING: 1 OR 2
DO YOU BELONG TO ANY CLUBS OR ORGANIZATIONS SUCH AS CHURCH GROUPS UNIONS, FRATERNAL OR ATHLETIC GROUPS, OR SCHOOL GROUPS?: NO
HOW OFTEN DO YOU GET TOGETHER WITH FRIENDS OR RELATIVES?: THREE TIMES A WEEK
IN A TYPICAL WEEK, HOW MANY TIMES DO YOU TALK ON THE PHONE WITH FAMILY, FRIENDS, OR NEIGHBORS?: THREE TIMES A WEEK
DO YOU BELONG TO ANY CLUBS OR ORGANIZATIONS SUCH AS CHURCH GROUPS UNIONS, FRATERNAL OR ATHLETIC GROUPS, OR SCHOOL GROUPS?: NO
HOW OFTEN DO YOU HAVE SIX OR MORE DRINKS ON ONE OCCASION: NEVER
HOW OFTEN DO YOU ATTEND CHURCH OR RELIGIOUS SERVICES?: NEVER
HOW OFTEN DO YOU HAVE A DRINK CONTAINING ALCOHOL: MONTHLY OR LESS
HOW OFTEN DO YOU GET TOGETHER WITH FRIENDS OR RELATIVES?: THREE TIMES A WEEK

## 2021-08-26 ASSESSMENT — LIFESTYLE VARIABLES
HOW OFTEN DO YOU HAVE SIX OR MORE DRINKS ON ONE OCCASION: NEVER
HOW OFTEN DO YOU HAVE A DRINK CONTAINING ALCOHOL: MONTHLY OR LESS
HOW MANY STANDARD DRINKS CONTAINING ALCOHOL DO YOU HAVE ON A TYPICAL DAY: 1 OR 2

## 2021-08-26 ASSESSMENT — ACTIVITIES OF DAILY LIVING (ADL): BATHING_REQUIRES_ASSISTANCE: 0

## 2021-08-26 NOTE — ASSESSMENT & PLAN NOTE
Stable and well controlled on lipitor.  Needs refill.  Needs updated lab.  Last lab in 7/20 was all at goal

## 2021-08-26 NOTE — ASSESSMENT & PLAN NOTE
She doesn't know where it is.  She thought that it was in her brain however last MRI brain was wnl.  No sx or tx needed.

## 2021-08-26 NOTE — ASSESSMENT & PLAN NOTE
Stable on CPAP.  Followed by pulm.  She is having some difficulty with sleeping.  She has a old dog that has dementia and vision impaired. Using otc meds

## 2021-08-26 NOTE — PROGRESS NOTES
HPI:  Hallie is a 76 y.o. here for Medicare Annual Wellness Visit  She has a hx of left knee injury with medial meniscus injury in 2009.  Now she is playing tennis w/o issues.  Running while playing w/o issues.  She is also doing other exercises and yoga.  Yoga causes the pain that is in the left groin.  She doesn't know if the groin pain is d/t the knee.  Sx started about 6 mo ago.  Worst sx are in am with getting up.  Left knee if very stiff.  That is when the left groin pain occurs also.    She has a hx of dense breasts.  Will be due mammoo in sept.  Will do sonocine instread.    MONIK (obstructive sleep apnea)  Stable on CPAP.  Followed by pulm.  She is having some difficulty with sleeping.  She has a old dog that has dementia and vision impaired. Using otc meds    Closed fracture of left radius with routine healing  Resolved and healed    Torn rotator cuff  Resolved and healed    BPPV (benign paroxysmal positional vertigo)  Occurs only occas.  She uses epley maneuver and meclizine but no reoccurrence in years.    Hyperlipidemia LDL goal <100  Stable and well controlled on lipitor.  Needs refill.  Needs updated lab.  Last lab in 7/20 was all at goal    History of meningioma  She doesn't know where it is.  She thought that it was in her brain however last MRI brain was wnl.  No sx or tx needed.        MONIK (obstructive sleep apnea)  Stable on CPAP.  Followed by pulm.  She is having some difficulty with sleeping.  She has a old dog that has dementia and vision impaired. Using otc meds    Closed fracture of left radius with routine healing  Resolved and healed    Torn rotator cuff  Resolved and healed    BPPV (benign paroxysmal positional vertigo)  Occurs only occas.  She uses epley maneuver and meclizine but no reoccurrence in years.    Hyperlipidemia LDL goal <100  Stable and well controlled on lipitor.  Needs refill.  Needs updated lab.  Last lab in 7/20 was all at goal    History of meningioma  She doesn't  know where it is.  She thought that it was in her brain however last MRI brain was wnl.  No sx or tx needed.        Patient Active Problem List    Diagnosis Date Noted   • History of meningioma 11/26/2019   • Anesthesia    • Hyperlipidemia LDL goal <100 03/08/2017   • Torn rotator cuff    • BPPV (benign paroxysmal positional vertigo)    • Closed fracture of left radius with routine healing 09/04/2013   • MONIK (obstructive sleep apnea) 08/19/2013       Current Outpatient Medications   Medication Sig Dispense Refill   • atorvastatin (LIPITOR) 10 MG Tab Take 1 Tablet by mouth every 48 hours. 36 Tablet 0   • Multiple Vitamins-Minerals (CENTRUM SILVER) TABS Take  by mouth every day.     • Magnesium 400 MG CAPS Take  by mouth every 48 hours as needed.     • Calcium Carbonate-Vitamin D (CALCIUM 600 + D PO) Take  by mouth every day.     • Cholecalciferol (VITAMIN D) 2000 UNITS CAPS Take  by mouth every day.       No current facility-administered medications for this visit.        Patient is taking medications as noted in medication list.  Current supplements as per medication list.     Allergies: Penicillins    Current social contact/activities:  Tennis, get togethers, family get togethers,hang out co-workers.    Is patient current with immunizations? Yes.    She  reports that she has never smoked. She has never used smokeless tobacco. She reports current alcohol use. She reports that she does not use drugs.  Counseling given: Not Answered        DPA/Advanced directive: Patient has Advanced Directive, Living Will and Durable Power of , but it is not on file. Instructed to bring in a copy to scan into their chart.    ROS:    Gait: Uses no assistive device   Ostomy: No   Other tubes: No   Amputations: No   Chronic oxygen use Yes   Last eye exam  3+ years ago  Wears hearing aids: No   : Denies any urinary leakage during the last 6 months  Review of Systems   Constitutional: Negative.  Negative for fever, chills,  weight loss, malaise/fatigue and diaphoresis.   HENT: Negative.  Negative for hearing loss, ear pain, nosebleeds, congestion, sore throat, neck pain, tinnitus and ear discharge.    Eyes: Negative.  Negative for blurred vision, double vision, photophobia, pain, discharge and redness.   Respiratory: Negative.  Negative for cough, hemoptysis, sputum production, shortness of breath, wheezing and stridor.    Cardiovascular: Negative.  Negative for chest pain, palpitations, orthopnea, claudication, leg swelling and PND.   Gastrointestinal: Negative.  Negative for heartburn, nausea, vomiting, abdominal pain, diarrhea, constipation, blood in stool and melena.   Genitourinary: Negative.  Negative for dysuria, urgency, frequency, incontinence, hematuria and flank pain.   Musculoskeletal: Negative.  Negative for myalgias, back pain, falls.   Skin: Negative.  Negative for itching and rash. followed by derm.  Neurological: Negative.  Negative for dizziness, tingling, tremors, sensory change, speech change, focal weakness, seizures, loss of consciousness, weakness and headaches.   Endo/Heme/Allergies: Negative.  Negative for environmental allergies and polydipsia. Does bruise/bleed easily.   Psychiatric/Behavioral: Negative.  Negative for depression, suicidal ideas, hallucinations, memory loss and substance abuse. The patient is not nervous/anxious and does not have insomnia.    All other systems reviewed and are negative.      Screening:    yes    Depression Screening    Little interest or pleasure in doing things?  0 - not at all  Feeling down, depressed, or hopeless? 0 - not at all  Patient Health Questionnaire Score: 0    If depressive symptoms identified deferred to follow up visit unless specifically addressed in assessment and plan.    Interpretation of PHQ-9 Total Score   Score Severity   1-4 No Depression   5-9 Mild Depression   10-14 Moderate Depression   15-19 Moderately Severe Depression   20-27 Severe  Depression    Screening for Cognitive Impairment    Three Minute Recall (captain, garden, picture)  3/3    Tristin clock face with all 12 numbers and set the hands to show 5 past 8.  Yes    If cognitive concerns identified, deferred for follow up unless specifically addressed in assessment and plan.    Fall Risk Assessment    Has the patient had two or more falls in the last year or any fall with injury in the last year?  No  If fall risk identified, deferred for follow up unless specifically addressed in assessment and plan.    Safety Assessment    Throw rugs on floor.  Yes  Handrails on all stairs.  No  Good lighting in all hallways.  Yes  Difficulty hearing.  No  Patient counseled about all safety risks that were identified.    Functional Assessment ADLs    Are there any barriers preventing you from cooking for yourself or meeting nutritional needs?  No.    Are there any barriers preventing you from driving safely or obtaining transportation?  No.    Are there any barriers preventing you from using a telephone or calling for help?  No.    Are there any barriers preventing you from shopping?  No.    Are there any barriers preventing you from taking care of your own finances?  No.    Are there any barriers preventing you from managing your medications?  No.    Are there any barriers preventing you from showering, bathing or dressing yourself?  No.    Are you currently engaging in any exercise or physical activity?  Yes.  Tennis 3x a week,senior fitness, yoga  What is your perception of your health?  Good.    Health Maintenance Summary                HEPATITIS C SCREENING Overdue 1945     IMM ZOSTER VACCINES Overdue 7/18/2014      Done 5/23/2014 Imm Admin: Zoster Vaccine Live (ZVL) (Zostavax) - HISTORICAL DATA    COLORECTAL CANCER SCREENING Overdue 7/13/2019     MAMMOGRAM Next Due 9/2/2021      Done 9/2/2020 MA-SCREENING MAMMO BILAT W/TOMOSYNTHESIS W/CAD     Patient has more history with this topic...    IMM  INFLUENZA Next Due 9/1/2021      Done 9/9/2020 Imm Admin: Influenza Vaccine Adult HD     Patient has more history with this topic...    Annual Wellness Visit Next Due 8/27/2022      Done 8/26/2021 SUBSEQUENT ANNUAL WELLNESS VISIT-INCLUDES PPPS ()     Patient has more history with this topic...    IMM DTaP/Tdap/Td Vaccine Next Due 8/19/2023      Done 8/19/2013 Imm Admin: Tdap Vaccine    BONE DENSITY Next Due 7/28/2025      Done 7/28/2020 DS-BONE DENSITY STUDY (DEXA)     Patient has more history with this topic...          Patient Care Team:  BLAKE Cadena as PCP - General (Family Medicine)  [facility] as Home Health Provider (DME Supplier)  Katherine Stern as [facility]     Social History     Tobacco Use   • Smoking status: Never Smoker   • Smokeless tobacco: Never Used   Vaping Use   • Vaping Use: Never used   Substance Use Topics   • Alcohol use: Yes     Comment: one per week or once a month    • Drug use: No     Family History   Problem Relation Age of Onset   • Lung Disease Mother         emphysema   • Alcohol/Drug Mother    • Alcohol/Drug Father    • Stroke Brother      She  has a past medical history of Anesthesia, BPPV (benign paroxysmal positional vertigo), Closed fracture of radius, History of meningioma (11/26/2019), Hyperlipidemia, Sleep apnea, and Torn rotator cuff.   Past Surgical History:   Procedure Laterality Date   • SHOULDER DECOMPRESSION ARTHROSCOPIC Right 3/1/2016    Procedure: SHOULDER DECOMPRESSION ARTHROSCOPIC - SUBACROMIAL, LABRAL DEBRIDMENT;  Surgeon: Shama Nelson M.D.;  Location: [facility];  Service:    • SHOULDER ARTHROSCOPY W/ ROTATOR CUFF REPAIR Right 3/1/2016    Procedure: SHOULDER ARTHROSCOPY W/ ROTATOR CUFF REPAIR;  Surgeon: Shama Nelson M.D.;  Location: [facility];  Service:    • WRIST ORIF  9/4/2013    Performed by Prasanna Anthony M.D. at [facility]   • CARPAL TUNNEL RELEASE  9/4/2013  "   Performed by Prasanna Anthony M.D. at SURGERY [facility]S   • KNEE ARTHROSCOPY Left 2009     - Dr. Newman; left   • LUMPECTOMY Right 1970's    right breast cyst removal   • TONSILLECTOMY  as child           Exam:     /72 (BP Location: Right arm, Patient Position: Sitting)   Pulse 79   Temp 36.3 °C (97.3 °F) (Temporal)   Ht 1.575 m (5' 2\")   Wt 55.9 kg (123 lb 3.2 oz)   SpO2 96%  Body mass index is 22.53 kg/m².    Hearing good.    Dentition None  Alert, oriented in no acute distress.  Eye contact is good, speech goal directed, affect calm  Physical Exam   Vitals reviewed.  Constitutional: oriented to person, place, and time. appears well-developed and well-nourished. No distress.   HENT: Head: Normocephalic and atraumatic. Bilateral tympanic membranes wnl w/o bulging.  Right Ear: External ear normal. Left Ear: External ear normal. Nose: Nose normal.  Mouth/Throat: Oropharynx is clear and moist. No oropharyngeal exudate. nedra tm wnl. Eyes: Conjunctivae and EOM are normal. Pupils are equal, round, and reactive to light. Right eye exhibits no discharge. Left eye exhibits no discharge. No scleral icterus.    Neck: Normal range of motion. Neck supple. No JVD present.   Cardiovascular: Normal rate, regular rhythm, normal heart sounds and intact distal pulses.  Exam reveals no gallop and no friction rub.  No murmur heard.  No carotid bruits   Pulmonary/Chest: Effort normal and breath sounds normal. No stridor. No respiratory distress. no wheezes or rales. exhibits no tenderness.   Abdominal: Soft. Bowel sounds are normal. exhibits no distension and no mass. No tenderness. no rebound and no guarding.   Musculoskeletal: Normal range of motion. exhibits no edema or tenderness.  nedra pedal pulses 2+.  Lymphadenopathy:  no cervical or supraclavicular adenopathy.   Neurological: alert and oriented to person, place, and time. has normal reflexes. displays normal reflexes. No cranial nerve deficit. exhibits normal " muscle tone. Coordination normal.   Skin: Skin is warm and dry. No rash noted. no diaphoresis. No erythema. No pallor.   Psychiatric: normal mood and affect. behavior is normal.         Assessment and Plan. The following treatment and monitoring plan is recommended:    1. Hyperlipidemia LDL goal <100  atorvastatin (LIPITOR) 10 MG Tab    Subsequent Annual Wellness Visit - Includes PPPS ()    stable on meds.  refilled meds.  due for updated lab.  do lab and f/u w/pt w/results.  f/u yearly or sooner if lab abn   2. Chronic pain of left knee  DX-KNEE 3 VIEWS LEFT    REFERRAL TO ORTHOPEDICS    Subsequent Annual Wellness Visit - Includes PPPS ()    xray left knee.  f/u w/pt w/results. refer ortho   3. MONIK (obstructive sleep apnea)  Subsequent Annual Wellness Visit - Includes PPPS ()    stable on CPAP.  followed by pulm   4. Dense breasts  Subsequent Annual Wellness Visit - Includes PPPS ()    US-SCREENING WHOLE BREAST BILATERAL (3D SCREENING)    sonocine ordered   5. Closed fracture of proximal end of left radius with routine healing, unspecified fracture morphology, subsequent encounter  Subsequent Annual Wellness Visit - Includes PPPS ()    resolved   6. Traumatic complete tear of right rotator cuff, sequela  Subsequent Annual Wellness Visit - Includes PPPS ()    resolved   7. Benign paroxysmal positional vertigo, unspecified laterality  Subsequent Annual Wellness Visit - Includes PPPS ()    rare sx only   8. History of meningioma  Subsequent Annual Wellness Visit - Includes PPPS ()    no known etiology.  no sx or tx needed.            Services suggested: No services needed at this time  Health Care Screening recommendations as per orders if indicated.  Referrals offered: PT/OT/Nutrition counseling/Behavioral Health/Smoking cessation as per orders if indicated.    Discussion today about general wellness and lifestyle habits:    · Prevent falls and reduce trip hazards; Cautioned  about securing or removing rugs.  · Have a working fire alarm and carbon monoxide detector;   · Engage in regular physical activity and social activities       Follow-up: Return in about 1 year (around 8/26/2022), or unless lab is abn.

## 2021-08-27 ENCOUNTER — TELEPHONE (OUTPATIENT)
Dept: HEALTH INFORMATION MANAGEMENT | Facility: OTHER | Age: 76
End: 2021-08-27

## 2021-08-27 NOTE — TELEPHONE ENCOUNTER
Outcome: Pt called and requested to be transferred to imaging dept. Warm transferred to dept     Please transfer to Patient Outreach Team at [phone] when patient returns call.

## 2021-08-30 ENCOUNTER — HOSPITAL ENCOUNTER (OUTPATIENT)
Dept: RADIOLOGY | Facility: MEDICAL CENTER | Age: 76
End: 2021-08-30
Attending: NURSE PRACTITIONER
Payer: MEDICARE

## 2021-08-30 ENCOUNTER — TELEPHONE (OUTPATIENT)
Dept: MEDICAL GROUP | Facility: MEDICAL CENTER | Age: 76
End: 2021-08-30

## 2021-08-30 DIAGNOSIS — R92.30 DENSE BREASTS: ICD-10-CM

## 2021-08-30 DIAGNOSIS — G89.29 CHRONIC PAIN OF LEFT KNEE: ICD-10-CM

## 2021-08-30 DIAGNOSIS — M25.562 CHRONIC PAIN OF LEFT KNEE: ICD-10-CM

## 2021-08-30 DIAGNOSIS — R92.2 DENSE BREASTS: ICD-10-CM

## 2021-08-30 PROCEDURE — 76641 ULTRASOUND BREAST COMPLETE: CPT

## 2021-08-30 PROCEDURE — 73562 X-RAY EXAM OF KNEE 3: CPT | Mod: LT

## 2021-08-31 NOTE — TELEPHONE ENCOUNTER
Please let pt know that the left knee xray shows severe OA.  She has referral to ortho.  Her sonocine is wnl

## 2021-09-07 DIAGNOSIS — E78.5 HYPERLIPIDEMIA LDL GOAL <100: ICD-10-CM

## 2021-09-07 RX ORDER — ATORVASTATIN CALCIUM 10 MG/1
10 TABLET, FILM COATED ORAL
Qty: 36 TABLET | Refills: 0 | Status: SHIPPED | OUTPATIENT
Start: 2021-09-07 | End: 2021-12-06 | Stop reason: SDUPTHER

## 2021-09-07 NOTE — TELEPHONE ENCOUNTER
Insurance is requesting a 100-day supply.    Received request via: Pharmacy    Was the patient seen in the last year in this department? Yes    Does the patient have an active prescription (recently filled or refills available) for medication(s) requested? Yes. Refill request has been refused in Epic. Contacted pharmacy and called in most recent prescription.

## 2021-09-08 ENCOUNTER — HOSPITAL ENCOUNTER (OUTPATIENT)
Dept: LAB | Facility: MEDICAL CENTER | Age: 76
End: 2021-09-08
Attending: NURSE PRACTITIONER
Payer: MEDICARE

## 2021-09-08 DIAGNOSIS — R41.3 MEMORY LOSS: ICD-10-CM

## 2021-09-08 DIAGNOSIS — E78.5 HYPERLIPIDEMIA LDL GOAL <100: ICD-10-CM

## 2021-09-08 LAB
ALBUMIN SERPL BCP-MCNC: 4.5 G/DL (ref 3.2–4.9)
ALBUMIN/GLOB SERPL: 2.5 G/DL
ALP SERPL-CCNC: 109 U/L (ref 30–99)
ALT SERPL-CCNC: 12 U/L (ref 2–50)
ANION GAP SERPL CALC-SCNC: 8 MMOL/L (ref 7–16)
AST SERPL-CCNC: 20 U/L (ref 12–45)
BILIRUB SERPL-MCNC: 0.3 MG/DL (ref 0.1–1.5)
BUN SERPL-MCNC: 10 MG/DL (ref 8–22)
CALCIUM SERPL-MCNC: 9.4 MG/DL (ref 8.4–10.2)
CHLORIDE SERPL-SCNC: 97 MMOL/L (ref 96–112)
CHOLEST SERPL-MCNC: 149 MG/DL (ref 100–199)
CO2 SERPL-SCNC: 26 MMOL/L (ref 20–33)
CREAT SERPL-MCNC: 0.63 MG/DL (ref 0.5–1.4)
FASTING STATUS PATIENT QL REPORTED: NORMAL
GLOBULIN SER CALC-MCNC: 1.8 G/DL (ref 1.9–3.5)
GLUCOSE SERPL-MCNC: 94 MG/DL (ref 65–99)
HDLC SERPL-MCNC: 64 MG/DL
LDLC SERPL CALC-MCNC: 68 MG/DL
POTASSIUM SERPL-SCNC: 4.3 MMOL/L (ref 3.6–5.5)
PROT SERPL-MCNC: 6.3 G/DL (ref 6–8.2)
SODIUM SERPL-SCNC: 131 MMOL/L (ref 135–145)
TRIGL SERPL-MCNC: 85 MG/DL (ref 0–149)

## 2021-09-08 PROCEDURE — 36415 COLL VENOUS BLD VENIPUNCTURE: CPT

## 2021-09-08 PROCEDURE — 80061 LIPID PANEL: CPT

## 2021-09-08 PROCEDURE — 80053 COMPREHEN METABOLIC PANEL: CPT

## 2021-10-19 ENCOUNTER — TELEPHONE (OUTPATIENT)
Dept: HEALTH INFORMATION MANAGEMENT | Facility: OTHER | Age: 76
End: 2021-10-19

## 2021-10-26 ENCOUNTER — APPOINTMENT (OUTPATIENT)
Dept: MEDICAL GROUP | Facility: MEDICAL CENTER | Age: 76
End: 2021-10-26
Payer: MEDICARE

## 2021-10-26 PROBLEM — R10.32 LEFT GROIN PAIN: Status: ACTIVE | Noted: 2021-10-26

## 2021-10-27 ENCOUNTER — HOSPITAL ENCOUNTER (OUTPATIENT)
Dept: RADIOLOGY | Facility: MEDICAL CENTER | Age: 76
End: 2021-10-27
Attending: NURSE PRACTITIONER
Payer: MEDICARE

## 2021-10-27 ENCOUNTER — HOSPITAL ENCOUNTER (OUTPATIENT)
Dept: RADIOLOGY | Facility: MEDICAL CENTER | Age: 76
End: 2021-10-27
Attending: STUDENT IN AN ORGANIZED HEALTH CARE EDUCATION/TRAINING PROGRAM
Payer: MEDICARE

## 2021-10-27 DIAGNOSIS — S70.01XA HIP HEMATOMA, RIGHT, INITIAL ENCOUNTER: ICD-10-CM

## 2021-10-27 DIAGNOSIS — R10.32 LEFT GROIN PAIN: ICD-10-CM
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
## 2021-10-27 PROBLEM — M16.9 OSTEOARTHRITIS OF HIP: Status: ACTIVE | Noted: 2021-10-26

## 2021-10-27 PROBLEM — M17.9 OSTEOARTHRITIS OF KNEE: Status: ACTIVE | Noted: 2021-09-09

## 2021-10-27 PROCEDURE — 73521 X-RAY EXAM HIPS BI 2 VIEWS: CPT

## 2021-12-06 DIAGNOSIS — G89.29 CHRONIC LEFT HIP PAIN: ICD-10-CM

## 2021-12-06 DIAGNOSIS — M25.562 CHRONIC PAIN OF LEFT KNEE: ICD-10-CM

## 2021-12-06 DIAGNOSIS — M25.552 CHRONIC LEFT HIP PAIN: ICD-10-CM

## 2021-12-06 DIAGNOSIS — G89.29 CHRONIC PAIN OF LEFT KNEE: ICD-10-CM

## 2022-01-10 ENCOUNTER — TELEMEDICINE (OUTPATIENT)
Dept: SLEEP MEDICINE | Facility: MEDICAL CENTER | Age: 77
End: 2022-01-10
Payer: MEDICARE

## 2022-01-10 VITALS — BODY MASS INDEX: 21.71 KG/M2 | WEIGHT: 118 LBS | HEIGHT: 62 IN

## 2022-01-10 DIAGNOSIS — G47.33 OSA (OBSTRUCTIVE SLEEP APNEA): Chronic | ICD-10-CM

## 2022-01-10 DIAGNOSIS — Z78.9 NONSMOKER: ICD-10-CM

## 2022-01-10 PROCEDURE — 99213 OFFICE O/P EST LOW 20 MIN: CPT | Mod: 95 | Performed by: NURSE PRACTITIONER

## 2022-01-10 ASSESSMENT — PATIENT HEALTH QUESTIONNAIRE - PHQ9: CLINICAL INTERPRETATION OF PHQ2 SCORE: 0

## 2022-01-10 NOTE — PROGRESS NOTES
Virtual Visit: Established Patient   This visit was conducted via Zoom using secure and encrypted videoconferencing technology.   The patient was in a private location in the state of [place].    The patient's identity was confirmed and verbal consent was obtained for this virtual visit.    Subjective:   CC:   Chief Complaint   Patient presents with   • Apnea     Last seen 1/19/2021        Hallie Fisher is a 76 y.o. female presenting for evaluation and management of:    MONIK  Last OV 1/19/21    Currently using Respironics auto CPAP 6 to 12 cm; device pain 2018.  Recall discussed;     Compliance report 12/11/2021 through 1/9/2022 indicates 100% compliance, average nightly use 6 hours 5 minutes, mean pressure 7.1 cm, minimal mask leak, overall AHI 5.4/h.  Findings are stable compared to last year's report. She tolerates mask and pressure well; no AM headaches. She notes overall consistent energy level but more sedentary due to hip/knee issues. She does wake at night to take out her elderly dog to go potty. She denies cardiac or respiratory symptoms.    Sleep hx:  Prior PSG indicated severe sleep apnea with an AHI of 41.7/h and a javier of 83%.  Patient currently uses auto CPAP 6 to 12 cm nightly.    ROS   In HPI; otherwise negative     Current medicines (including changes today)  Current Outpatient Medications   Medication Sig Dispense Refill   • polymixin-trimethoprim (POLYTRIM) 01293-0.1 UNIT/ML-% Solution polymyxin B sulfate 10,000 unit-trimethoprim 1 mg/mL eye drops   ADMINISTER 1 DROP INTO THE LEFT EYE EVERY 4 HOURS FOR 5 DAYS.     • amoxicillin (AMOXIL) 500 MG Cap amoxicillin 500 mg capsule   TAKE 1 CAPSULE BY MOUTH 4 TIMES A DAY UNTIL GONE     • meloxicam (MOBIC) 7.5 MG Tab Take 1 Tablet by mouth every day. 30 Tablet 0   • atorvastatin (LIPITOR) 10 MG Tab Take 1 Tablet by mouth every 48 hours. 36 Tablet 3   • Multiple Vitamins-Minerals (CENTRUM SILVER) TABS Take  by mouth every day.     • Magnesium 400 MG CAPS Take  " by mouth every 48 hours as needed.     • Calcium Carbonate-Vitamin D (CALCIUM 600 + D PO) Take  by mouth every day.     • Cholecalciferol (VITAMIN D) 2000 UNITS CAPS Take  by mouth every day.       No current facility-administered medications for this visit.       Patient Active Problem List    Diagnosis Date Noted   • Osteoarthritis of hip 10/26/2021   • Osteoarthritis of knee 09/09/2021   • History of meningioma 11/26/2019   • Anesthesia    • Hyperlipidemia LDL goal <100 03/08/2017   • Torn rotator cuff    • BPPV (benign paroxysmal positional vertigo)    • Closed fracture of left radius with routine healing 09/04/2013   • MONIK (obstructive sleep apnea) 08/19/2013        Objective:   Ht 1.575 m (5' 2\")   Wt 53.5 kg (118 lb)   BMI 21.58 kg/m²     Physical Exam:  Constitutional: Alert, no distress, well-groomed.  Skin: No rashes in visible areas.  Eye: Round. Conjunctiva clear, lids normal. No icterus.   ENMT: Lips pink without lesions, good dentition, moist mucous membranes. Phonation normal.  Neck: No masses, no thyromegaly. Moves freely without pain.  Respiratory: Unlabored respiratory effort, no cough or audible wheeze  Psych: Alert and oriented x3, normal affect and mood.     Assessment and Plan:   The following treatment plan was discussed:     1. MONIK (obstructive sleep apnea)    2. BMI 21.0-21.9, adult    3. Nonsmoker    Continue CPAP nightly.   DME mask/supplies  Discussed sleep hygiene  F/u with PCP for other health concerns    Follow-up: 1 year, sooner if needed.           "

## 2022-02-23 DIAGNOSIS — E78.5 HYPERLIPIDEMIA LDL GOAL <100: ICD-10-CM

## 2022-02-23 NOTE — TELEPHONE ENCOUNTER
Insurance is requesting a 100-day supply.    Received request via: Patient    Was the patient seen in the last year in this department? Yes    Does the patient have an active prescription (recently filled or refills available) for medication(s) requested? No

## 2022-02-24 RX ORDER — ATORVASTATIN CALCIUM 10 MG/1
10 TABLET, FILM COATED ORAL
Qty: 36 TABLET | Refills: 2 | Status: SHIPPED | OUTPATIENT
Start: 2022-02-24 | End: 2022-08-29 | Stop reason: SDUPTHER

## 2022-03-05 ENCOUNTER — PATIENT MESSAGE (OUTPATIENT)
Dept: HEALTH INFORMATION MANAGEMENT | Facility: OTHER | Age: 77
End: 2022-03-05
Payer: MEDICARE

## 2022-06-15 ENCOUNTER — TELEPHONE (OUTPATIENT)
Dept: HEALTH INFORMATION MANAGEMENT | Facility: OTHER | Age: 77
End: 2022-06-15

## 2022-08-26 SDOH — HEALTH STABILITY: PHYSICAL HEALTH: ON AVERAGE, HOW MANY MINUTES DO YOU ENGAGE IN EXERCISE AT THIS LEVEL?: 30 MIN

## 2022-08-26 SDOH — ECONOMIC STABILITY: HOUSING INSECURITY: IN THE LAST 12 MONTHS, HOW MANY PLACES HAVE YOU LIVED?: 1

## 2022-08-26 SDOH — ECONOMIC STABILITY: FOOD INSECURITY: WITHIN THE PAST 12 MONTHS, YOU WORRIED THAT YOUR FOOD WOULD RUN OUT BEFORE YOU GOT MONEY TO BUY MORE.: NEVER TRUE

## 2022-08-26 SDOH — ECONOMIC STABILITY: INCOME INSECURITY: IN THE LAST 12 MONTHS, WAS THERE A TIME WHEN YOU WERE NOT ABLE TO PAY THE MORTGAGE OR RENT ON TIME?: NO

## 2022-08-26 SDOH — HEALTH STABILITY: MENTAL HEALTH
STRESS IS WHEN SOMEONE FEELS TENSE, NERVOUS, ANXIOUS, OR CAN'T SLEEP AT NIGHT BECAUSE THEIR MIND IS TROUBLED. HOW STRESSED ARE YOU?: TO SOME EXTENT

## 2022-08-26 SDOH — ECONOMIC STABILITY: FOOD INSECURITY: WITHIN THE PAST 12 MONTHS, THE FOOD YOU BOUGHT JUST DIDN'T LAST AND YOU DIDN'T HAVE MONEY TO GET MORE.: NEVER TRUE

## 2022-08-26 SDOH — HEALTH STABILITY: PHYSICAL HEALTH: ON AVERAGE, HOW MANY DAYS PER WEEK DO YOU ENGAGE IN MODERATE TO STRENUOUS EXERCISE (LIKE A BRISK WALK)?: 3 DAYS

## 2022-08-26 SDOH — ECONOMIC STABILITY: INCOME INSECURITY: HOW HARD IS IT FOR YOU TO PAY FOR THE VERY BASICS LIKE FOOD, HOUSING, MEDICAL CARE, AND HEATING?: NOT HARD AT ALL

## 2022-08-26 ASSESSMENT — SOCIAL DETERMINANTS OF HEALTH (SDOH)
HOW OFTEN DO YOU ATTEND CHURCH OR RELIGIOUS SERVICES?: NEVER
HOW OFTEN DO YOU ATTENT MEETINGS OF THE CLUB OR ORGANIZATION YOU BELONG TO?: NEVER
HOW OFTEN DO YOU GET TOGETHER WITH FRIENDS OR RELATIVES?: TWICE A WEEK
IN A TYPICAL WEEK, HOW MANY TIMES DO YOU TALK ON THE PHONE WITH FAMILY, FRIENDS, OR NEIGHBORS?: TWICE A WEEK
HOW OFTEN DO YOU GET TOGETHER WITH FRIENDS OR RELATIVES?: TWICE A WEEK
HOW OFTEN DO YOU HAVE SIX OR MORE DRINKS ON ONE OCCASION: NEVER
IN A TYPICAL WEEK, HOW MANY TIMES DO YOU TALK ON THE PHONE WITH FAMILY, FRIENDS, OR NEIGHBORS?: TWICE A WEEK
DO YOU BELONG TO ANY CLUBS OR ORGANIZATIONS SUCH AS CHURCH GROUPS UNIONS, FRATERNAL OR ATHLETIC GROUPS, OR SCHOOL GROUPS?: NO
HOW OFTEN DO YOU HAVE A DRINK CONTAINING ALCOHOL: MONTHLY OR LESS
HOW OFTEN DO YOU ATTEND CHURCH OR RELIGIOUS SERVICES?: NEVER
DO YOU BELONG TO ANY CLUBS OR ORGANIZATIONS SUCH AS CHURCH GROUPS UNIONS, FRATERNAL OR ATHLETIC GROUPS, OR SCHOOL GROUPS?: NO
HOW OFTEN DO YOU ATTENT MEETINGS OF THE CLUB OR ORGANIZATION YOU BELONG TO?: NEVER
WITHIN THE PAST 12 MONTHS, YOU WORRIED THAT YOUR FOOD WOULD RUN OUT BEFORE YOU GOT THE MONEY TO BUY MORE: NEVER TRUE
HOW HARD IS IT FOR YOU TO PAY FOR THE VERY BASICS LIKE FOOD, HOUSING, MEDICAL CARE, AND HEATING?: NOT HARD AT ALL
HOW MANY DRINKS CONTAINING ALCOHOL DO YOU HAVE ON A TYPICAL DAY WHEN YOU ARE DRINKING: 1 OR 2

## 2022-08-26 ASSESSMENT — LIFESTYLE VARIABLES
HOW MANY STANDARD DRINKS CONTAINING ALCOHOL DO YOU HAVE ON A TYPICAL DAY: 1 OR 2
AUDIT-C TOTAL SCORE: 1
HOW OFTEN DO YOU HAVE SIX OR MORE DRINKS ON ONE OCCASION: NEVER
SKIP TO QUESTIONS 9-10: 1
HOW OFTEN DO YOU HAVE A DRINK CONTAINING ALCOHOL: MONTHLY OR LESS

## 2022-08-29 ENCOUNTER — OFFICE VISIT (OUTPATIENT)
Dept: MEDICAL GROUP | Facility: MEDICAL CENTER | Age: 77
End: 2022-08-29
Payer: MEDICARE

## 2022-08-29 VITALS
TEMPERATURE: 98.5 F | WEIGHT: 122 LBS | HEIGHT: 61 IN | HEART RATE: 81 BPM | DIASTOLIC BLOOD PRESSURE: 56 MMHG | SYSTOLIC BLOOD PRESSURE: 130 MMHG | OXYGEN SATURATION: 94 % | BODY MASS INDEX: 23.03 KG/M2

## 2022-08-29 DIAGNOSIS — M15.9 PRIMARY OSTEOARTHRITIS INVOLVING MULTIPLE JOINTS: ICD-10-CM

## 2022-08-29 DIAGNOSIS — H81.10 BENIGN PAROXYSMAL POSITIONAL VERTIGO, UNSPECIFIED LATERALITY: ICD-10-CM

## 2022-08-29 DIAGNOSIS — D50.9 IRON DEFICIENCY ANEMIA, UNSPECIFIED IRON DEFICIENCY ANEMIA TYPE: ICD-10-CM

## 2022-08-29 DIAGNOSIS — Z12.31 ENCOUNTER FOR SCREENING MAMMOGRAM FOR MALIGNANT NEOPLASM OF BREAST: ICD-10-CM

## 2022-08-29 DIAGNOSIS — Z12.11 SCREENING FOR MALIGNANT NEOPLASM OF COLON: ICD-10-CM

## 2022-08-29 DIAGNOSIS — R92.2 DENSE BREAST: ICD-10-CM

## 2022-08-29 DIAGNOSIS — G47.33 OSA (OBSTRUCTIVE SLEEP APNEA): Chronic | ICD-10-CM

## 2022-08-29 DIAGNOSIS — R92.30 DENSE BREAST: ICD-10-CM

## 2022-08-29 DIAGNOSIS — Z86.018 HISTORY OF MENINGIOMA: ICD-10-CM

## 2022-08-29 DIAGNOSIS — S52.102D CLOSED FRACTURE OF PROXIMAL END OF LEFT RADIUS WITH ROUTINE HEALING, UNSPECIFIED FRACTURE MORPHOLOGY, SUBSEQUENT ENCOUNTER: ICD-10-CM

## 2022-08-29 DIAGNOSIS — E78.5 HYPERLIPIDEMIA LDL GOAL <100: ICD-10-CM

## 2022-08-29 DIAGNOSIS — N95.9 POST MENOPAUSAL PROBLEMS: ICD-10-CM

## 2022-08-29 DIAGNOSIS — F41.9 ANXIETY: ICD-10-CM

## 2022-08-29 PROCEDURE — 99214 OFFICE O/P EST MOD 30 MIN: CPT | Performed by: NURSE PRACTITIONER

## 2022-08-29 RX ORDER — ATORVASTATIN CALCIUM 10 MG/1
10 TABLET, FILM COATED ORAL
Qty: 45 TABLET | Refills: 3 | Status: SHIPPED | OUTPATIENT
Start: 2022-08-29 | End: 2023-10-08

## 2022-08-29 RX ORDER — DIAZEPAM 5 MG/1
5 TABLET ORAL EVERY 6 HOURS PRN
Qty: 10 TABLET | Refills: 2 | Status: SHIPPED | OUTPATIENT
Start: 2022-08-29 | End: 2022-09-28

## 2022-08-29 ASSESSMENT — ENCOUNTER SYMPTOMS: GENERAL WELL-BEING: EXCELLENT

## 2022-08-29 ASSESSMENT — PATIENT HEALTH QUESTIONNAIRE - PHQ9: CLINICAL INTERPRETATION OF PHQ2 SCORE: 0

## 2022-08-29 ASSESSMENT — ACTIVITIES OF DAILY LIVING (ADL): BATHING_REQUIRES_ASSISTANCE: 0

## 2022-08-29 NOTE — NON-PROVIDER
Annual Health Assessment Questions:    1.  Are you currently engaging in any exercise or physical activity? Yes    2.  How would you describe your mood or emotional well-being today? anxious    3.  Have you had any falls in the last year? No    4.  Have you noticed any problems with your balance or had difficulty walking? No     5.  In the last six months have you experienced any leakage of urine? Yes    6. DPA/Advanced Directive: Patient does not have an Advanced Directive.  A packet and workshop information was given on Advanced Directives.

## 2022-08-29 NOTE — ASSESSMENT & PLAN NOTE
occas mild sx only when very stressed.  Uses meclizine.  Used one yesterday but that was 1st one in last 5 yrs.  She is very stressed d/t her ill dig.

## 2022-08-29 NOTE — PROGRESS NOTES
"Subjective     Hallie Fisher is a 77 y.o. female who presents with anxiety          HPI  Seen in f/u for anxiety.     She is very stressed now d/t her old dog needing much healthcare.  Will give valium for the stress.  Will use only occas.  Dog is 16 yrs old, blind and deaf.  Prob has dementia.  It is requiring them to be with the dog all the time.  She is tearful in the office.  Doesn't know when to put the dog to sleep. Vet told them \"they  would know.\"  She doesn't feel that she is ready even though it is causing her lots of stress.  She uses a valium rarely to control her anxiety.    She is due mammo but has dens ebreasts so will do sonocine instead.  Will also do dexa scan to monitor for osteoporosis.  She is on fe after her hip replacement d/t the anemia from surgery.  CBC and iron studies not rechecked recently.  No sx of bleeding.  The surgery was done in may.      Osteoarthritis of multiple joints  Left knee pain.  Plays "[employer] ball.  Pain is occas.  She is s/p left THR.      History of meningioma  Dx over 10 yrs ago.  No sx.  No tx at this time    Hyperlipidemia LDL goal <100  She is stable on lipitor.  Needs refill.  Eating healthy diet.  Exercises regularly    BPPV (benign paroxysmal positional vertigo)  occas mild sx only when very stressed.  Uses meclizine.  Used one yesterday but that was 1st one in last 5 yrs.  She is very stressed d/t her ill dig.    MONIK (obstructive sleep apnea)  Stable and well controlled on CPAP.  Followed by pulm    Closed fracture of left radius with routine healing  Has surgery with a plate.  Occurred long ago  Patient Active Problem List   Diagnosis    MONIK (obstructive sleep apnea)    Closed fracture of left radius with routine healing    Torn rotator cuff    BPPV (benign paroxysmal positional vertigo)    Hyperlipidemia LDL goal <100    Anesthesia    History of meningioma    Osteoarthritis of multiple joints     Current Outpatient Medications   Medication Sig Dispense Refill "    atorvastatin (LIPITOR) 10 MG Tab Take 1 Tablet by mouth every 48 hours. 45 Tablet 3    diazePAM (VALIUM) 5 MG Tab Take 1 Tablet by mouth every 6 hours as needed for Anxiety for up to 30 days. 10 Tablet 2    Multiple Vitamins-Minerals (CENTRUM SILVER) TABS Take  by mouth every day.      Magnesium 400 MG CAPS Take  by mouth every 48 hours as needed.      Calcium Carbonate-Vitamin D (CALCIUM 600 + D PO) Take  by mouth every day.      Cholecalciferol (VITAMIN D) 2000 UNITS CAPS Take  by mouth every day.       No current facility-administered medications for this visit.     Penicillins    ROS  Review of Systems   Constitutional: Negative.  Negative for fever, chills, weight loss, malaise/fatigue and diaphoresis.   HENT: Negative.  Negative for hearing loss, ear pain, nosebleeds, congestion, sore throat, neck pain, tinnitus and ear discharge.    Eyes: Negative.  Negative for blurred vision, double vision, photophobia, pain, discharge and redness.   Respiratory: Negative.  Negative for cough, hemoptysis, sputum production, shortness of breath, wheezing and stridor.    Cardiovascular: Negative.  Negative for chest pain, palpitations, orthopnea, claudication, leg swelling and PND.   Gastrointestinal: Negative.  Negative for heartburn, nausea, vomiting, abdominal pain, diarrhea, constipation, blood in stool and melena.   Genitourinary: Negative.  Negative for dysuria, urgency, frequency, incontinence, hematuria and flank pain.   Musculoskeletal: Negative.  Negative for myalgias, back pain, joint pain and falls.   Skin: Negative.  Negative for itching and rash.   Neurological: Negative.  Negative for dizziness, tingling, tremors, sensory change, speech change, focal weakness, seizures, loss of consciousness, weakness and headaches.   Endo/Heme/Allergies: Negative.  Negative for environmental allergies and polydipsia. Does not bruise/bleed easily.   Psychiatric/Behavioral: Negative.  Negative for depression, suicidal ideas,  "hallucinations, memory loss and substance abuse. The patient does not have insomnia.    All other systems reviewed and are negative.      Objective     /56 (BP Location: Right arm, Patient Position: Sitting, BP Cuff Size: Adult)   Pulse 81   Temp 36.9 °C (98.5 °F)   Ht 1.549 m (5' 1\")   Wt 55.3 kg (122 lb)   SpO2 94%   BMI 23.05 kg/m²      Physical Exam  Physical Exam   Vitals reviewed.  Constitutional: oriented to person, place, and time. appears well-developed and well-nourished. No distress.   HENT: Head: Normocephalic and atraumatic. Bilateral tympanic membranes wnl w/o bulging.  Right Ear: External ear normal. Left Ear: External ear normal. Nose: Nose normal.  Mouth/Throat: Oropharynx is clear and moist. No oropharyngeal exudate. nedra tm wnl. Eyes: Conjunctivae and EOM are normal. Pupils are equal, round, and reactive to light. Right eye exhibits no discharge. Left eye exhibits no discharge. No scleral icterus.    Neck: Normal range of motion. Neck supple. No JVD present.   Cardiovascular: Normal rate, regular rhythm, normal heart sounds and intact distal pulses.  Exam reveals no gallop and no friction rub.  No murmur heard.  No carotid bruits   Pulmonary/Chest: Effort normal and breath sounds normal. No stridor. No respiratory distress. no wheezes or rales. exhibits no tenderness.   Abdominal: Soft. Bowel sounds are normal. exhibits no distension and no mass. No tenderness. no rebound and no guarding.   Musculoskeletal: Normal range of motion. exhibits no edema or tenderness.  nedra pedal pulses 2+.  Lymphadenopathy:  no cervical or supraclavicular adenopathy.   Neurological: alert and oriented to person, place, and time. has normal reflexes. displays normal reflexes. No cranial nerve deficit. exhibits normal muscle tone. Coordination normal.   Skin: Skin is warm and dry. No rash noted. no diaphoresis. No erythema. No pallor.   Psychiatric: normal mood and affect. behavior is normal.     Assessment & " Plan        1. Hyperlipidemia LDL goal <100  atorvastatin (LIPITOR) 10 MG Tab    stable and controlled on lipitor.  refilled meds. do lab and f/u yearly.  call for lab slip.        2. Iron deficiency anemia, unspecified iron deficiency anemia type  CBC WITH DIFFERENTIAL    FERRITIN    IRON/TOTAL IRON BIND    had anemia after recent THR.  [facility] lab.  F/U W/pt w/results.        3. Anxiety  diazePAM (VALIUM) 5 MG Tab    very stressed today about her elderly ill dog.  RF valium to use rarely for worsening anxiety      4. Primary osteoarthritis involving multiple joints      only has occas pain that is controlled with otc med      5. History of meningioma      occurred long ago.  no tx or sx currently      6. Benign paroxysmal positional vertigo, unspecified laterality      uses meclizine occas.  only has dizziness when very stressed.        7. MONIK (obstructive sleep apnea)      stable on CPAP.  followed by pulm yearly      8. Dense breast  US-SCREENING WHOLE BREAST BILATERAL (3D SCREENING)    has dense breasts so will do sonocine instead of mammo      9. Closed fracture of proximal end of left radius with routine healing, unspecified fracture morphology, subsequent encounter      resolved.  this occurred years ago      10. Screening for malignant neoplasm of colon  COLOGUARD (FIT DNA)    declines colonoscopy.  will do cologuard      11. Post menopausal problems  DS-BONE DENSITY STUDY (DEXA)    dexa scan ordered      12. Encounter for screening mammogram for malignant neoplasm of breast      do sonocine d/t dense breasts

## 2022-09-07 ENCOUNTER — DOCUMENTATION (OUTPATIENT)
Dept: HEALTH INFORMATION MANAGEMENT | Facility: OTHER | Age: 77
End: 2022-09-07
Payer: MEDICARE

## 2022-09-13 ENCOUNTER — TELEPHONE (OUTPATIENT)
Dept: MEDICAL GROUP | Facility: MEDICAL CENTER | Age: 77
End: 2022-09-13
Payer: MEDICARE

## 2022-09-13 ENCOUNTER — HOSPITAL ENCOUNTER (OUTPATIENT)
Dept: LAB | Facility: MEDICAL CENTER | Age: 77
End: 2022-09-13
Attending: NURSE PRACTITIONER
Payer: MEDICARE

## 2022-09-13 DIAGNOSIS — E78.5 HYPERLIPIDEMIA LDL GOAL <100: ICD-10-CM

## 2022-09-13 LAB
ALBUMIN SERPL BCP-MCNC: 4.3 G/DL (ref 3.2–4.9)
ALBUMIN/GLOB SERPL: 2 G/DL
ALP SERPL-CCNC: 109 U/L (ref 30–99)
ALT SERPL-CCNC: 13 U/L (ref 2–50)
ANION GAP SERPL CALC-SCNC: 10 MMOL/L (ref 7–16)
AST SERPL-CCNC: 14 U/L (ref 12–45)
BILIRUB SERPL-MCNC: 0.4 MG/DL (ref 0.1–1.5)
BUN SERPL-MCNC: 9 MG/DL (ref 8–22)
CALCIUM SERPL-MCNC: 9.2 MG/DL (ref 8.5–10.5)
CHLORIDE SERPL-SCNC: 103 MMOL/L (ref 96–112)
CHOLEST SERPL-MCNC: 180 MG/DL (ref 100–199)
CO2 SERPL-SCNC: 26 MMOL/L (ref 20–33)
CREAT SERPL-MCNC: 0.64 MG/DL (ref 0.5–1.4)
GFR SERPLBLD CREATININE-BSD FMLA CKD-EPI: 92 ML/MIN/1.73 M 2
GLOBULIN SER CALC-MCNC: 2.1 G/DL (ref 1.9–3.5)
GLUCOSE SERPL-MCNC: 96 MG/DL (ref 65–99)
HDLC SERPL-MCNC: 63 MG/DL
LDLC SERPL CALC-MCNC: 92 MG/DL
POTASSIUM SERPL-SCNC: 3.8 MMOL/L (ref 3.6–5.5)
PROT SERPL-MCNC: 6.4 G/DL (ref 6–8.2)
SODIUM SERPL-SCNC: 139 MMOL/L (ref 135–145)
TRIGL SERPL-MCNC: 125 MG/DL (ref 0–149)

## 2022-09-13 PROCEDURE — 80053 COMPREHEN METABOLIC PANEL: CPT

## 2022-09-13 PROCEDURE — 36415 COLL VENOUS BLD VENIPUNCTURE: CPT

## 2022-09-13 PROCEDURE — 80061 LIPID PANEL: CPT

## 2022-10-11 ENCOUNTER — HOSPITAL ENCOUNTER (OUTPATIENT)
Dept: RADIOLOGY | Facility: MEDICAL CENTER | Age: 77
End: 2022-10-11
Attending: NURSE PRACTITIONER
Payer: MEDICARE

## 2022-10-11 DIAGNOSIS — N95.9 POST MENOPAUSAL PROBLEMS: ICD-10-CM

## 2022-10-11 PROCEDURE — 77080 DXA BONE DENSITY AXIAL: CPT

## 2022-10-18 DIAGNOSIS — F41.9 ANXIETY: ICD-10-CM

## 2022-11-08 ENCOUNTER — TELEMEDICINE (OUTPATIENT)
Dept: BEHAVIORAL HEALTH | Facility: CLINIC | Age: 77
End: 2022-11-08
Payer: MEDICARE

## 2022-11-08 DIAGNOSIS — F41.9 ANXIETY: ICD-10-CM

## 2022-11-08 DIAGNOSIS — Z63.4 BEREAVEMENT: ICD-10-CM

## 2022-11-08 PROCEDURE — 90791 PSYCH DIAGNOSTIC EVALUATION: CPT | Performed by: MARRIAGE & FAMILY THERAPIST

## 2022-11-08 SDOH — SOCIAL STABILITY - SOCIAL INSECURITY: DISSAPEARANCE AND DEATH OF FAMILY MEMBER: Z63.4

## 2022-11-08 NOTE — PROGRESS NOTES
[facility]   Initial Assessment    This visit was conducted via Zoom using secure and encrypted videoconferencing technology.  The patient was in a private location in the [facility].  The patient's identity was confirmed and verbal consent was obtained for this virtual visit.      Name: Hallie Fisher  MRN: [id number]  : 1945  Age: 77 y.o.  Date of assessment: 2022  PCP: BLAKE Cadena  Persons in attendance: Patient  Total session time: 55 minutes      CHIEF COMPLAINT AND HISTORY OF PRESENTING PROBLEM:  (as stated by Patient):  Hallie Fisher is a 77 y.o., White female referred for assessment by No ref. provider found.  Primary presenting issue includes   Chief Complaint   Patient presents with    Initial  Evaluation    Anxiety     . Generally optimistic but getting a flushed feeling, paranoia, started 3 years ago when decided to put dog down. Grief. Concerned regarding political environment. 3 1/2 years ago dog lost hearing. Have been caretakers for dog.       BEHAVIORAL HEALTH TREATMENT HISTORY  Does patient/parent report a history of prior behavioral health treatment for patient? Yes:    Dates Level of Care Facilty/Provider Diagnosis/Problem Medications   's                                                                        History of untreated behavioral health issues identified? No  Does patient/parent report change in appetite or weight loss/gain?  Lost appetite and some weight.   Does patient/parent report physical pain? No              Indicate if pain is acute or chronic, and location: not since hip replacement.              Pain scale rating:           FAMILY/SOCIAL HISTORY  Current living situation/household members: Patient lives with  of 31 years.  Does patient/parent report a family history of behavioral health issues, diagnoses, or treatment?   Family History   Problem Relation Age of Onset    Lung Disease Mother         emphysema    Alcohol/Drug Mother      Alcohol/Drug Father     Stroke Brother           EMPLOYMENT/RESOURCES  Is the patient currently employed? No  Does the patient/parent report adequate financial resources? Yes       HISTORY:  Does patient report current or past enlistment? No               [If yes, complete below items]  Does patient report history of exposure to combat? No      SPIRITUAL/CULTURAL/IDENTITY:  What are the patient's/family's spiritual beliefs or practices? Raised Mormon but not practicing.      ABUSE/NEGLECT/TRAUMA SCREENING  Does patient report feeling “unsafe” in his/her home, or afraid of anyone?  Wants to lock doors.   Does patient report any history of physical, sexual, or emotional abuse?  Brother suicide  Is there evidence of neglect by self? No  Is there evidence of neglect by a caregiver? No                                                                                                          SAFETY ASSESSMENT - SELF  Does patient acknowledge current or past symptoms of dangerousness to self?  Early 1970's with  out of town with ex   Recent change in frequency/specificity/intensity of suicidal thoughts or self-harm behavior? No  Current access to firearms, medications, or other identified means of suicide/self-harm? Yes  If yes, willing to restrict access to means of suicide/self-harm? Yes      Current Suicide Risk: Low  Crisis Safety Plan completed and copy given to patient: No      SAFETY ASSESSMENT - OTHERS  Recent change in frequency/specificity/intensity of thoughts or threats to harm others? No  If Yes:  Current access to firearms/other identified means of harm?   If yes, willing to restrict access to weapons/means of harm?     Current Homicide Risk:  Not applicable  Crisis Safety Plan completed and copy given to patient? No  Based on information provided during the current assessment, is a mandated “duty to warn” being exercised? No      SUBSTANCE USE/ADDICTION HISTORY  Patient denies use  of any substance/addictive behaviors Yes    If No:  Is there a family history of substance use/addiction?  Yes alcoholism  Does patient acknowledge or parent/significant other report use of/dependence on substances? No  Last time patient used alcohol: 2-3 weeks ago  Within the past week? No  Last time patient used marijuana: n/a  Within the past month? No  Any other street drugs ever tried even once? No  Any use of prescription medications/pills without a prescription, or for reasons others than originally prescribed?  No  Any other addictive behavior reported (gambling, shopping, sex)? No     Drug History:  Amphetamine:      Cannibis:      Cocaine:      Ecstasy:      Hallucinogen:      Inhalant:       Opiate:      Other:      Sedative:           MENTAL STATUS/OBSERVATIONS              Participation: Active verbal participation, Attentive, and Engaged  Grooming: Casual  Orientation:Alert and Fully Oriented   Behavior: Calm  Eye contact: Good          Mood:Anxious  Affect:Flexible, Full range, Sad, and Anxious  Thought process: Logical and Goal-directed  Thought content:  Within normal limits  Speech: Rate within normal limits and Volume within normal limits  Perception: Within normal limits  Memory: No gross evidence of memory deficits  Insight: Adequate  Judgment:  Adequate  Other:               Family/couple interaction observations: n/a      Patient's motivation/readiness for change: Get to the point of not having the eminent danger feeling.     Topics addressed in psychotherapy include: Automatic negative thoughts, Grieving putting dog down.     Care plan completed: No  Does patient express agreement with the above plan? Yes     Diagnosis:  1. Anxiety    2. Bereavement        Referral appointment(s) scheduled? No       TIFFANY Weller.

## 2022-11-28 ENCOUNTER — TELEMEDICINE (OUTPATIENT)
Dept: BEHAVIORAL HEALTH | Facility: CLINIC | Age: 77
End: 2022-11-28
Payer: MEDICARE

## 2022-11-28 DIAGNOSIS — Z63.4 BEREAVEMENT: ICD-10-CM

## 2022-11-28 PROCEDURE — 90837 PSYTX W PT 60 MINUTES: CPT | Mod: 95 | Performed by: MARRIAGE & FAMILY THERAPIST

## 2022-11-28 SDOH — SOCIAL STABILITY - SOCIAL INSECURITY: DISSAPEARANCE AND DEATH OF FAMILY MEMBER: Z63.4

## 2022-11-28 NOTE — PROGRESS NOTES
[facility]   Therapy Progress Note      This visit was conducted via Zoom using secure and encrypted videoconferencing technology.  The patient was in a private location in the [facility].  The patient's identity was confirmed and verbal consent was obtained for this virtual visit.  Place of Service: POS 10 -The patient is at Home during their visit           Name: Hallie Fisher  MRN: [id number]  : 1945  Age: 77 y.o.  Date of assessment: 2022  PCP: BLAKE Cadena  Persons in attendance: Patient  Total session time: 55 minutes    Objective Observations:   Participation:Active verbal participation, Attentive, and Engaged   Grooming:Casual   Cognition:Alert and Fully Oriented   Eye Contact:Good   Mood:Euthymic and Happy   Affect:Flexible and Full range   Thought Process:Logical and Goal-directed   Speech:Rate within normal limits and Volume within normal limits    Current Risk:   Suicide: low   Homicide: low   Self-Harm: low   Relapse: low   Safety Plan Reviewed: not applicable    Topics addressed in psychotherapy include: Patient reported that ericka has lost her flushed feeling since having a conversations with her dog that she had to put down. She is feeling better. Discussed taking a road trip in the spring. Patient discussed a friend that  during Covid. Discussed exploring different areas and activities. Worked with the patient to focus on positive rather than negative aspects of her life.  Discuss not only her next trip but the trip after.    This dictation has been created using voice recognition software and/or scribes. The accuracy of the dictation is limited by the abilities of the software and the expertise of the scribes. I expect there may be some errors of grammar and possibly content. I made every attempt to manually correct the errors within my dictation. However, errors related to voice recognition software and/or scribes may still exist and should be interpreted  within the appropriate context.    Care Plan Updated: No    Does patient express agreement with the above plan? Yes     Diagnosis:  1. Bereavement        Referral appointment(s) scheduled? No       TIFFANY Weller.

## 2022-12-27 ENCOUNTER — TELEMEDICINE (OUTPATIENT)
Dept: BEHAVIORAL HEALTH | Facility: CLINIC | Age: 77
End: 2022-12-27
Payer: MEDICARE

## 2022-12-27 DIAGNOSIS — F41.9 ANXIETY: ICD-10-CM

## 2022-12-27 DIAGNOSIS — Z63.4 BEREAVEMENT: ICD-10-CM

## 2022-12-27 PROCEDURE — 90837 PSYTX W PT 60 MINUTES: CPT | Mod: 95 | Performed by: MARRIAGE & FAMILY THERAPIST

## 2022-12-27 SDOH — SOCIAL STABILITY - SOCIAL INSECURITY: DISSAPEARANCE AND DEATH OF FAMILY MEMBER: Z63.4

## 2022-12-27 NOTE — PROGRESS NOTES
[facility]   Therapy Progress Note      This visit was conducted via Zoom using secure and encrypted videoconferencing technology.  The patient was in a private location in the [facility].  The patient's identity was confirmed and verbal consent was obtained for this virtual visit.  Place of Service: POS 10 -The patient is at Home during their visit           Name: Hallie Fisher  MRN: [id number]  : 1945  Age: 77 y.o.  Date of assessment: 2022  PCP: BLAKE Cadena  Persons in attendance: Patient  Total session time: 55 minutes    Objective Observations:   Participation:Active verbal participation, Attentive, and Engaged   Grooming:Casual   Cognition:Alert and Fully Oriented   Eye Contact:Good   Mood:Euthymic and Happy   Affect:Flexible and Full range   Thought Process:Logical and Goal-directed   Speech:Rate within normal limits and Volume within normal limits    Current Risk:   Suicide: low   Homicide: low   Self-Harm: low   Relapse: low   Safety Plan Reviewed: not applicable    Topics addressed in psychotherapy include: Met with the patient via zoom for an individual counseling session.  The patient discussed that she has recently had a 2 day staycation and felt really well. Upon returning, she immediately felt the loss of her dog. Her  noted that she has lost her happiness. Discussed things that bring her munir. Discussed being mindful of activities.  The patient reported that she will be starting yoga soon.  She discussed upcoming plans with friends as well as being more competitive in playing pickle ball  She reported that sleep is better.  The patient appears to be making very good progress.    This dictation has been created using voice recognition software and/or scribes. The accuracy of the dictation is limited by the abilities of the software and the expertise of the scribes. I expect there may be some errors of grammar and possibly content. I made every attempt to  manually correct the errors within my dictation. However, errors related to voice recognition software and/or scribes may still exist and should be interpreted within the appropriate context.      Care Plan Updated: No    Does patient express agreement with the above plan? Yes     Diagnosis:  1. Anxiety    2. Bereavement        Referral appointment(s) scheduled? No       TIFFANY Weller.

## 2023-01-05 ENCOUNTER — OFFICE VISIT (OUTPATIENT)
Dept: MEDICAL GROUP | Facility: MEDICAL CENTER | Age: 78
End: 2023-01-05
Payer: MEDICARE

## 2023-01-05 VITALS
SYSTOLIC BLOOD PRESSURE: 130 MMHG | BODY MASS INDEX: 22.47 KG/M2 | WEIGHT: 119 LBS | OXYGEN SATURATION: 95 % | TEMPERATURE: 98.1 F | DIASTOLIC BLOOD PRESSURE: 60 MMHG | HEIGHT: 61 IN | RESPIRATION RATE: 16 BRPM | HEART RATE: 75 BPM

## 2023-01-05 DIAGNOSIS — F41.9 ANXIETY: ICD-10-CM

## 2023-01-05 PROCEDURE — 99214 OFFICE O/P EST MOD 30 MIN: CPT | Performed by: NURSE PRACTITIONER

## 2023-01-05 RX ORDER — DIAZEPAM 5 MG/1
5 TABLET ORAL EVERY 12 HOURS PRN
Qty: 30 TABLET | Refills: 1 | Status: SHIPPED | OUTPATIENT
Start: 2023-01-05 | End: 2023-04-29 | Stop reason: SDUPTHER

## 2023-01-05 RX ORDER — DIAZEPAM 5 MG/1
TABLET ORAL
COMMUNITY
Start: 2022-08-29 | End: 2023-01-05

## 2023-01-05 RX ORDER — SERTRALINE HYDROCHLORIDE 25 MG/1
25 TABLET, FILM COATED ORAL DAILY
Qty: 30 TABLET | Refills: 1 | Status: SHIPPED | OUTPATIENT
Start: 2023-01-05 | End: 2023-01-28

## 2023-01-05 RX ORDER — DIAZEPAM 5 MG/1
TABLET ORAL
COMMUNITY
Start: 2022-12-09 | End: 2023-01-05 | Stop reason: SDUPTHER

## 2023-01-06 ENCOUNTER — HOSPITAL ENCOUNTER (OUTPATIENT)
Dept: LAB | Facility: MEDICAL CENTER | Age: 78
End: 2023-01-06
Attending: NURSE PRACTITIONER
Payer: MEDICARE

## 2023-01-06 DIAGNOSIS — F41.9 ANXIETY: ICD-10-CM

## 2023-01-06 LAB
25(OH)D3 SERPL-MCNC: 65 NG/ML (ref 30–100)
FOLATE SERPL-MCNC: >40 NG/ML
TSH SERPL DL<=0.005 MIU/L-ACNC: 1.99 UIU/ML (ref 0.38–5.33)
VIT B12 SERPL-MCNC: 1045 PG/ML (ref 211–911)

## 2023-01-06 PROCEDURE — 84443 ASSAY THYROID STIM HORMONE: CPT

## 2023-01-06 PROCEDURE — 82607 VITAMIN B-12: CPT

## 2023-01-06 PROCEDURE — 82306 VITAMIN D 25 HYDROXY: CPT

## 2023-01-06 PROCEDURE — 36415 COLL VENOUS BLD VENIPUNCTURE: CPT

## 2023-01-06 PROCEDURE — 82746 ASSAY OF FOLIC ACID SERUM: CPT

## 2023-01-09 ENCOUNTER — TELEMEDICINE (OUTPATIENT)
Dept: SLEEP MEDICINE | Facility: MEDICAL CENTER | Age: 78
End: 2023-01-09
Payer: MEDICARE

## 2023-01-09 VITALS — BODY MASS INDEX: 21.71 KG/M2 | WEIGHT: 118 LBS | HEIGHT: 62 IN

## 2023-01-09 DIAGNOSIS — Z78.9 NONSMOKER: ICD-10-CM

## 2023-01-09 DIAGNOSIS — G47.33 OSA (OBSTRUCTIVE SLEEP APNEA): Chronic | ICD-10-CM

## 2023-01-09 PROCEDURE — 99213 OFFICE O/P EST LOW 20 MIN: CPT | Mod: 95 | Performed by: NURSE PRACTITIONER

## 2023-01-09 ASSESSMENT — PATIENT HEALTH QUESTIONNAIRE - PHQ9: CLINICAL INTERPRETATION OF PHQ2 SCORE: 0

## 2023-01-09 NOTE — PROGRESS NOTES
Virtual Visit: Established Patient   This visit was conducted via Zoom using secure and encrypted videoconferencing technology.   The patient was in their home in the [facility].    The patient's identity was confirmed and verbal consent was obtained for this virtual visit.     Subjective:   CC:   Chief Complaint   Patient presents with    Follow-Up     Apnea // Last seen 1/10/2022       Hallie Fisher is a 77 y.o. female presenting for evaluation and management of:    MONIK  Last OV 1/10/22 via telemedicine    Currently using APAP 6-12cm; RESPIRONICS DREAMSTATION 2 OBTAINED 2022 FROM RECALL.  She also has her older device that she uses during travel.  She notes always using a device every night.  Compliance report 12/9/2022 through 1/7/2023 indicates 90% compliance, average nightly use 7 hours 59 seconds, mean pressure 7.1 cm, no significant mask leak with a reduced AHI of 3.1/h.  She tolerates mask and pressure well.  Denies morning headaches.  Overall feels she is sleeping very well at this time.  She was placed on an antidepressant due to increased anxiety and this has overall helped her quite a bit.  She denies morning headaches and energy is consistent during the day.  No daytime sleepiness or napping.  She denies cardiac or respiratory symptoms.  She has now adjusted her humidifier to 3 and this is working very well for her.  She has no other complaints.    Sleep hx:  Prior PSG indicated severe sleep apnea with an AHI of 41.7/h and a javier of 83%.  Patient currently uses auto CPAP 6 to 12 cm nightly.    ROS   In HPI; otherwise negative    Current medicines (including changes today)  Current Outpatient Medications   Medication Sig Dispense Refill    sertraline (ZOLOFT) 25 MG tablet Take 1 Tablet by mouth every day. 30 Tablet 1    diazePAM (VALIUM) 5 MG Tab Take 1 Tablet by mouth every 12 hours as needed for Anxiety for up to 30 days. 30 Tablet 1    atorvastatin (LIPITOR) 10 MG Tab Take 1 Tablet by mouth every  "48 hours. 45 Tablet 3    Multiple Vitamins-Minerals (CENTRUM SILVER) TABS Take  by mouth every day.      Magnesium 400 MG CAPS Take  by mouth every 48 hours as needed.      Calcium Carbonate-Vitamin D (CALCIUM 600 + D PO) Take  by mouth every day.      Cholecalciferol (VITAMIN D) 2000 UNITS CAPS Take  by mouth every day.       No current facility-administered medications for this visit.       Patient Active Problem List    Diagnosis Date Noted    Anxiety 12/27/2022    Bereavement 11/08/2022    Osteoarthritis of multiple joints 08/29/2022    History of meningioma 11/26/2019    Anesthesia     Hyperlipidemia LDL goal <100 03/08/2017    Torn rotator cuff     BPPV (benign paroxysmal positional vertigo)     Closed fracture of left radius with routine healing 09/04/2013    MONIK (obstructive sleep apnea) 08/19/2013        Objective:   Ht 1.575 m (5' 2\")   Wt 53.5 kg (118 lb)   BMI 21.58 kg/m²     Physical Exam:  Constitutional: Alert, no distress, well-groomed.  Skin: No rashes in visible areas.  Eye: Round. Conjunctiva clear, lids normal. No icterus.   ENMT: Lips pink without lesions, good dentition, moist mucous membranes. Phonation normal.  Neck: No masses, no thyromegaly. Moves freely without pain.  Respiratory: Unlabored respiratory effort, no cough or audible wheeze  Psych: Alert and oriented x3, normal affect and mood.     Assessment and Plan:   The following treatment plan was discussed:     1. MONIK (obstructive sleep apnea)  - DME Mask and Supplies    2. BMI 21.0-21.9, adult    3. Nonsmoker    MONIK is well treated; continue APAP 6-12cm  DME mask/supplies  Discussed sleep hygiene  F/u with PCP for other health concerns      Follow-up: Return in about 1 year (around 1/9/2024) for Ni SEVILLA, AT SLEEP, SHORT follow up, Sooner if needed., With Compliance Card.           "

## 2023-02-10 NOTE — PROGRESS NOTES
Chief Complaint   Patient presents with   • Follow-Up     MONIK       HPI:  Hallie Fisher is a 73 y.o. year old female here today for follow-up for MONIK. Polysomnogram indicates AHI 41.7 and minimum saturation 83%. The patient is compliant with APAP 6-12 cm.  Compliance card 10/20/2018 through 11/18/2018 indicates 96.7% compliance, average nightly use of 5.5 hours, mean pressure 7.0, minimal mask leaking and an overall AHI of 3.3.  She has no complaints.  She sleeps well.  She missed 1 out of therapy due to her dog having issues finding his way back in the house due to his cataracts.  She denies morning headaches.  She has consistent daytime energy.  She is an avid .  She denies any changes in health over the last year.    History of hyperlipidemia.  Non-smoker. BMI 22.        ROS: As per HPI and otherwise negative if not stated.    Past Medical History:   Diagnosis Date   • Anesthesia     PONV   • BPPV (benign paroxysmal positional vertigo)    • Closed fracture of radius    • Hyperlipidemia    • Sleep apnea     CPAP - PMA   • Torn rotator cuff     right.  has seen Omar and will have [facility] in 1/16       Past Surgical History:   Procedure Laterality Date   • SHOULDER DECOMPRESSION ARTHROSCOPIC Right 3/1/2016    Procedure: SHOULDER DECOMPRESSION ARTHROSCOPIC - SUBACROMIAL, LABRAL DEBRIDMENT;  Surgeon: Shama Nelson M.D.;  Location: [facility];  Service:    • SHOULDER ARTHROSCOPY W/ ROTATOR CUFF REPAIR Right 3/1/2016    Procedure: SHOULDER ARTHROSCOPY W/ ROTATOR CUFF REPAIR;  Surgeon: Shama Nelson M.D.;  Location: [facility];  Service:    • WRIST ORIF  9/4/2013    Performed by Prasanna Anthony M.D. at [facility]   • CARPAL TUNNEL RELEASE  9/4/2013    Performed by Prasanna Anthony M.D. at [facility]   • KNEE ARTHROSCOPY Left 2009     - Dr. Newman; left   • LUMPECTOMY Right 1970's    right breast cyst removal   • TONSILLECTOMY  as  "child       Family History   Problem Relation Age of Onset   • Lung Disease Mother         emphysema   • Alcohol/Drug Mother    • Alcohol/Drug Father    • Stroke Brother        Social History     Social History   • Marital status:      Spouse name: N/A   • Number of children: N/A   • Years of education: N/A     Occupational History   • Not on file.     Social History Main Topics   • Smoking status: Never Smoker   • Smokeless tobacco: Never Used   • Alcohol use Yes      Comment: one per week or once a month    • Drug use: No   • Sexual activity: Not on file      Comment: ,  [employer],part time; no kids     Other Topics Concern   • Not on file     Social History Narrative   • No narrative on file       Allergies as of 11/19/2018 - Reviewed 11/19/2018   Allergen Reaction Noted   • Penicillins Hives 02/19/2016        @Vital signs for this encounter:  Vitals:    11/19/18 0805   Height: 1.562 m (5' 1.5\")   Weight: 54.9 kg (121 lb)   Weight % change since last entry.: 0 %   BP: 110/66   Pulse: (!) 54   BMI (Calculated): 22.49   Resp: 16       Current medications as of today   Current Outpatient Prescriptions   Medication Sig Dispense Refill   • atorvastatin (LIPITOR) 10 MG Tab Take 1 Tab by mouth every 48 hours. 45 Tab 3   • ascorbic acid (VITAMIN C) 500 MG tablet Take 1 Tab by mouth every day. (Patient taking differently: Take 500 mg by mouth every 48 hours.) 30 Each 3   • Multiple Vitamins-Minerals (CENTRUM SILVER) TABS Take  by mouth every day.     • Magnesium 400 MG CAPS Take  by mouth every 48 hours as needed.     • Calcium Carbonate-Vitamin D (CALCIUM 600 + D PO) Take  by mouth every day.     • Cholecalciferol (VITAMIN D) 2000 UNITS CAPS Take  by mouth every day.       No current facility-administered medications for this visit.          Physical Exam:   Gen:           Alert and oriented, No apparent distress. Mood and affect appropriate, normal interaction with examiner.  Eyes:        "   PERRL, EOM intact, sclere white, conjunctive moist.  Ears:          Not examined.   Hearing:     Grossly intact.  Nose:          Normal, no lesions or deformities.  Dentition:    Good dentition.  Oropharynx:   Tongue normal.  Mallampati Classification: not examined.  Neck:        Supple, trachea midline, no masses.  Respiratory Effort: No intercostal retractions or use of accessory muscles.   Lung Auscultation:      Clear to auscultation bilaterally; no rales, rhonchi or wheezing.  CV:            Regular rate and rhythm. No murmurs, rubs or gallops.  Abd:           Not examined.   Lymphadenopathy: Not examined.  Gait and Station: Normal.  Digits and Nails: No clubbing, cyanosis, petechiae, or nodes.   Cranial Nerves: II-XII grossly intact.  Skin:        No rashes, lesions or ulcers noted.               Ext:           No cyanosis or edema.      Assessment:  1. MONIK (obstructive sleep apnea)     2. BMI 22.0-22.9, adult     3. Nonsmoker     4. Hyperlipidemia LDL goal <100         Immunizations:    Flu:9/2018  Pneumovax 23:2013  Prevnar 13:2016    Plan:  1.  Continue CPAP nightly.  DME mask/supplies.  2.  Discussed pressure hygiene.  3.  Follow-up in 1 year with compliance card, sooner if needed.    Please note that this dictation was created using voice recognition software. I have made every reasonable attempt to correct obvious errors, but it is possible there are errors of grammar and possibly content that I did not discover before finalizing the note.   Yes...

## 2023-04-29 DIAGNOSIS — F41.9 ANXIETY: ICD-10-CM

## 2023-04-29 RX ORDER — DIAZEPAM 5 MG/1
5 TABLET ORAL EVERY 12 HOURS PRN
Qty: 30 TABLET | Refills: 0 | Status: SHIPPED | OUTPATIENT
Start: 2023-04-29 | End: 2023-07-03 | Stop reason: SDUPTHER

## 2023-05-18 ENCOUNTER — TELEPHONE (OUTPATIENT)
Dept: HEALTH INFORMATION MANAGEMENT | Facility: OTHER | Age: 78
End: 2023-05-18
Payer: MEDICARE

## 2023-05-22 ENCOUNTER — PHARMACY VISIT (OUTPATIENT)
Dept: PHARMACY | Facility: MEDICAL CENTER | Age: 78
End: 2023-05-22
Payer: COMMERCIAL

## 2023-05-22 PROCEDURE — RXMED WILLOW AMBULATORY MEDICATION CHARGE: Performed by: INTERNAL MEDICINE

## 2023-05-22 RX ORDER — ZOSTER VACCINE RECOMBINANT, ADJUVANTED 50 MCG/0.5
KIT INTRAMUSCULAR
Qty: 0.5 ML | Refills: 0 | Status: SHIPPED | OUTPATIENT
Start: 2023-05-22 | End: 2023-07-03

## 2023-07-03 ENCOUNTER — OFFICE VISIT (OUTPATIENT)
Dept: MEDICAL GROUP | Facility: MEDICAL CENTER | Age: 78
End: 2023-07-03
Payer: MEDICARE

## 2023-07-03 VITALS
HEIGHT: 62 IN | HEART RATE: 77 BPM | WEIGHT: 119 LBS | RESPIRATION RATE: 17 BRPM | BODY MASS INDEX: 21.9 KG/M2 | SYSTOLIC BLOOD PRESSURE: 120 MMHG | TEMPERATURE: 98.1 F | OXYGEN SATURATION: 96 % | DIASTOLIC BLOOD PRESSURE: 60 MMHG

## 2023-07-03 DIAGNOSIS — G89.29 CHRONIC RIGHT SI JOINT PAIN: ICD-10-CM

## 2023-07-03 DIAGNOSIS — E78.5 HYPERLIPIDEMIA LDL GOAL <100: ICD-10-CM

## 2023-07-03 DIAGNOSIS — N94.10 DYSPAREUNIA, FEMALE: ICD-10-CM

## 2023-07-03 DIAGNOSIS — F41.9 ANXIETY: ICD-10-CM

## 2023-07-03 DIAGNOSIS — M53.3 CHRONIC RIGHT SI JOINT PAIN: ICD-10-CM

## 2023-07-03 DIAGNOSIS — D50.9 IRON DEFICIENCY ANEMIA, UNSPECIFIED IRON DEFICIENCY ANEMIA TYPE: ICD-10-CM

## 2023-07-03 PROCEDURE — 3074F SYST BP LT 130 MM HG: CPT | Performed by: NURSE PRACTITIONER

## 2023-07-03 PROCEDURE — 99214 OFFICE O/P EST MOD 30 MIN: CPT | Performed by: NURSE PRACTITIONER

## 2023-07-03 PROCEDURE — 3078F DIAST BP <80 MM HG: CPT | Performed by: NURSE PRACTITIONER

## 2023-07-03 RX ORDER — DIAZEPAM 5 MG/1
5 TABLET ORAL
Qty: 30 TABLET | Refills: 1 | Status: SHIPPED | OUTPATIENT
Start: 2023-07-03 | End: 2023-10-01

## 2023-07-04 NOTE — PROGRESS NOTES
Subjective:     Hallie Fisher is a 78 y.o. female who presents with anxiety.    HPI:   Seen in f/u for anxiety.   She is being seen by psychologist for grief.  Diana was doing really well until several weeks ago she started that she was going backwards.  Her and her counselor have been discussing many issues.  She initially thought it was too much med when she was put on zoloft 25 mg but Dr Lee told her it is too little.  Diana has come a long way with her counseling.  Now doesn't want to backslide.  Counselor told her should be able to stop counseling by end of the year.  She is playing pickleball.  She did fall when she initially started playing it.  She is now having pain in her rt SI joint.  No leg numbness.  Still ablle to play pickleball.  She has not had intercourse in 10 years.  Her  had viagra but there was pain d/t her atrophy when they tried recently.  It was painful.      Patient Active Problem List    Diagnosis Date Noted    Anxiety 12/27/2022    Bereavement 11/08/2022    Osteoarthritis of multiple joints 08/29/2022    History of meningioma 11/26/2019    Anesthesia     Hyperlipidemia LDL goal <100 03/08/2017    Torn rotator cuff     BPPV (benign paroxysmal positional vertigo)     Closed fracture of left radius with routine healing 09/04/2013    MONIK (obstructive sleep apnea) 08/19/2013       Current medicines (including changes today)  Current Outpatient Medications   Medication Sig Dispense Refill    sertraline (ZOLOFT) 50 MG Tab Take 1 Tablet by mouth every day. 90 Tablet 1    diazePAM (VALIUM) 5 MG Tab Take 1 Tablet by mouth every 24 hours as needed for Anxiety for up to 90 days. 30 tabs to last 90 days with one additional refill 30 Tablet 1    atorvastatin (LIPITOR) 10 MG Tab Take 1 Tablet by mouth every 48 hours. 45 Tablet 3    Multiple Vitamins-Minerals (CENTRUM SILVER) TABS Take  by mouth every day.      Magnesium 400 MG CAPS Take  by mouth every 48 hours as needed.      Calcium  "Carbonate-Vitamin D (CALCIUM 600 + D PO) Take  by mouth every day.      Cholecalciferol (VITAMIN D) 2000 UNITS CAPS Take  by mouth every day.       No current facility-administered medications for this visit.       Allergies   Allergen Reactions    Penicillins Hives       ROS  Constitutional: Negative. Negative for fever, chills, weight loss, malaise/fatigue and diaphoresis.   HENT: Negative. Negative for hearing loss, ear pain, nosebleeds, congestion, sore throat, neck pain, tinnitus and ear discharge.   Respiratory: Negative. Negative for cough, hemoptysis, sputum production, shortness of breath, wheezing and stridor.   Cardiovascular: Negative. Negative for chest pain, palpitations, orthopnea, claudication, leg swelling and PND.   Gastrointestinal: Denies nausea, vomiting, diarrhea, constipation, heartburn, melena or hematochezia.  Genitourinary: Denies dysuria, hematuria, urinary incontinence, frequency or urgency.        Objective:     /60 (BP Location: Right arm, Patient Position: Sitting, BP Cuff Size: Adult)   Pulse 77   Temp 36.7 °C (98.1 °F) (Temporal)   Resp 17   Ht 1.575 m (5' 2\")   Wt 54 kg (119 lb)   SpO2 96%  Body mass index is 21.77 kg/m².    Physical Exam:  Vitals reviewed.  Constitutional: Oriented to person, place, and time. appears well-developed and well-nourished. No distress.   Cardiovascular: Normal rate, regular rhythm, normal heart sounds and intact distal pulses. Exam reveals no gallop and no friction rub. No murmur heard. No carotid bruits.   Pulmonary/Chest: Effort normal and breath sounds normal. No stridor. No respiratory distress. no wheezes or rales. exhibits no tenderness.   Musculoskeletal: Normal range of motion. exhibits no edema. nedra pedal pulses 2+.  2+ nedra knee reflexes, neg nedra SLR.  Sensation and strength intact nedra LE.   Lymphadenopathy: No cervical or supraclavicular adenopathy.   Neurological: Alert and oriented to person, place, and time. exhibits normal " muscle tone.  Skin: Skin is warm and dry. No diaphoresis.   Psychiatric: Normal mood and affect. Behavior is normal.      Assessment and Plan:     The following treatment plan was discussed:    1. Anxiety  sertraline (ZOLOFT) 50 MG Tab    diazePAM (VALIUM) 5 MG Tab    increase zoloft to 50 mg.  RF valium for prn use.  f/u 2 mo for lab review      2. Chronic right SI joint pain  DX-LUMBAR SPINE-2 OR 3 VIEWS    lumbar xray. f/u w/pt w/rsults.      3. Dyspareunia, female      attempt to increase vaginal vault size as discussed.  consider estradiol if sx persist            Followup: Return in about 2 months (around 9/3/2023).

## 2023-07-05 ENCOUNTER — HOSPITAL ENCOUNTER (OUTPATIENT)
Dept: RADIOLOGY | Facility: MEDICAL CENTER | Age: 78
End: 2023-07-05
Attending: NURSE PRACTITIONER
Payer: MEDICARE

## 2023-07-05 DIAGNOSIS — G89.29 CHRONIC RIGHT SI JOINT PAIN: ICD-10-CM

## 2023-07-05 DIAGNOSIS — M53.3 CHRONIC RIGHT SI JOINT PAIN: ICD-10-CM

## 2023-07-05 PROCEDURE — 72100 X-RAY EXAM L-S SPINE 2/3 VWS: CPT

## 2023-07-10 DIAGNOSIS — M53.3 CHRONIC RIGHT SI JOINT PAIN: ICD-10-CM

## 2023-07-10 DIAGNOSIS — M51.36 DDD (DEGENERATIVE DISC DISEASE), LUMBAR: ICD-10-CM

## 2023-07-10 DIAGNOSIS — G89.29 CHRONIC RIGHT SI JOINT PAIN: ICD-10-CM

## 2023-07-10 DIAGNOSIS — M54.50 ACUTE BILATERAL LOW BACK PAIN WITHOUT SCIATICA: ICD-10-CM

## 2023-08-17 ENCOUNTER — PHYSICAL THERAPY (OUTPATIENT)
Dept: PHYSICAL THERAPY | Facility: MEDICAL CENTER | Age: 78
End: 2023-08-17
Attending: NURSE PRACTITIONER
Payer: MEDICARE

## 2023-08-17 DIAGNOSIS — M54.50 ACUTE RIGHT-SIDED LOW BACK PAIN WITHOUT SCIATICA: ICD-10-CM

## 2023-08-17 PROCEDURE — 97161 PT EVAL LOW COMPLEX 20 MIN: CPT

## 2023-08-17 ASSESSMENT — ENCOUNTER SYMPTOMS
EXACERBATED BY: PROLONGED SITTING
EXACERBATED BY: BENDING
ALLEVIATING FACTORS: ICE
PAIN SCALE AT HIGHEST: 0
ALLEVIATING FACTORS: MOVEMENT
ALLEVIATING FACTORS: HEATING PAD
PAIN SCALE AT LOWEST: 6
PAIN SCALE: 1
PAIN TIMING: IN THE MORNING
QUALITY: DULL ACHE

## 2023-08-17 NOTE — OP THERAPY EVALUATION
Outpatient Physical Therapy  INITIAL EVALUATION    [facility]  [address]  [place] [address]  Phone:  [phone]  Fax:  [phone]    Date of Evaluation: 2023    Patient: Hallie Fisher  YOB: 1945  MRN: [id number]     Referring Provider: BLAKE Cadena  [address]  [place] [address]   Referring Diagnosis Sacrococcygeal disorders, not elsewhere classified [M53.3];Other chronic pain [G89.29];Low back pain, unspecified [M54.50];Other intervertebral disc degeneration, lumbar region [M51.36]     Time Calculation  Start time: 1030  Stop time: 1115 Time Calculation (min): 45 minutes         Chief Complaint: Back Problem    Visit Diagnoses     ICD-10-CM   1. Acute right-sided low back pain without sciatica  M54.50       Date of onset of impairment: 2023    Subjective:   History of Present Illness:     Date of onset:  2023    Mechanism of injury:  Pt reports she was playing pickleball, she reports during this match she fell and didn't have immediate pain. But she does report a few days after her fall she had some low back pain. Pt reports currently her back pain is improving, notes that when she does get back pain it is primarily R sided.  Sleep disturbance:  Interrupted sleep  Pain:     Current pain ratin    At best pain ratin    At worst pain ratin    Quality:  Dull ache    Pain timing:  In the morning    Relieving factors:  Ice, heating pad and movement    Aggravating factors:  Prolonged sitting and bending    Progression:  Improving  Social Support:     Lives in:  One-story house    Lives with:  Spouse  Hand dominance:  Right  Treatments:     Current treatment:  Ice, heat and medication  Patient Goals:     Patient goals for therapy:  Decreased pain and return to sport/leisure activities      Past Medical History:   Diagnosis Date    Anesthesia     PONV    Anxiety  12/27/2022    Bereavement 11/8/2022    BPPV (benign paroxysmal positional vertigo)     Closed fracture of radius     History of meningioma 11/26/2019    Hyperlipidemia     Osteoarthritis of multiple joints 8/29/2022    Sleep apnea     CPAP - PMA    Torn rotator cuff     right.  has seen Omar and will have [facility] in 1/16     Past Surgical History:   Procedure Laterality Date    FL TOTAL HIP ARTHROPLASTY Left 5/9/2022    Procedure: LEFT ANTERIOR TOTAL HIP ARTHROPLASTY;  Surgeon: Juwan Espino M.D.;  Location: [facility];  Service: Orthopedics    SHOULDER DECOMPRESSION ARTHROSCOPIC Right 3/1/2016    Procedure: SHOULDER DECOMPRESSION ARTHROSCOPIC - SUBACROMIAL, LABRAL DEBRIDMENT;  Surgeon: Shama Nelson M.D.;  Location: [facility];  Service:     SHOULDER ARTHROSCOPY W/ ROTATOR CUFF REPAIR Right 3/1/2016    Procedure: SHOULDER ARTHROSCOPY W/ ROTATOR CUFF REPAIR;  Surgeon: Shama Nelson M.D.;  Location: [facility];  Service:     ORIF, WRIST  9/4/2013    Performed by Prasanna Anthony M.D. at SUR[facility]    CARPAL TUNNEL RELEASE  9/4/2013    Performed by Prasanna Anthony M.D. at [facility]    KNEE ARTHROSCOPY Left 2009     - Dr. Newman; left    LUMPECTOMY Right 1970's    right breast cyst removal    TONSILLECTOMY  as child     Social History     Tobacco Use    Smoking status: Never    Smokeless tobacco: Never   Substance Use Topics    Alcohol use: Yes     Comment: one per week or once a month      Family and Occupational History     Socioeconomic History    Marital status:      Spouse name: Not on file    Number of children: Not on file    Years of education: Not on file    Highest education level: Associate degree: academic program   Occupational History    Not on file       Objective     Hip Screen   Hip range of motion within functional limits.  Hip strength within functional limits    Neurological Testing     Myotome  testing   Lumbar (left)   All left lumbar myotomes within normal limits    Lumbar (right)   All right lumbar myotomes within normal limits    Dermatome testing   Lumbar (left)   All left lumbar dermatomes intact    Lumbar (right)   All right lumbar dermatomes intact    Active Range of Motion     Lumbar   All lumbar active range of motion within functional limits        Therapeutic Exercises (CPT 61360):     1. Pelvic tilt, HEP    2. Pelvtic tilt with SLR, HEP    3. LTR, HEP      Therapeutic Exercise Summary: Pt was educated today regarding current condition, expectations for rehab potential and appropriate exercise program for home. HEP was given to patient in print out form and instructions were communicated to pt.         Time-based treatments/modalities:           Assessment, Response and Plan:   Impairments: hypersensitivity    Assessment details:  Pt is a 77 y/o F presenting to PT with signs and symptoms consistent with non-specific low back pain. Pt has deficits of pain. Given pt's age, overall health, current condition and adherence to PT recommendations, anticipated duration of episode is 8 weeks.      Prognosis: good    Goals:   Short Term Goals:   1. Pt will achieve a 5 or lower on Cedrick-Laurent  2. Pt will report pain no higher than 4/10  Short term goal time span:  2-4 weeks      Long Term Goals:    1. Pt will be able to return to all recreational activities pain free or near pain free (2/10 or lower on numeric pain rating scale)    Long term goal time span:  6-8 weeks    Plan:   Therapy options:  Physical therapy treatment to continue  Planned therapy interventions:  Therapeutic Exercise (CPT 72905) and Therapeutic Activities (CPT 24822)  Frequency:  1x week  Duration in weeks:  8  Discussed with:  Patient      Functional Assessment Used  Cedrick Laurent Low Back Pain and Disability Score: 8.33     Referring provider co-signature:  I have reviewed this plan of care and my co-signature certifies the need  for services.    Certification Period: 08/17/2023 to  10/16/23    Physician Signature: ________________________________ Date: ______________

## 2023-08-25 ENCOUNTER — HOSPITAL ENCOUNTER (OUTPATIENT)
Dept: LAB | Facility: MEDICAL CENTER | Age: 78
End: 2023-08-25
Attending: NURSE PRACTITIONER
Payer: MEDICARE

## 2023-08-25 ENCOUNTER — PHYSICAL THERAPY (OUTPATIENT)
Dept: PHYSICAL THERAPY | Facility: MEDICAL CENTER | Age: 78
End: 2023-08-25
Attending: NURSE PRACTITIONER
Payer: MEDICARE

## 2023-08-25 DIAGNOSIS — E78.5 HYPERLIPIDEMIA LDL GOAL <100: ICD-10-CM

## 2023-08-25 DIAGNOSIS — D50.9 IRON DEFICIENCY ANEMIA, UNSPECIFIED IRON DEFICIENCY ANEMIA TYPE: ICD-10-CM

## 2023-08-25 DIAGNOSIS — M54.50 ACUTE RIGHT-SIDED LOW BACK PAIN WITHOUT SCIATICA: ICD-10-CM

## 2023-08-25 LAB
ALBUMIN SERPL BCP-MCNC: 4.6 G/DL (ref 3.2–4.9)
ALBUMIN/GLOB SERPL: 2.2 G/DL
ALP SERPL-CCNC: 106 U/L (ref 30–99)
ALT SERPL-CCNC: 18 U/L (ref 2–50)
ANION GAP SERPL CALC-SCNC: 8 MMOL/L (ref 7–16)
AST SERPL-CCNC: 25 U/L (ref 12–45)
BASOPHILS # BLD AUTO: 0.7 % (ref 0–1.8)
BASOPHILS # BLD: 0.04 K/UL (ref 0–0.12)
BILIRUB SERPL-MCNC: 0.3 MG/DL (ref 0.1–1.5)
BUN SERPL-MCNC: 10 MG/DL (ref 8–22)
CALCIUM ALBUM COR SERPL-MCNC: 8.9 MG/DL (ref 8.5–10.5)
CALCIUM SERPL-MCNC: 9.4 MG/DL (ref 8.5–10.5)
CHLORIDE SERPL-SCNC: 103 MMOL/L (ref 96–112)
CHOLEST SERPL-MCNC: 192 MG/DL (ref 100–199)
CO2 SERPL-SCNC: 28 MMOL/L (ref 20–33)
CREAT SERPL-MCNC: 0.71 MG/DL (ref 0.5–1.4)
EOSINOPHIL # BLD AUTO: 0.07 K/UL (ref 0–0.51)
EOSINOPHIL NFR BLD: 1.3 % (ref 0–6.9)
ERYTHROCYTE [DISTWIDTH] IN BLOOD BY AUTOMATED COUNT: 47.8 FL (ref 35.9–50)
GFR SERPLBLD CREATININE-BSD FMLA CKD-EPI: 88 ML/MIN/1.73 M 2
GLOBULIN SER CALC-MCNC: 2.1 G/DL (ref 1.9–3.5)
GLUCOSE SERPL-MCNC: 100 MG/DL (ref 65–99)
HCT VFR BLD AUTO: 40 % (ref 37–47)
HDLC SERPL-MCNC: 70 MG/DL
HGB BLD-MCNC: 12.9 G/DL (ref 12–16)
IMM GRANULOCYTES # BLD AUTO: 0.02 K/UL (ref 0–0.11)
IMM GRANULOCYTES NFR BLD AUTO: 0.4 % (ref 0–0.9)
LDLC SERPL CALC-MCNC: 102 MG/DL
LYMPHOCYTES # BLD AUTO: 1.89 K/UL (ref 1–4.8)
LYMPHOCYTES NFR BLD: 33.9 % (ref 22–41)
MCH RBC QN AUTO: 31.2 PG (ref 27–33)
MCHC RBC AUTO-ENTMCNC: 32.3 G/DL (ref 32.2–35.5)
MCV RBC AUTO: 96.6 FL (ref 81.4–97.8)
MONOCYTES # BLD AUTO: 0.43 K/UL (ref 0–0.85)
MONOCYTES NFR BLD AUTO: 7.7 % (ref 0–13.4)
NEUTROPHILS # BLD AUTO: 3.12 K/UL (ref 1.82–7.42)
NEUTROPHILS NFR BLD: 56 % (ref 44–72)
NRBC # BLD AUTO: 0 K/UL
NRBC BLD-RTO: 0 /100 WBC (ref 0–0.2)
PLATELET # BLD AUTO: 365 K/UL (ref 164–446)
PMV BLD AUTO: 9.8 FL (ref 9–12.9)
POTASSIUM SERPL-SCNC: 4.4 MMOL/L (ref 3.6–5.5)
PROT SERPL-MCNC: 6.7 G/DL (ref 6–8.2)
RBC # BLD AUTO: 4.14 M/UL (ref 4.2–5.4)
SODIUM SERPL-SCNC: 139 MMOL/L (ref 135–145)
TRIGL SERPL-MCNC: 102 MG/DL (ref 0–149)
WBC # BLD AUTO: 5.6 K/UL (ref 4.8–10.8)

## 2023-08-25 PROCEDURE — 85025 COMPLETE CBC W/AUTO DIFF WBC: CPT

## 2023-08-25 PROCEDURE — 36415 COLL VENOUS BLD VENIPUNCTURE: CPT

## 2023-08-25 PROCEDURE — 97110 THERAPEUTIC EXERCISES: CPT

## 2023-08-25 PROCEDURE — 80053 COMPREHEN METABOLIC PANEL: CPT

## 2023-08-25 PROCEDURE — 97140 MANUAL THERAPY 1/> REGIONS: CPT

## 2023-08-25 PROCEDURE — 80061 LIPID PANEL: CPT

## 2023-08-25 NOTE — OP THERAPY DAILY TREATMENT
"  Outpatient Physical Therapy  DAILY TREATMENT     [facility]  [address]  [place] [address]  Phone:  [phone]  Fax:  [phone]    Date: 08/25/2023    Patient: Hallie Fisher  YOB: 1945  MRN: [id number]     Time Calculation    Start time: 0900  Stop time: 0940 Time Calculation (min): 40 minutes         Chief Complaint: Back Problem    Visit #: 2    SUBJECTIVE:  Pt reports she is slightly feeling her back more following the initial grouping of HEP exercises. She does note that she feels good with walking and pickleball.             Therapeutic Exercises (CPT 81611):     1. Pelvic tilt, 2x10, HEP    2. Pelvtic tilt with SLR, 2x10, HEP    3. LTR, 2x10, HEP    4. 3 way ball roll lumbar stretch, x10 10\" holds, HEP    5. Nustep, 6 minutes, HEP      Therapeutic Exercise Summary: Pt was educated today regarding stretching program additions.       Therapeutic Treatments and Modalities:     1. Manual Therapy (CPT 41454), hooklying belted traction, 15 mins    Time-based treatments/modalities:    Physical Therapy Timed Treatment Charges  Manual therapy minutes (CPT 75671): 10 minutes  Therapeutic exercise minutes (CPT 01486): 30 minutes      Pain rating (1-10) before treatment:  5      ASSESSMENT:   Response to treatment: Pt had good tolerance for today's PT session. Pt is progressing appropriately. Pt will continue to benefit from skilled PT to address aforementioned deficits.      PLAN/RECOMMENDATIONS:   Plan for treatment: therapy treatment to continue next visit.  Planned interventions for next visit: continue with current treatment, manual therapy (CPT 28268), therapeutic activities (CPT 90896), and therapeutic exercise (CPT 14685).         "

## 2023-09-01 ENCOUNTER — APPOINTMENT (OUTPATIENT)
Dept: PHYSICAL THERAPY | Facility: MEDICAL CENTER | Age: 78
End: 2023-09-01
Attending: NURSE PRACTITIONER
Payer: MEDICARE

## 2023-09-07 ENCOUNTER — OFFICE VISIT (OUTPATIENT)
Dept: MEDICAL GROUP | Facility: MEDICAL CENTER | Age: 78
End: 2023-09-07
Payer: MEDICARE

## 2023-09-07 VITALS
BODY MASS INDEX: 21.53 KG/M2 | RESPIRATION RATE: 17 BRPM | DIASTOLIC BLOOD PRESSURE: 60 MMHG | HEIGHT: 62 IN | SYSTOLIC BLOOD PRESSURE: 130 MMHG | WEIGHT: 117 LBS | TEMPERATURE: 97.4 F | OXYGEN SATURATION: 96 % | HEART RATE: 76 BPM

## 2023-09-07 DIAGNOSIS — Z63.4 BEREAVEMENT: ICD-10-CM

## 2023-09-07 DIAGNOSIS — R92.30 DENSE BREASTS: ICD-10-CM

## 2023-09-07 DIAGNOSIS — Z23 NEED FOR PNEUMOCOCCAL 20-VALENT CONJUGATE VACCINATION: ICD-10-CM

## 2023-09-07 DIAGNOSIS — H93.13 TINNITUS OF BOTH EARS: ICD-10-CM

## 2023-09-07 DIAGNOSIS — E78.5 HYPERLIPIDEMIA LDL GOAL <100: ICD-10-CM

## 2023-09-07 DIAGNOSIS — H81.10 BENIGN PAROXYSMAL POSITIONAL VERTIGO, UNSPECIFIED LATERALITY: ICD-10-CM

## 2023-09-07 DIAGNOSIS — R92.2 DENSE BREASTS: ICD-10-CM

## 2023-09-07 DIAGNOSIS — Z23 NEED FOR DIPHTHERIA-TETANUS-PERTUSSIS (TDAP) VACCINE: ICD-10-CM

## 2023-09-07 DIAGNOSIS — F41.9 ANXIETY: ICD-10-CM

## 2023-09-07 DIAGNOSIS — M15.9 PRIMARY OSTEOARTHRITIS INVOLVING MULTIPLE JOINTS: ICD-10-CM

## 2023-09-07 DIAGNOSIS — G47.33 OSA (OBSTRUCTIVE SLEEP APNEA): Chronic | ICD-10-CM

## 2023-09-07 DIAGNOSIS — Z86.018 HISTORY OF MENINGIOMA: ICD-10-CM

## 2023-09-07 PROCEDURE — 90715 TDAP VACCINE 7 YRS/> IM: CPT | Performed by: NURSE PRACTITIONER

## 2023-09-07 PROCEDURE — 90677 PCV20 VACCINE IM: CPT | Performed by: NURSE PRACTITIONER

## 2023-09-07 PROCEDURE — G0439 PPPS, SUBSEQ VISIT: HCPCS | Mod: 25 | Performed by: NURSE PRACTITIONER

## 2023-09-07 PROCEDURE — 3075F SYST BP GE 130 - 139MM HG: CPT | Performed by: NURSE PRACTITIONER

## 2023-09-07 PROCEDURE — 3078F DIAST BP <80 MM HG: CPT | Performed by: NURSE PRACTITIONER

## 2023-09-07 PROCEDURE — 90472 IMMUNIZATION ADMIN EACH ADD: CPT | Performed by: NURSE PRACTITIONER

## 2023-09-07 PROCEDURE — G0009 ADMIN PNEUMOCOCCAL VACCINE: HCPCS | Performed by: NURSE PRACTITIONER

## 2023-09-07 SDOH — SOCIAL STABILITY - SOCIAL INSECURITY: DISSAPEARANCE AND DEATH OF FAMILY MEMBER: Z63.4

## 2023-09-07 ASSESSMENT — ENCOUNTER SYMPTOMS: GENERAL WELL-BEING: EXCELLENT

## 2023-09-07 ASSESSMENT — PATIENT HEALTH QUESTIONNAIRE - PHQ9: CLINICAL INTERPRETATION OF PHQ2 SCORE: 0

## 2023-09-07 ASSESSMENT — ACTIVITIES OF DAILY LIVING (ADL): BATHING_REQUIRES_ASSISTANCE: 0

## 2023-09-07 ASSESSMENT — FIBROSIS 4 INDEX: FIB4 SCORE: 1.23

## 2023-09-07 NOTE — ASSESSMENT & PLAN NOTE
She has chronic constant left ear tinnitus.  No wax found with qtips in left but has some in rt.  Rt ear rare ringing.  No recent BPPV sx.

## 2023-09-07 NOTE — PROGRESS NOTES
Chief Complaint   Patient presents with    Annual Exam       HPI:  Hallie Fisher is a 78 y.o. here for Medicare Annual Wellness Visit   Reviewed lab with pt.  GFR, CMP, LP, CBC is wnl.    She has hx of dense breasts.  Would like to do sonocine.    Bereavement  She is still seeing a therapist.  She is feeling well most of the time.  She is only using valium 1/2 tab occas. She and her  feel that she is getting back to her normal self.    Osteoarthritis of multiple joints  She is still doing PT.  It has caused more pain both times that she went.  She is going to cancel further PT.  She is now doing better w/o PT.  No leg pain or numbness. 1-2 times daily she will have more pain.  She is still playing pickle ball.  She will consider physiatry referral if needed.     MONIK (obstructive sleep apnea)  Stable on CPAP.  She is going to try inspire when available if insurance will cover.      Tinnitus of both ears  She has chronic constant left ear tinnitus.  No wax found with qtips in left but has some in rt.  Rt ear rare ringing.  No recent BPPV sx.      Hyperlipidemia LDL goal <100  Stable on lipitor.  Stable on lipitor for chol.  She is on a mostly healthy diet. Eating lots of veggies and fruit.  Using 1/4 sugar and splenda with baking.      History of meningioma  MRI brain in 2019 did not show any meningioma.  No sx or tx needed    BPPV (benign paroxysmal positional vertigo)  No current sx    Anxiety  She is currently stable on meds and counseling.  She feels that later this year or early next year she may be able to wean off meds.       Patient Active Problem List    Diagnosis Date Noted    Tinnitus of both ears 09/07/2023    Anxiety 12/27/2022    Bereavement 11/08/2022    Osteoarthritis of multiple joints 08/29/2022    History of meningioma 11/26/2019    Anesthesia     Hyperlipidemia LDL goal <100 03/08/2017    Torn rotator cuff     BPPV (benign paroxysmal positional vertigo)     Closed fracture of left  radius with routine healing 09/04/2013    MONIK (obstructive sleep apnea) 08/19/2013       Current Outpatient Medications   Medication Sig Dispense Refill    sertraline (ZOLOFT) 50 MG Tab Take 1 Tablet by mouth every day. 90 Tablet 1    diazePAM (VALIUM) 5 MG Tab Take 1 Tablet by mouth every 24 hours as needed for Anxiety for up to 90 days. 30 tabs to last 90 days with one additional refill 30 Tablet 1    atorvastatin (LIPITOR) 10 MG Tab Take 1 Tablet by mouth every 48 hours. 45 Tablet 3    Multiple Vitamins-Minerals (CENTRUM SILVER) TABS Take  by mouth every day.      Magnesium 400 MG CAPS Take  by mouth every 48 hours as needed.      Calcium Carbonate-Vitamin D (CALCIUM 600 + D PO) Take  by mouth every day.      Cholecalciferol (VITAMIN D) 2000 UNITS CAPS Take  by mouth every day.       No current facility-administered medications for this visit.          Current supplements as per medication list.     Allergies: Penicillins    Current social contact/activities:    Pickle ball     She  reports that she has never smoked. She has never used smokeless tobacco. She reports current alcohol use. She reports that she does not use drugs.  Counseling given: Not Answered      ROS:    Gait: Uses no assistive device  Ostomy: No  Other tubes: No  Amputations: No  Chronic oxygen use: No  Last eye exam: 2022  Wears hearing aids: No   : Denies any urinary leakage during the last 6 months  Review of Systems   Constitutional: Negative.  Negative for fever, chills, weight loss, malaise/fatigue and diaphoresis.   HENT: Negative.  Negative for hearing loss, ear pain, nosebleeds, congestion, sore throat, neck pain, tinnitus and ear discharge.    Eyes: Negative.  Negative for blurred vision, double vision, photophobia, pain, discharge and redness.   Respiratory: Negative.  Negative for cough, hemoptysis, sputum production, shortness of breath, wheezing and stridor.    Cardiovascular: Negative.  Negative for chest pain, palpitations,  orthopnea, claudication, leg swelling and PND.   Gastrointestinal: Negative.  Negative for heartburn, nausea, vomiting, abdominal pain, constipation, blood in stool and melena. Chronic loose stools.   Genitourinary: Negative.  Negative for dysuria, urgency, frequency, incontinence, hematuria and flank pain.   Musculoskeletal: Negative.  Negative for myalgias, joint pain and falls.   Skin: Negative.  Negative for itching and rash.   Neurological: Negative.  Negative for dizziness, tingling, tremors, sensory change, speech change, focal weakness, seizures, loss of consciousness, weakness and headaches.   Endo/Heme/Allergies: Negative.  Negative for environmental allergies and polydipsia. Does not bruise/bleed easily.   Psychiatric/Behavioral: Negative.  Negative for depression, suicidal ideas, hallucinations, memory loss and substance abuse. The patient does not have insomnia.    All other systems reviewed and are negative.      Screening:  Tdap & prevnar 20  Depression Screening  Little interest or pleasure in doing things?  0 - not at all  Feeling down, depressed , or hopeless? 0 - not at all  Patient Health Questionnaire Score: 0     If depressive symptoms identified deferred to follow up visit unless specifically addressed in assessment and plan.    Interpretation of PHQ-9 Total Score   Score Severity   1-4 No Depression   5-9 Mild Depression   10-14 Moderate Depression   15-19 Moderately Severe Depression   20-27 Severe Depression    Screening for Cognitive Impairment  Do you or any of your friends or family members have any concern about your memory? No  Three Minute Recall (Banana, Sunrise, Chair) 3/3    Tristin clock face with all 12 numbers and set the hands to show 20 past 8.  Yes    Cognitive concerns identified deferred for follow up unless specifically addressed in assessment and plan.    Fall Risk Assessment  Has the patient had two or more falls in the last year or any fall with injury in the last year?   No    Safety Assessment  Do you always wear your seatbelt?  Yes  Any changes to home needed to function safely? No  Difficulty hearing.  No  Patient counseled about all safety risks that were identified.    Functional Assessment ADLs  Are there any barriers preventing you from cooking for yourself or meeting nutritional needs?  No.    Are there any barriers preventing you from driving safely or obtaining transportation?  No.    Are there any barriers preventing you from using a telephone or calling for help?  No    Are there any barriers preventing you from shopping?  No.    Are there any barriers preventing you from taking care of your own finances?  No    Are there any barriers preventing you from managing your medications?  No    Are there any barriers preventing you from showering, bathing or dressing yourself? No    Are there any barriers preventing you from doing housework or laundry? No  Are there any barriers preventing you from using the toilet?No  Are you currently engaging in any exercise or physical activity?  Yes.      Self-Assessment of Health  What is your perception of your health? Excellent  Do you sleep more than six hours a night? Yes  In the past 7 days, how much did pain keep you from doing your normal work? None  Do you spend quality time with family or friends (virtually or in person)? Yes  Do you usually eat a heart healthy diet that constists of a variety of fruits, vegetables, whole grains and fiber? Yes  Do you eat foods high in fat and/or Fast Food more than three times per week? No    Advance Care Planning  Do you have an Advance Directive, Living Will, Durable Power of , or POLST? Yes  Advance Directive       is not on file - instructed patient to bring in a copy to scan into their chart      Health Maintenance Summary            Overdue - Hepatitis C Screening (Once) Overdue - never done      No completion history exists for this topic.              Overdue - Influenza Vaccine  (1) Overdue since 9/1/2023      10/04/2022  Imm Admin: Influenza, Unspecified - HISTORICAL DATA    10/04/2021  Imm Admin: Influenza Vaccine Adult HD    09/09/2020  Imm Admin: Influenza Vaccine Adult HD    09/08/2020  Imm Admin: Influenza Vaccine Adult HD    09/17/2019  Imm Admin: Influenza Vaccine Adult HD    Only the first 5 history entries have been loaded, but more history exists.              Annual Wellness Visit (Every 366 Days) Next due on 9/7/2024 09/07/2023  Done    08/29/2022  Done    08/26/2021  Subsequent Annual Wellness Visit - Includes PPPS ()    07/15/2020  Subsequent Annual Wellness Visit - Includes PPPS ()    02/20/2018  Done    Only the first 5 history entries have been loaded, but more history exists.              Bone Density Scan (Every 2 Years) Next due on 10/11/2024      10/11/2022  DS-BONE DENSITY STUDY (DEXA)    07/28/2020  DS-BONE DENSITY STUDY (DEXA)    11/06/2015  DS-BONE DENSITY STUDY (DEXA)    05/11/2012  DS-BONE DENSITY STUDY (DEXA)              IMM DTaP/Tdap/Td Vaccine (3 - Td or Tdap) Next due on 9/7/2033 09/07/2023  Imm Admin: Tdap Vaccine    08/19/2013  Imm Admin: Tdap Vaccine              COVID-19 Vaccine (Series Information) Completed      05/02/2023  Imm Admin: MODERNA BIVALENT BOOSTER SARS-COV-2 VACCINE (6+)    10/04/2022  Imm Admin: PFIZER BIVALENT SARS-COV-2 VACCINE (12+)    04/15/2022  Imm Admin: PFIZER OROZCO CAP SARS-COV-2 VACCINATION (12+)    09/28/2021  Imm Admin: PFIZER PURPLE CAP SARS-COV-2 VACCINATION (12+)    02/18/2021  Imm Admin: PFIZER PURPLE CAP SARS-COV-2 VACCINATION (12+)    Only the first 5 history entries have been loaded, but more history exists.              Zoster (Shingles) Vaccines (Series Information) Completed      08/11/2023  Imm Admin: Zoster Vaccine Recombinant (RZV) (SHINGRIX)    05/22/2023  Imm Admin: Zoster Vaccine Recombinant (RZV) (SHINGRIX)    05/23/2014  Imm Admin: Zoster Vaccine Live (ZVL) (Zostavax) - HISTORICAL DATA               Pneumococcal Vaccine: 65+ Years (Series Information) Completed      09/07/2023  Imm Admin: Pneumococcal Conjugate Vaccine (PCV20)    02/19/2016  Imm Admin: Pneumococcal Conjugate Vaccine (Prevnar/PCV-13)    08/19/2013  Imm Admin: Pneumococcal polysaccharide vaccine (PPSV-23)              Hepatitis A Vaccine (Hep A) (Series Information) Aged Out      No completion history exists for this topic.              Hepatitis B Vaccine (Hep B) (Series Information) Aged Out      No completion history exists for this topic.              HPV Vaccines (Series Information) Aged Out      No completion history exists for this topic.              Polio Vaccine (Inactivated Polio) (Series Information) Aged Out      No completion history exists for this topic.              IMM MENINGOCOCCAL ACWY VACCINE (Series Information) Aged Out      No completion history exists for this topic.              Discontinued - Mammogram  Discontinued        Frequency changed to Never automatically (Topic No Longer Applies)    09/02/2020  MA-SCREENING MAMMO BILAT W/TOMOSYNTHESIS W/CAD    07/02/2019  MA-SCREENING MAMMO BILAT W/TOMOSYNTHESIS W/CAD    03/23/2017  MA-MAMMO SCREENING BILAT W/JOVANY W/CAD    11/06/2015  MA-SCREEN MAMMO W/CAD-BILAT    Only the first 5 history entries have been loaded, but more history exists.              Discontinued - Cervical Cancer Screening  Discontinued        Frequency changed to Never automatically (Topic No Longer Applies)    11/15/2012  PAP IG, RFX HPV ASCU    10/02/2009  PAP IG, RFX HPV ASCU              Discontinued - Colorectal Cancer Screening  Discontinued        Frequency changed to Never automatically (Topic No Longer Applies)    09/14/2022  COLOGUARD COLON CANCER SCREENING    09/14/2022  COLOGUARD COLON CANCER SCREENING    07/12/2019  COLOGUARD COLON CANCER SCREENING    07/12/2019  COLOGUARD COLON CANCER SCREENING    Only the first 5 history entries have been loaded, but more history exists.                     Patient Care Team:  BLAKE Cadena as PCP - General (Family Medicine)  BLAKE Cadena as PCP - [facility] Paneled  [facility] as Home Health Provider (DME Supplier)  Katherine Martinez as [facility]         Social History     Tobacco Use    Smoking status: Never    Smokeless tobacco: Never   Vaping Use    Vaping Use: Never used   Substance Use Topics    Alcohol use: Yes     Comment: one per week or once a month     Drug use: No     Family History   Problem Relation Age of Onset    Lung Disease Mother         emphysema    Alcohol/Drug Mother     Alcohol/Drug Father     Stroke Brother      She  has a past medical history of Anesthesia, Anxiety (12/27/2022), Bereavement (11/8/2022), BPPV (benign paroxysmal positional vertigo), Closed fracture of radius, History of meningioma (11/26/2019), Hyperlipidemia, Osteoarthritis of multiple joints (8/29/2022), Sleep apnea, Tinnitus of both ears (9/7/2023), and Torn rotator cuff.   Past Surgical History:   Procedure Laterality Date    VA TOTAL HIP ARTHROPLASTY Left 5/9/2022    Procedure: LEFT ANTERIOR TOTAL HIP ARTHROPLASTY;  Surgeon: Juwan Espino M.D.;  Location: [facility];  Service: Orthopedics    SHOULDER DECOMPRESSION ARTHROSCOPIC Right 3/1/2016    Procedure: SHOULDER DECOMPRESSION ARTHROSCOPIC - SUBACROMIAL, LABRAL DEBRIDMENT;  Surgeon: Shama Nelson M.D.;  Location: [facility];  Service:     SHOULDER ARTHROSCOPY W/ ROTATOR CUFF REPAIR Right 3/1/2016    Procedure: SHOULDER ARTHROSCOPY W/ ROTATOR CUFF REPAIR;  Surgeon: Shama Nelson M.D.;  Location: [facility];  Service:     ORIF, WRIST  9/4/2013    Performed by Prasanna Anthony M.D. at [facility]    CARPAL TUNNEL RELEASE  9/4/2013    Performed by Prasanna Anthony M.D. at [facility]    KNEE ARTHROSCOPY Left 2009     - Dr. Newman; left    LUMPECTOMY Right 1970's    right breast cyst removal     "TONSILLECTOMY  as child       Exam:   /60 (BP Location: Right arm, Patient Position: Sitting, BP Cuff Size: Adult)   Pulse 76   Temp 36.3 °C (97.4 °F) (Temporal)   Resp 17   Ht 1.575 m (5' 2\")   Wt 53.1 kg (117 lb)   SpO2 96%  Body mass index is 21.4 kg/m².  Hearing good.    Dentition good  Alert, oriented in no acute distress.  Eye contact is good, speech goal directed, affect calm  Physical Exam   Vitals reviewed.  Constitutional: oriented to person, place, and time. appears well-developed and well-nourished. No distress.   HENT: Head: Normocephalic and atraumatic. Bilateral tympanic membranes wnl w/o bulging.  Right Ear: External ear normal. Left Ear: External ear normal. Nose: Nose normal.  Mouth/Throat: Oropharynx is clear and moist. No oropharyngeal exudate. nedra tm wnl. Eyes: Conjunctivae and EOM are normal. Pupils are equal, round, and reactive to light. Right eye exhibits no discharge. Left eye exhibits no discharge. No scleral icterus.    Neck: Normal range of motion. Neck supple. No JVD present.   Cardiovascular: Normal rate, regular rhythm, normal heart sounds and intact distal pulses.  Exam reveals no gallop and no friction rub.  No murmur heard.  No carotid bruits   Pulmonary/Chest: Effort normal and breath sounds normal. No stridor. No respiratory distress. no wheezes or rales. exhibits no tenderness.   Abdominal: Soft. Bowel sounds are normal. exhibits no distension and no mass. No tenderness. no rebound and no guarding.   Musculoskeletal: Normal range of motion. exhibits no edema or tenderness.  nedra pedal pulses 2+.  Lymphadenopathy:  no cervical or supraclavicular adenopathy.   Neurological: alert and oriented to person, place, and time. has normal reflexes. displays normal reflexes. No cranial nerve deficit. exhibits normal muscle tone. Coordination normal.   Skin: Skin is warm and dry. No rash noted. no diaphoresis. No erythema. No pallor.   Psychiatric: normal mood and affect. " behavior is normal.       Assessment and Plan. The following treatment and monitoring plan is recommended:    1. Bereavement      sx are improving.  continue meds for now. she will f/u when she would like to start weaning off meds      2. Hyperlipidemia LDL goal <100      stable on lipitor, healthy diet and regular exercise.  plan: mark lab and f/u for review 1 yr.      3. Primary osteoarthritis involving multiple joints      having low back and left hip pain that got worse with PT.  will dc PT and continue regular exercise.       4. MONIK (obstructive sleep apnea)      stable on CPAP. considering inspire when available      5. Dense breasts  US-SCREENING WHOLE BREAST BILATERAL (3D SCREENING)    sonocine ordered      6. Tinnitus of both ears      sx worse on left ear. no tx needed at this time.       7. History of meningioma      last MRI brain showed resolution of mass       8. Benign paroxysmal positional vertigo, unspecified laterality      no sx or tx needed      9. Anxiety      stable on meds and counseling      10. Need for diphtheria-tetanus-pertussis (Tdap) vaccine  Tdap =>8yo IM    Tdap given today      11. Need for pneumococcal 20-valent conjugate vaccination  Pneumococcal Conjugate Vaccine 20-Valent (19 yrs+)    prevnar 20 given today            Services suggested: none  Health Care Screening: Age-appropriate preventive services recommended by USPTF and ACIP covered by Medicare were discussed today. Services ordered if indicated and agreed upon by the patient.  Referrals offered: Community-based lifestyle interventions to reduce health risks and promote self-management and wellness, fall prevention, nutrition, physical activity, tobacco-use cessation, weight loss, and mental health services as per orders if indicated.    Discussion today about general wellness and lifestyle habits:    Prevent falls and reduce trip hazards; Cautioned about securing or removing rugs.  Have a working fire alarm and carbon  monoxide detector;   Engage in regular physical activity and social activities     Follow-up: Return in about 1 year (around 9/7/2024), or call for lab slip.

## 2023-09-07 NOTE — ASSESSMENT & PLAN NOTE
Stable on lipitor.  Stable on lipitor for chol.  She is on a mostly healthy diet. Eating lots of veggies and fruit.  Using 1/4 sugar and splenda with baking.

## 2023-09-07 NOTE — ASSESSMENT & PLAN NOTE
She is still seeing a therapist.  She is feeling well most of the time.  She is only using valium 1/2 tab occas. She and her  feel that she is getting back to her normal self.

## 2023-09-07 NOTE — ASSESSMENT & PLAN NOTE
She is still doing PT.  It has caused more pain both times that she went.  She is going to cancel further PT.  She is now doing better w/o PT.  No leg pain or numbness. 1-2 times daily she will have more pain.  She is still playing pickle ball.  She will consider physiatry referral if needed.

## 2023-09-10 NOTE — ASSESSMENT & PLAN NOTE
She is currently stable on meds and counseling.  She feels that later this year or early next year she may be able to wean off meds.

## 2023-09-21 ENCOUNTER — PHARMACY VISIT (OUTPATIENT)
Dept: PHARMACY | Facility: MEDICAL CENTER | Age: 78
End: 2023-09-21
Payer: COMMERCIAL

## 2023-09-21 PROCEDURE — RXMED WILLOW AMBULATORY MEDICATION CHARGE: Performed by: INTERNAL MEDICINE

## 2023-09-21 RX ORDER — INFLUENZA A VIRUS A/MICHIGAN/45/2015 X-275 (H1N1) ANTIGEN (FORMALDEHYDE INACTIVATED), INFLUENZA A VIRUS A/SINGAPORE/INFIMH-16-0019/2016 IVR-186 (H3N2) ANTIGEN (FORMALDEHYDE INACTIVATED), INFLUENZA B VIRUS B/PHUKET/3073/2013 ANTIGEN (FORMALDEHYDE INACTIVATED), AND INFLUENZA B VIRUS B/MARYLAND/15/2016 BX-69A ANTIGEN (FORMALDEHYDE INACTIVATED) 60; 60; 60; 60 UG/.7ML; UG/.7ML; UG/.7ML; UG/.7ML
0.7 INJECTION, SUSPENSION INTRAMUSCULAR
Qty: 0.7 ML | Refills: 0 | OUTPATIENT
Start: 2023-09-21

## 2023-09-27 ENCOUNTER — APPOINTMENT (OUTPATIENT)
Dept: PHYSICAL THERAPY | Facility: MEDICAL CENTER | Age: 78
End: 2023-09-27
Payer: MEDICARE

## 2023-10-04 ENCOUNTER — APPOINTMENT (OUTPATIENT)
Dept: PHYSICAL THERAPY | Facility: MEDICAL CENTER | Age: 78
End: 2023-10-04
Attending: NURSE PRACTITIONER
Payer: MEDICARE

## 2023-10-10 ENCOUNTER — APPOINTMENT (OUTPATIENT)
Dept: PHYSICAL THERAPY | Facility: MEDICAL CENTER | Age: 78
End: 2023-10-10
Attending: NURSE PRACTITIONER
Payer: MEDICARE

## 2023-10-11 ENCOUNTER — HOSPITAL ENCOUNTER (OUTPATIENT)
Dept: RADIOLOGY | Facility: MEDICAL CENTER | Age: 78
End: 2023-10-11
Attending: NURSE PRACTITIONER
Payer: MEDICARE

## 2023-10-11 DIAGNOSIS — R92.30 DENSE BREASTS: ICD-10-CM

## 2023-10-11 DIAGNOSIS — R92.2 DENSE BREASTS: ICD-10-CM

## 2023-10-11 PROCEDURE — 76641 ULTRASOUND BREAST COMPLETE: CPT

## 2023-10-16 ENCOUNTER — HOSPITAL ENCOUNTER (OUTPATIENT)
Dept: RADIOLOGY | Facility: MEDICAL CENTER | Age: 78
End: 2023-10-16
Attending: NURSE PRACTITIONER
Payer: MEDICARE

## 2023-10-16 DIAGNOSIS — R92.8 ABNORMAL MAMMOGRAM: ICD-10-CM

## 2023-10-16 PROCEDURE — G0279 TOMOSYNTHESIS, MAMMO: HCPCS

## 2023-10-16 PROCEDURE — 76642 ULTRASOUND BREAST LIMITED: CPT | Mod: LT

## 2023-12-25 DIAGNOSIS — F41.9 ANXIETY: ICD-10-CM

## 2023-12-26 NOTE — TELEPHONE ENCOUNTER
Received request via: Pharmacy    Was the patient seen in the last year in this department? Yes    Does the patient have an active prescription (recently filled or refills available) for medication(s) requested? yes    Does the patient have halfway Plus and need 100 day supply (blood pressure, diabetes and cholesterol meds only)? Medication is not for cholesterol, blood pressure or diabetes  Requested Prescriptions     Pending Prescriptions Disp Refills    sertraline (ZOLOFT) 50 MG Tab [Pharmacy Med Name: SERTRALINE HCL 50 MG TABLET] 90 Tablet 1     Sig: TAKE 1 TABLET BY MOUTH EVERY DAY

## 2024-01-22 ENCOUNTER — TELEMEDICINE (OUTPATIENT)
Dept: SLEEP MEDICINE | Facility: MEDICAL CENTER | Age: 79
End: 2024-01-22
Attending: INTERNAL MEDICINE
Payer: MEDICARE

## 2024-01-22 VITALS — BODY MASS INDEX: 21.35 KG/M2 | WEIGHT: 116 LBS | HEIGHT: 62 IN

## 2024-01-22 DIAGNOSIS — G47.33 OSA (OBSTRUCTIVE SLEEP APNEA): Chronic | ICD-10-CM

## 2024-01-22 PROCEDURE — 99213 OFFICE O/P EST LOW 20 MIN: CPT | Performed by: INTERNAL MEDICINE

## 2024-01-22 ASSESSMENT — PATIENT HEALTH QUESTIONNAIRE - PHQ9: CLINICAL INTERPRETATION OF PHQ2 SCORE: 0

## 2024-01-22 ASSESSMENT — ENCOUNTER SYMPTOMS
CONSTITUTIONAL NEGATIVE: 1
CARDIOVASCULAR NEGATIVE: 1
GASTROINTESTINAL NEGATIVE: 1
RESPIRATORY NEGATIVE: 1
PSYCHIATRIC NEGATIVE: 1
MUSCULOSKELETAL NEGATIVE: 1
NEUROLOGICAL NEGATIVE: 1
EYES NEGATIVE: 1

## 2024-01-22 ASSESSMENT — FIBROSIS 4 INDEX: FIB4 SCORE: 1.23

## 2024-01-22 NOTE — PROGRESS NOTES
Telemedicine: Established Patient   This evaluation was conducted via Zoom using secure and encrypted videoconferencing technology. The patient was in their home in the [facility].    The patient's identity was confirmed and verbal consent was obtained for this virtual visit.    Subjective:   CC:   Chief Complaint   Patient presents with    Follow-Up     LAST SEEN 01/09/2022 BETH XIONG  1YR. F/V MONIK  auto CPAP 6 to 12 cm nightly.       Hallie Fisher is a 78 y.o. female presenting for evaluation and management of MONIK:    Currently using APAP 6-12cm; RESPIRONICS DREAMSTATION 2 OBTAINED 2022 FROM RECALL.  she notes always using a device every night.       Occasional dry mouth   Review of Systems   Constitutional: Negative.    HENT: Negative.     Eyes: Negative.    Respiratory: Negative.     Cardiovascular: Negative.    Gastrointestinal: Negative.    Genitourinary: Negative.    Musculoskeletal: Negative.    Skin: Negative.    Neurological: Negative.    Endo/Heme/Allergies: Negative.    Psychiatric/Behavioral: Negative.           Allergies   Allergen Reactions    Penicillins Hives       Current medicines (including changes today)  Current Outpatient Medications   Medication Sig Dispense Refill    atorvastatin (LIPITOR) 10 MG Tab TAKE 1 TABLET BY MOUTH EVERY 48 HOURS. 50 Tablet 3    influenza Vac High-Dose Quad (FLUZONE HIGH-DOSE QUADRIVALENT) 0.7 ML Suspension Prefilled Syringe injection Inject 0.7 mL into the shoulder, thigh, or buttocks. 0.7 mL 0    COVID-19mRNA J Carlos-S Vac 13yo or older Pfizer (CONFIRNATY) 30 MCG/0.3ML Suspension injection Inject 0.3 mL into the shoulder, thigh, or buttocks. 0.3 mL 0    Multiple Vitamins-Minerals (CENTRUM SILVER) TABS Take  by mouth every day.      Magnesium 400 MG CAPS Take  by mouth every 48 hours as needed.      Calcium Carbonate-Vitamin D (CALCIUM 600 + D PO) Take  by mouth every day.      Cholecalciferol (VITAMIN D) 2000 UNITS CAPS Take  by mouth every day.       "sertraline (ZOLOFT) 50 MG Tab TAKE 1 TABLET BY MOUTH EVERY  Tablet 2     No current facility-administered medications for this visit.       Patient Active Problem List    Diagnosis Date Noted    Tinnitus of both ears 09/07/2023    Anxiety 12/27/2022    Bereavement 11/08/2022    Osteoarthritis of multiple joints 08/29/2022    History of meningioma 11/26/2019    Anesthesia     Hyperlipidemia LDL goal <100 03/08/2017    Torn rotator cuff     BPPV (benign paroxysmal positional vertigo)     Closed fracture of left radius with routine healing 09/04/2013    MONIK (obstructive sleep apnea) 08/19/2013       Family History   Problem Relation Age of Onset    Lung Disease Mother         emphysema    Alcohol/Drug Mother     Alcohol/Drug Father     Stroke Brother        She  has a past medical history of Anesthesia, Anxiety (12/27/2022), Bereavement (11/8/2022), BPPV (benign paroxysmal positional vertigo), Closed fracture of radius, History of meningioma (11/26/2019), Hyperlipidemia, Osteoarthritis of multiple joints (8/29/2022), Sleep apnea, Tinnitus of both ears (9/7/2023), and Torn rotator cuff.  She  has a past surgical history that includes tonsillectomy (as child); knee arthroscopy (Left, 2009); orif, wrist (9/4/2013); carpal tunnel release (9/4/2013); lumpectomy (Right, 1970's); shoulder decompression arthroscopic (Right, 3/1/2016); shoulder arthroscopy w/ rotator cuff repair (Right, 3/1/2016); and pr total hip arthroplasty (Left, 5/9/2022).       Objective:   Ht 1.575 m (5' 2\")   Wt 52.6 kg (116 lb)   BMI 21.22 kg/m²     Physical Exam  Unable to see pt as not on video   Assessment and Plan:   The following treatment plan was discussed:     1. MONIK (obstructive sleep apnea)  Severe sleep apnea with AHI 41.7 O2 javier 83%  The pathophysiology of sleep anea and the increased risk of cardiovascular morbidity from untreated sleep apnea is discussed in detail with the patient.       Patient is urged to avoid weight gain " and alcoholic beverages since all of these can worsen sleep apnea. Patient is cautioned against drowsy driving. If patient feels sleepy while driving, patient must pull over for a break/nap, rather than persist on the road, in the interest of their own safety and that of others on the road.              Plan              - Continue                - compliance download was reviewed and discussed with the pt   - Patient is compliant              - compliance was reinforced     Pt would like some information on INSPIRE  We will mail her some information  After the telemedicine appt the cutoff of severity was reviewed and pt does meet criteria as cut off now AHI of 100        Follow-up: Return in about 1 year (around 1/22/2025) for with compliance reprt.

## 2024-01-23 ENCOUNTER — APPOINTMENT (OUTPATIENT)
Dept: SLEEP MEDICINE | Facility: MEDICAL CENTER | Age: 79
End: 2024-01-23
Attending: NURSE PRACTITIONER
Payer: MEDICARE

## 2024-04-02 NOTE — PROGRESS NOTES
Subjective:     Hallie Fisher is a 77 y.o. female who presents with anxiety.    HPI:   Seen in f/u for anxiety.  When she was last seen she was very concerned about her dog.  Since then she had to put her dog to sleep.    She is still very upset about loosing her dog. She went to see a counselor but the counselor was not helpful.    She continues to have significant anxiety.  She is about to loose several family members.  She is having multiple stressors that have impacted her.  Her family member is withher today. She lost a brother to suicide.  She is worried about politics.  Her cousin and aunt are both ill and not doing well.  She is getting more paranoid.  She is locking the doors repeatedly. She worries about her  passing.  She is getting very insecure.  She has lost her happiness.  She denies SI.    She is using valium prn now for extreme anxiety and panic attacks.  She needs refill of valium for occas use.    Patient Active Problem List    Diagnosis Date Noted    Anxiety 12/27/2022    Bereavement 11/08/2022    Osteoarthritis of multiple joints 08/29/2022    History of meningioma 11/26/2019    Anesthesia     Hyperlipidemia LDL goal <100 03/08/2017    Torn rotator cuff     BPPV (benign paroxysmal positional vertigo)     Closed fracture of left radius with routine healing 09/04/2013    MONIK (obstructive sleep apnea) 08/19/2013       Current medicines (including changes today)  Current Outpatient Medications   Medication Sig Dispense Refill    sertraline (ZOLOFT) 25 MG tablet Take 1 Tablet by mouth every day. 30 Tablet 1    diazePAM (VALIUM) 5 MG Tab Take 1 Tablet by mouth every 12 hours as needed for Anxiety for up to 30 days. 30 Tablet 1    atorvastatin (LIPITOR) 10 MG Tab Take 1 Tablet by mouth every 48 hours. 45 Tablet 3    Multiple Vitamins-Minerals (CENTRUM SILVER) TABS Take  by mouth every day.      Magnesium 400 MG CAPS Take  by mouth every 48 hours as needed.      Calcium Carbonate-Vitamin D  See triage.  Michelle Salamanca RN on 4/2/2024 at 3:15 PM     "(CALCIUM 600 + D PO) Take  by mouth every day.      Cholecalciferol (VITAMIN D) 2000 UNITS CAPS Take  by mouth every day.       No current facility-administered medications for this visit.       Allergies   Allergen Reactions    Penicillins Hives       ROS  Constitutional: Negative. Negative for fever, chills, weight loss, malaise/fatigue and diaphoresis.   HENT: Negative. Negative for hearing loss, ear pain, nosebleeds, congestion, sore throat, neck pain, tinnitus and ear discharge.   Respiratory: Negative. Negative for cough, hemoptysis, sputum production, shortness of breath, wheezing and stridor.   Cardiovascular: Negative. Negative for chest pain, palpitations, orthopnea, claudication, leg swelling and PND.   Gastrointestinal: Denies nausea, vomiting, diarrhea, constipation, heartburn, melena or hematochezia.  Genitourinary: Denies dysuria, hematuria, urinary incontinence, frequency or urgency.        Objective:     /60   Pulse 75   Temp 36.7 °C (98.1 °F) (Temporal)   Resp 16   Ht 1.549 m (5' 1\")   Wt 54 kg (119 lb)   SpO2 95%  Body mass index is 22.48 kg/m².    Physical Exam:  Vitals reviewed.  Constitutional: Oriented to person, place, and time. appears well-developed and well-nourished. No distress.   Cardiovascular: Normal rate, regular rhythm, normal heart sounds and intact distal pulses. Exam reveals no gallop and no friction rub. No murmur heard. No carotid bruits.   Pulmonary/Chest: Effort normal and breath sounds normal. No stridor. No respiratory distress. no wheezes or rales. exhibits no tenderness.   Musculoskeletal: Normal range of motion. exhibits no edema. nedra pedal pulses 2+.  Lymphadenopathy: No cervical or supraclavicular adenopathy.   Neurological: Alert and oriented to person, place, and time. exhibits normal muscle tone.  Skin: Skin is warm and dry. No diaphoresis.   Psychiatric: Normal mood and affect. Behavior is normal.      Assessment and Plan:     The following treatment " plan was discussed:    1. Anxiety  Referral to Psychology    Referral to Psychiatry    sertraline (ZOLOFT) 25 MG tablet    TSH WITH REFLEX TO FT4    VITAMIN D,25 HYDROXY (DEFICIENCY)    VITAMIN B12    FOLATE    diazePAM (VALIUM) 5 MG Tab    refer urgently to psychology and psychiatry.  try zoloft.  RF valium for prn use until sees psych.  f/u 1 mo for sx eval unless seeing psych already            Followup: Return in about 4 weeks (around 2/2/2023), or unless already seeing psych.  then.

## 2024-04-13 NOTE — ASSESSMENT & PLAN NOTE
Healed with plate in place.  No pain  
No recent attacks but keeps meclizine with her just in case.    
She is stable on med.  She is due updated CMP, LP.    
Stable on CPAP.  Followed by sleep clinic  
This is chronic and long term. 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  No headaches or other sx  
Dementia

## 2024-04-15 ENCOUNTER — OFFICE VISIT (OUTPATIENT)
Dept: URGENT CARE | Facility: CLINIC | Age: 79
End: 2024-04-15
Payer: MEDICARE

## 2024-04-15 VITALS
DIASTOLIC BLOOD PRESSURE: 60 MMHG | HEART RATE: 86 BPM | OXYGEN SATURATION: 96 % | RESPIRATION RATE: 16 BRPM | HEIGHT: 62 IN | WEIGHT: 123 LBS | BODY MASS INDEX: 22.63 KG/M2 | TEMPERATURE: 97.9 F | SYSTOLIC BLOOD PRESSURE: 122 MMHG

## 2024-04-15 DIAGNOSIS — H01.115 ALLERGIC DERMATITIS OF UPPER AND LOWER LIDS OF BOTH EYES: ICD-10-CM

## 2024-04-15 DIAGNOSIS — H01.111 ALLERGIC DERMATITIS OF UPPER AND LOWER LIDS OF BOTH EYES: ICD-10-CM

## 2024-04-15 DIAGNOSIS — H01.114 ALLERGIC DERMATITIS OF UPPER AND LOWER LIDS OF BOTH EYES: ICD-10-CM

## 2024-04-15 DIAGNOSIS — H01.112 ALLERGIC DERMATITIS OF UPPER AND LOWER LIDS OF BOTH EYES: ICD-10-CM

## 2024-04-15 PROCEDURE — 3074F SYST BP LT 130 MM HG: CPT | Performed by: NURSE PRACTITIONER

## 2024-04-15 PROCEDURE — 3078F DIAST BP <80 MM HG: CPT | Performed by: NURSE PRACTITIONER

## 2024-04-15 PROCEDURE — 99213 OFFICE O/P EST LOW 20 MIN: CPT | Performed by: NURSE PRACTITIONER

## 2024-04-15 RX ORDER — TRIAMCINOLONE ACETONIDE 0.25 MG/G
1 CREAM TOPICAL 2 TIMES DAILY
Qty: 80 G | Refills: 1 | Status: SHIPPED | OUTPATIENT
Start: 2024-04-15 | End: 2024-04-25

## 2024-04-15 RX ORDER — PREDNISONE 20 MG/1
20 TABLET ORAL
Qty: 5 TABLET | Refills: 0 | Status: SHIPPED | OUTPATIENT
Start: 2024-04-15 | End: 2024-04-20

## 2024-04-15 ASSESSMENT — VISUAL ACUITY: OU: 1

## 2024-04-15 ASSESSMENT — FIBROSIS 4 INDEX: FIB4 SCORE: 1.24

## 2024-04-15 NOTE — PROGRESS NOTES
Date: 04/15/24        Chief Complaint   Patient presents with    Eye Problem     Red itchy eyes patient states, both eyes, is using new makeup and both eyes did get red and irritated, swollen x 3-4 days        History of Present Illness: 79 y.o.  female presents to clinic with 3 to 4 days of upper and lower eyelid itching and redness and swelling.  She states she did use a new make-up about a month ago but her symptoms started 3 to 4 days ago.  She admits to mild rhinorrhea but no overt allergy like symptoms.  No shortness of breath or difficulty breathing.  No mouth or throat swelling.  She denies any eyeball irritation.  She presents to clinic for evaluation.  No fever or bodyaches.      ROS:    No severe shortness of breath   No Cardiac like chest pain, as discussed   As otherwise stated in HPI    Medical/SX/ Social History:  Reviewed per chart    Pertinent Medications:    Current Outpatient Medications on File Prior to Visit   Medication Sig Dispense Refill    sertraline (ZOLOFT) 50 MG Tab TAKE 1 TABLET BY MOUTH EVERY  Tablet 2    atorvastatin (LIPITOR) 10 MG Tab TAKE 1 TABLET BY MOUTH EVERY 48 HOURS. 50 Tablet 3    influenza Vac High-Dose Quad (FLUZONE HIGH-DOSE QUADRIVALENT) 0.7 ML Suspension Prefilled Syringe injection Inject 0.7 mL into the shoulder, thigh, or buttocks. 0.7 mL 0    COVID-19mRNA J Carlos-S Vac 13yo or older Pfizer (CONFIRNATY) 30 MCG/0.3ML Suspension injection Inject 0.3 mL into the shoulder, thigh, or buttocks. 0.3 mL 0    Multiple Vitamins-Minerals (CENTRUM SILVER) TABS Take  by mouth every day.      Magnesium 400 MG CAPS Take  by mouth every 48 hours as needed.      Calcium Carbonate-Vitamin D (CALCIUM 600 + D PO) Take  by mouth every day.      Cholecalciferol (VITAMIN D) 2000 UNITS CAPS Take  by mouth every day.       No current facility-administered medications on file prior to visit.        Allergies:    Penicillins     Problem list, medications, and allergies reviewed by myself  today in Epic     Physical Exam:    Vitals:    04/15/24 1519   BP: 122/60   Pulse: 86   Resp: 16   Temp: 36.6 °C (97.9 °F)   SpO2: 96%             Physical Exam  Constitutional:       General: She is not in acute distress.     Appearance: Normal appearance. She is not ill-appearing, toxic-appearing or diaphoretic.   HENT:      Head: Normocephalic and atraumatic.      Nose: Nose normal.      Mouth/Throat:      Lips: Pink.      Mouth: Mucous membranes are moist.      Pharynx: Oropharynx is clear.   Eyes:      General: Vision grossly intact. No allergic shiner or scleral icterus.     Extraocular Movements: Extraocular movements intact.      Conjunctiva/sclera: Conjunctivae normal.      Comments: Rash noted to bilateral upper and lower lids.  Dry and flaky in nature.  Blanchable.  No warmth noted, none tender   Neurological:      Mental Status: She is alert.                Medical Decision making and plan :  I personally reviewed prior external notes and test results pertinent to today's visit. Pt is clinically stable at today's acute urgent care visit.  Patient appears nontoxic with no acute distress noted. Appropriate for outpatient care at this time.      Pleasant 79 y.o. female presented clinic with HPI exam endings was consistent with allergic dermatitis of the upper and lower eyelids.  We will do a low-dose Kenalog.  She will use this for 2 to 3 days if she notes no improvement she may do prednisone for 5 days.  Advised daily Claritin or Zyrtec.  Moisturizing not frequent moisturizer.    1. Allergic dermatitis of upper and lower lids of both eyes    - triamcinolone acetonide (KENALOG) 0.025 % Cream; Apply 1 Application topically 2 times a day for 10 days.  Dispense: 80 g; Refill: 1  - predniSONE (DELTASONE) 20 MG Tab; Take 1 Tablet by mouth every morning before breakfast for 5 days.  Dispense: 5 Tablet; Refill: 0       Shared decision-making was utilized with patient for treatment plan. Medication discussed  included indication for use and the potential benefits and side effects. Education was provided regarding the aforementioned assessments.  Differential Diagnosis, natural history, and supportive care discussed. All of the patient's questions were answered to their satisfaction at the time of discharge. Patient was encouraged to monitor symptoms closely. Those signs and symptoms which would warrant concern and mandate seeking a higher level of service through the emergency department discussed at length.  Patient stated agreement and understanding of this plan of care.    Disposition:  Home in stable condition       Voice Recognition Disclaimer:  Portions of this document were created using voice recognition software. The software does have a chance of producing errors of grammar and possibly content. I have made every reasonable attempt to correct obvious errors, but there may be errors of grammar and possibly content that I did not discover before finalizing the documentation.    CAMRON Huggins.

## 2024-05-07 ENCOUNTER — TELEPHONE (OUTPATIENT)
Dept: HEALTH INFORMATION MANAGEMENT | Facility: OTHER | Age: 79
End: 2024-05-07

## 2024-05-27 ENCOUNTER — HOSPITAL ENCOUNTER (EMERGENCY)
Facility: MEDICAL CENTER | Age: 79
End: 2024-05-27
Attending: EMERGENCY MEDICINE
Payer: MEDICARE

## 2024-05-27 VITALS
WEIGHT: 123.46 LBS | OXYGEN SATURATION: 96 % | SYSTOLIC BLOOD PRESSURE: 131 MMHG | TEMPERATURE: 97.3 F | HEIGHT: 62 IN | DIASTOLIC BLOOD PRESSURE: 55 MMHG | HEART RATE: 64 BPM | RESPIRATION RATE: 16 BRPM | BODY MASS INDEX: 22.72 KG/M2

## 2024-05-27 DIAGNOSIS — H53.9 VISION CHANGES: ICD-10-CM

## 2024-05-27 PROCEDURE — 700101 HCHG RX REV CODE 250: Performed by: EMERGENCY MEDICINE

## 2024-05-27 PROCEDURE — 99284 EMERGENCY DEPT VISIT MOD MDM: CPT

## 2024-05-27 RX ORDER — PROPARACAINE HYDROCHLORIDE 5 MG/ML
1 SOLUTION/ DROPS OPHTHALMIC ONCE
Status: COMPLETED | OUTPATIENT
Start: 2024-05-27 | End: 2024-05-27

## 2024-05-27 RX ADMIN — FLUORESCEIN SODIUM 1 MG: 1 STRIP OPHTHALMIC at 08:45

## 2024-05-27 RX ADMIN — PROPARACAINE HYDROCHLORIDE 1 DROP: 5 SOLUTION/ DROPS OPHTHALMIC at 08:45

## 2024-05-27 ASSESSMENT — FIBROSIS 4 INDEX: FIB4 SCORE: 1.24

## 2024-05-27 NOTE — ED NOTES
Dc instructions reviewed with pt. Aware of need to f/u with ophthalmology in am-number proved for same-will call her tomorrow. If not, pt is to call dr cosme-number provided-who will facilitate same. Return to regional if needed

## 2024-05-27 NOTE — DISCHARGE INSTRUCTIONS
Your symptoms may be from a small vitreous hemorrhage or a small retinal detachment.  Your exam was otherwise very reassuring.  You should receive a call from ophthalmology by tomorrow morning and should be able to be seen then.  If you do not receive a call by tomorrow at 11 please call me at [phone], ask for Dr. Isaac and I will ensure that you are seeing tomorrow

## 2024-05-27 NOTE — ED PROVIDER NOTES
ED Provider Note    CHIEF COMPLAINT  Chief Complaint   Patient presents with    Blurred Vision     To right eye upon awakening       EXTERNAL RECORDS REVIEWED  Outpatient Notes note 4/15/2024, patient with some eye itching redness and swelling, patient was diagnosed with allergic dermatitis of her lids and started on Kenalog and prednisone    HPI/ROS      Hallie Zara Fisher is a 79 y.o. female who presents painless vision change.  Patient reports some decreased vision in her right eye.  Patient states that she woke up, and she felt like the periphery of her right eye was blurry.  She reports that she washed her eye out thinking it might be an eyelash stuck in her eye.  Symptoms persisted despite this.  She went and started playing pickle ball but felt uncomfortable as she was having difficulty seeing out of the periphery of her right eye.  Therefore she came to the emergency department.  She denies any associated eye pain.  Patient denies any associated fevers or chills.  She denies any focal weakness or numbness.  She denies any significant headache.  Patient reports that she can see out of the periphery of her but it is distorted somehow.  She denies any associated scotomas, or significant increase in floaters.  Patient denies any associated difficulty speaking, ambulating, or dizziness.    PAST MEDICAL HISTORY   has a past medical history of Anesthesia, Anxiety (12/27/2022), Bereavement (11/8/2022), BPPV (benign paroxysmal positional vertigo), Closed fracture of radius, History of meningioma (11/26/2019), Hyperlipidemia, Osteoarthritis of multiple joints (8/29/2022), Sleep apnea, Tinnitus of both ears (9/7/2023), and Torn rotator cuff.    SURGICAL HISTORY   has a past surgical history that includes tonsillectomy (as child); knee arthroscopy (Left, 2009); orif, wrist (9/4/2013); carpal tunnel release (9/4/2013); lumpectomy (Right, 1970's); shoulder decompression arthroscopic (Right, 3/1/2016); shoulder  "arthroscopy w/ rotator cuff repair (Right, 3/1/2016); and total hip arthroplasty (Left, 5/9/2022).    FAMILY HISTORY  Family History   Problem Relation Age of Onset    Lung Disease Mother         emphysema    Alcohol/Drug Mother     Alcohol/Drug Father     Stroke Brother        SOCIAL HISTORY  Social History     Tobacco Use    Smoking status: Never    Smokeless tobacco: Never   Vaping Use    Vaping status: Never Used   Substance and Sexual Activity    Alcohol use: Yes     Comment: one per week or once a month     Drug use: No    Sexual activity: Yes     Partners: Male     Comment: ,  [place],part time; no kids       CURRENT MEDICATIONS  Home Medications    **Home medications have not yet been reviewed for this encounter**         ALLERGIES  Allergies   Allergen Reactions    Penicillins Hives       PHYSICAL EXAM  VITAL SIGNS: BP (!) 177/72   Pulse 75   Temp 36.9 °C (98.5 °F) (Temporal)   Resp 13   Ht 1.575 m (5' 2\")   Wt 56 kg (123 lb 7.3 oz)   SpO2 94%   BMI 22.58 kg/m²    Physical Exam  HENT:      Head: Normocephalic and atraumatic.   Eyes:      Comments: Fluorescein exam is unremarkable.  Patient with no associated visual field deficit, she has vision in all 4 quadrants of both eyes.  Conjunctiva is unremarkable.  No gaze deficit.  No strabismus or diplopia evoked on range of motion of extraocular muscles.  Dennis-Pen is 12 in the affected eye.  Ultrasound fails to reveal any large retinal detachment or significant abnormality otherwise   Pulmonary:      Effort: Pulmonary effort is normal.   Neurological:      General: No focal deficit present.      Mental Status: She is alert and oriented to person, place, and time.      Comments: Distal and proximal strength 5/5 in upper and lower extremities. Normal gait. No dysmetria. No sensation deficits. No visual field deficits. Cranial nerves intact.        Psychiatric:         Mood and Affect: Mood normal.               COURSE & MEDICAL " DECISION MAKING    ASSESSMENT, COURSE AND PLAN  Care Narrative: Patient here with decreased vision in the periphery of right eye.  This does not appear consistent with a branch retinal artery occlusion as she remains with vision.  Possible branch retinal vein occlusion is possible as well certainly a mild vitreous hemorrhage or retinal detachment is possible.  Patient with reassuring neurologic exam.  Patient without any evidence of globe rupture or glaucoma.  Patient to follow-up closely with ophthalmology which per agreement should be within the next 48 hours.  Patient should receive a call from their office by tomorrow morning, she understands she can call me tomorrow if she has not received a call so I can help facilitate this further              DISPOSITION AND DISCUSSIONS      Escalation of care considered, and ultimately not performed:diagnostic imaging deferred in this well-appearing patient, symptoms are not consistent with branch retinal artery occlusion, my suspicion of CVA or neurologic etiology is very low, therefore CTAs, MRIs deferred        FINAL DIAGNOSIS  1. Vision changes

## 2024-05-27 NOTE — ED NOTES
When pt woke up at 0500 she stated her right eye was unfocused. Pt attempted to irrigate to rule out foreign object. Pt's vision did not improve throughout the morning. Pt denies new headaches. Pt denies recent falls or trauma.

## 2024-07-08 ENCOUNTER — OFFICE VISIT (OUTPATIENT)
Dept: URGENT CARE | Facility: CLINIC | Age: 79
End: 2024-07-08
Payer: MEDICARE

## 2024-07-08 VITALS
HEIGHT: 62 IN | WEIGHT: 123 LBS | RESPIRATION RATE: 14 BRPM | HEART RATE: 64 BPM | SYSTOLIC BLOOD PRESSURE: 114 MMHG | TEMPERATURE: 97.2 F | DIASTOLIC BLOOD PRESSURE: 60 MMHG | OXYGEN SATURATION: 97 % | BODY MASS INDEX: 22.63 KG/M2

## 2024-07-08 DIAGNOSIS — R11.2 NAUSEA AND VOMITING, UNSPECIFIED VOMITING TYPE: ICD-10-CM

## 2024-07-08 DIAGNOSIS — H81.10 BENIGN PAROXYSMAL POSITIONAL VERTIGO, UNSPECIFIED LATERALITY: ICD-10-CM

## 2024-07-08 DIAGNOSIS — R42 DIZZINESS: ICD-10-CM

## 2024-07-08 LAB
APPEARANCE UR: CLEAR
BILIRUB UR STRIP-MCNC: NORMAL MG/DL
COLOR UR AUTO: YELLOW
GLUCOSE BLD-MCNC: 105 MG/DL (ref 65–99)
GLUCOSE UR STRIP.AUTO-MCNC: NORMAL MG/DL
KETONES UR STRIP.AUTO-MCNC: NORMAL MG/DL
LEUKOCYTE ESTERASE UR QL STRIP.AUTO: NORMAL
NITRITE UR QL STRIP.AUTO: NORMAL
PH UR STRIP.AUTO: 6.5 [PH] (ref 5–8)
PROT UR QL STRIP: NORMAL MG/DL
RBC UR QL AUTO: NORMAL
SP GR UR STRIP.AUTO: 1.01
UROBILINOGEN UR STRIP-MCNC: 0.2 MG/DL

## 2024-07-08 PROCEDURE — 82962 GLUCOSE BLOOD TEST: CPT

## 2024-07-08 PROCEDURE — 81002 URINALYSIS NONAUTO W/O SCOPE: CPT

## 2024-07-08 PROCEDURE — 3074F SYST BP LT 130 MM HG: CPT

## 2024-07-08 PROCEDURE — 93000 ELECTROCARDIOGRAM COMPLETE: CPT

## 2024-07-08 PROCEDURE — 3078F DIAST BP <80 MM HG: CPT

## 2024-07-08 PROCEDURE — 99214 OFFICE O/P EST MOD 30 MIN: CPT

## 2024-07-08 RX ORDER — ONDANSETRON 4 MG/1
4 TABLET, ORALLY DISINTEGRATING ORAL EVERY 6 HOURS PRN
Qty: 15 TABLET | Refills: 0 | Status: SHIPPED | OUTPATIENT
Start: 2024-07-08

## 2024-07-08 RX ORDER — MECLIZINE HCL 12.5 MG/1
12.5 TABLET ORAL 3 TIMES DAILY PRN
Qty: 30 TABLET | Refills: 0 | Status: SHIPPED | OUTPATIENT
Start: 2024-07-08 | End: 2024-07-08

## 2024-07-08 ASSESSMENT — FIBROSIS 4 INDEX: FIB4 SCORE: 1.24

## 2024-07-11 ENCOUNTER — OFFICE VISIT (OUTPATIENT)
Dept: MEDICAL GROUP | Facility: MEDICAL CENTER | Age: 79
End: 2024-07-11
Payer: MEDICARE

## 2024-07-11 ENCOUNTER — PATIENT MESSAGE (OUTPATIENT)
Dept: MEDICAL GROUP | Facility: MEDICAL CENTER | Age: 79
End: 2024-07-11

## 2024-07-11 VITALS
HEART RATE: 57 BPM | WEIGHT: 124.5 LBS | DIASTOLIC BLOOD PRESSURE: 60 MMHG | SYSTOLIC BLOOD PRESSURE: 120 MMHG | TEMPERATURE: 98.4 F | HEIGHT: 62 IN | OXYGEN SATURATION: 97 % | BODY MASS INDEX: 22.91 KG/M2

## 2024-07-11 DIAGNOSIS — G89.29 CHRONIC RIGHT SI JOINT PAIN: ICD-10-CM

## 2024-07-11 DIAGNOSIS — R42 VERTIGO: ICD-10-CM

## 2024-07-11 DIAGNOSIS — M54.2 NECK PAIN: ICD-10-CM

## 2024-07-11 DIAGNOSIS — M53.3 CHRONIC RIGHT SI JOINT PAIN: ICD-10-CM

## 2024-07-11 DIAGNOSIS — H81.10 BENIGN PAROXYSMAL POSITIONAL VERTIGO, UNSPECIFIED LATERALITY: ICD-10-CM

## 2024-07-11 DIAGNOSIS — H93.12 TINNITUS OF LEFT EAR: ICD-10-CM

## 2024-07-11 DIAGNOSIS — E78.5 HYPERLIPIDEMIA LDL GOAL <100: ICD-10-CM

## 2024-07-11 DIAGNOSIS — E55.9 VITAMIN D DEFICIENCY: ICD-10-CM

## 2024-07-11 PROCEDURE — 3074F SYST BP LT 130 MM HG: CPT | Performed by: NURSE PRACTITIONER

## 2024-07-11 PROCEDURE — 3078F DIAST BP <80 MM HG: CPT | Performed by: NURSE PRACTITIONER

## 2024-07-11 PROCEDURE — 99214 OFFICE O/P EST MOD 30 MIN: CPT | Performed by: NURSE PRACTITIONER

## 2024-07-11 RX ORDER — GABAPENTIN 100 MG/1
100 CAPSULE ORAL NIGHTLY PRN
Qty: 45 CAPSULE | Refills: 1 | Status: SHIPPED | OUTPATIENT
Start: 2024-07-11

## 2024-07-11 ASSESSMENT — FIBROSIS 4 INDEX: FIB4 SCORE: 1.24

## 2024-07-25 NOTE — PROGRESS NOTES
Subjective:     History of Present Illness  The patient is a 79-year-old female who is seen in follow-up for low back pain.    She experienced a vertigo attack on Sunday, an occurrence she has not experienced in 10 to 15 years. On Monday, her balance was significantly impaired, causing her to appear intoxicated. To manage her vertigo, she takes meclizine at night and Zyrtec in the morning. She also performs the Epley maneuver, which has improved her condition by 95 percent. She denies experiencing chest pain, shortness of breath, or headaches.    She reports no risk factors for hepatitis C, including blood transfusions in the 1970s or 1980s. She spent 3 years in [place], but there was no history of hepatitis C there. Her last mammogram was in 10/2023. She no longer undergoes colonoscopies. She denies experiencing chest pain, palpitations, foot swelling, shortness of breath, or wheezing.    She experiences constant ringing in her left ear, which her  has noticed she is becoming deaf. She can hear him, but struggles to hear exactly his words.    She has been experiencing hip and neck pain for a few months, which remains unchanged. She identifies her back as the source of her discomfort. She uses Biofreeze in the morning and evening for her back pain. She plays pickleball 3 days a week, but had to stop due to vertigo. She experiences sudden, sharp pain in her back, which does not radiate down her leg. Her neck pain has lessened, but certain movements cause discomfort.   She has never smoked.   She had whooping cough vaccine last year. She had shingles and pneumococcal vaccines.      Lab reviewed with pt:  CMP - ***  CBC - ***  LP - ***  Hemoglobin A1c - ***  Alb/cr ratio - ***  Vitamin D3 - ***  GFR - ***    Current medicines (including changes today)  Current Outpatient Medications   Medication Sig Dispense Refill    gabapentin (NEURONTIN) 100 MG Cap Take 1 Capsule by mouth at bedtime as needed (lbp). 45 Capsule 1  "   ondansetron (ZOFRAN ODT) 4 MG TABLET DISPERSIBLE Take 1 Tablet by mouth every 6 hours as needed for Nausea/Vomiting for up to 15 doses. 15 Tablet 0    sertraline (ZOLOFT) 50 MG Tab TAKE 1 TABLET BY MOUTH EVERY  Tablet 2    atorvastatin (LIPITOR) 10 MG Tab TAKE 1 TABLET BY MOUTH EVERY 48 HOURS. 50 Tablet 3    Multiple Vitamins-Minerals (CENTRUM SILVER) TABS Take  by mouth every day.      Magnesium 400 MG CAPS Take  by mouth every 48 hours as needed.      Calcium Carbonate-Vitamin D (CALCIUM 600 + D PO) Take  by mouth every day.      Cholecalciferol (VITAMIN D) 2000 UNITS CAPS Take  by mouth every day.       No current facility-administered medications for this visit.     She  has a past medical history of Anesthesia, Anxiety (12/27/2022), Bereavement (11/8/2022), BPPV (benign paroxysmal positional vertigo), Closed fracture of radius, History of meningioma (11/26/2019), Hyperlipidemia, Osteoarthritis of multiple joints (8/29/2022), Sleep apnea, Tinnitus of both ears (9/7/2023), and Torn rotator cuff.    Hemoglobin A1c:***  No results found for: \"HBA1C\"    Microalbumin/creatinine Ratio:***  No results found for: \"URCREAT\", \"MICROALBUR\", \"MALBCRT\"    Lipid Panel:***  Lab Results   Component Value Date/Time    CHOLSTRLTOT 192 08/25/2023 0605    TRIGLYCERIDE 102 08/25/2023 0605     (H) 08/25/2023 0605       Thyroid:***  Lab Results   Component Value Date/Time    TSHULTRASEN 1.990 01/06/2023 1042     Lab Results   Component Value Date/Time    FREET4 0.86 04/30/2012 0825       Complete Blood Count:***  Lab Results   Component Value Date/Time    WBC 5.6 08/25/2023 0605    RBC 4.14 (L) 08/25/2023 0605    HEMOGLOBIN 12.9 08/25/2023 0605    HEMATOCRIT 40.0 08/25/2023 0605    MCV 96.6 08/25/2023 0605    MCH 31.2 08/25/2023 0605    MCHC 32.3 08/25/2023 0605    RDW 47.8 08/25/2023 0605    MPV 9.8 08/25/2023 0605    LYMPHOCYTES 33.90 08/25/2023 0605    LYMPHS 1.89 08/25/2023 0605    MONOCYTES 7.70 08/25/2023 0605 "    MONOS 0.43 08/25/2023 0605    EOSINOPHILS 1.30 08/25/2023 0605    EOS 0.07 08/25/2023 0605    BASOPHILS 0.70 08/25/2023 0605    BASO 0.04 08/25/2023 0605    NRBC 0.00 08/25/2023 0605       Complete Metabolic Panel:***  Lab Results   Component Value Date/Time    SODIUM 139 08/25/2023 06:05 AM    POTASSIUM 4.4 08/25/2023 06:05 AM    CHLORIDE 103 08/25/2023 06:05 AM    CO2 28 08/25/2023 06:05 AM    ANION 8.0 08/25/2023 06:05 AM    GLUCOSE 100 (H) 08/25/2023 06:05 AM    BUN 10 08/25/2023 06:05 AM    CREATININE 0.71 08/25/2023 06:05 AM    CREATININE 0.78 08/18/2009 01:59 PM    ASTSGOT 25 08/25/2023 06:05 AM    ALTSGPT 18 08/25/2023 06:05 AM    TBILIRUBIN 0.3 08/25/2023 06:05 AM    ALBUMIN 4.6 08/25/2023 06:05 AM    TOTPROTEIN 6.7 08/25/2023 06:05 AM    GLOBULIN 2.1 08/25/2023 06:05 AM    AGRATIO 2.2 08/25/2023 06:05 AM         Vitamin D:***    Lab Results   Component Value Date/Time    25HYDROXY 65 01/06/2023 1042       GFR:***    Lab Results   Component Value Date/Time    GFRCKD 88 08/25/2023 0605           ROS ***  Review of Systems   Constitutional: Negative.  Negative for fever, chills, weight loss, malaise/fatigue and diaphoresis.   HENT: Negative.  Negative for hearing loss, ear pain, nosebleeds, congestion, sore throat, neck pain, tinnitus and ear discharge.    Respiratory: Negative.  Negative for cough, hemoptysis, sputum production, shortness of breath, wheezing and stridor.    Cardiovascular: Negative.  Negative for chest pain, palpitations, orthopnea, claudication, leg swelling and PND.   Gastrointestinal: denies nausea, vomiting, diarrhea, constipation, heartburn, melena or hematochezia.  Genitourinary: Denies dysuria, hematuria, urinary incontinence, frequency or urgency.    Musculoskeletal: Negative.  Negative for myalgias and back pain.   Neurological: Negative.  Negative for dizziness, tingling, tremors, weakness and headaches.   Psych:  Denies depression, anxiety or insomnia.  All other systems  "reviewed and are negative.         Objective:     /60 (BP Location: Left arm, Patient Position: Sitting, BP Cuff Size: Adult)   Pulse (!) 57   Temp 36.9 °C (98.4 °F) (Temporal)   Ht 1.575 m (5' 2\")   Wt 56.5 kg (124 lb 8 oz)   SpO2 97%  Body mass index is 22.77 kg/m². ***  Physical Exam    Physical Exam   Vitals reviewed.  Constitutional: oriented to person, place, and time. appears well-developed and well-nourished. No distress.   Neck: No JVD present.  Cardiovascular: Normal rate, regular rhythm, normal heart sounds and intact distal pulses.  Exam reveals no gallop and no friction rub.  No murmur heard.  No carotid bruits.   Pulmonary/Chest: Effort normal and breath sounds normal. No stridor. No respiratory distress. no wheezes or rales. exhibits no tenderness.   Musculoskeletal: Normal range of motion. exhibits no edema. nedra pedal pulses 2+.  Lymphadenopathy: no cervical or supraclavicular adenopathy.   Neurological: alert and oriented to person, place, and time. exhibits normal muscle tone. Coordination normal.   Skin: Skin is warm and dry. no diaphoresis.   Psychiatric: normal mood and affect. behavior is normal.           Assessment and Plan:   The following treatment plan was discussed      Assessment & Plan  1. Vertigo.  Her blood pressure, temperature, and breathing are within normal limits today. However, her heart rate is low, which is typical for her.    2. Health maintenance.  Her next mammogram is due in 10/2024. She no longer undergoes colonoscopies. A bone density test is scheduled for 10/2024. She received her whooping cough vaccine last year. Her labs were ordered for today.    3. Tinnitus.  This condition can affect her hearing. A referral to audiology was made.    4. Low back pain.  Her lumbar spine x-ray from 2023 revealed minimal retrolisthesis and moderate degenerative changes. Arthritis is present at every level, with the most severe at L5-S1. Gabapentin 100 mg, 45 tablets with 1 " refill, was prescribed, with instructions to take one tablet as needed and increase the dosage as needed. Physical therapy was suggested. If gabapentin proves ineffective, a referral to a physiatrist for back injections will be considered.    5. Neck pain.  Gabapentin was prescribed. If gabapentin proves ineffective, physical therapy will be considered. If neck pain persists, an x-ray of the neck will be considered.    Follow-up  She will follow up in 09/2024.      ORDERS:  1. Vertigo  ***  - Referral to Audiology    2. Tinnitus of left ear  ***  - Referral to Audiology    3. Chronic right SI joint pain  ***  - gabapentin (NEURONTIN) 100 MG Cap; Take 1 Capsule by mouth at bedtime as needed (lbp).  Dispense: 45 Capsule; Refill: 1    4. Neck pain  ***  - gabapentin (NEURONTIN) 100 MG Cap; Take 1 Capsule by mouth at bedtime as needed (lbp).  Dispense: 45 Capsule; Refill: 1          Please note that this dictation was created using voice recognition software. I have made every reasonable attempt to correct obvious errors, but I expect that there are errors of grammar and possibly content that I did not discover before finalizing the note.      Attestation      Verbal consent was acquired by the patient to use [employer] ambient listening note generation during this visit {YES/[id number]}

## 2024-07-26 ASSESSMENT — PATIENT HEALTH QUESTIONNAIRE - PHQ9
1. LITTLE INTEREST OR PLEASURE IN DOING THINGS: NOT AT ALL
2. FEELING DOWN, DEPRESSED, IRRITABLE, OR HOPELESS: NOT AT ALL

## 2024-07-26 ASSESSMENT — ACTIVITIES OF DAILY LIVING (ADL): BATHING_REQUIRES_ASSISTANCE: 0

## 2024-07-26 ASSESSMENT — ENCOUNTER SYMPTOMS: GENERAL WELL-BEING: GOOD

## 2024-07-31 ENCOUNTER — OFFICE VISIT (OUTPATIENT)
Dept: FAMILY PLANNING/WOMEN'S HEALTH CLINIC | Facility: PHYSICIAN GROUP | Age: 79
End: 2024-07-31
Payer: MEDICARE

## 2024-07-31 VITALS
SYSTOLIC BLOOD PRESSURE: 122 MMHG | BODY MASS INDEX: 22.63 KG/M2 | DIASTOLIC BLOOD PRESSURE: 60 MMHG | HEIGHT: 62 IN | WEIGHT: 123 LBS

## 2024-07-31 DIAGNOSIS — F41.9 ANXIETY: ICD-10-CM

## 2024-07-31 DIAGNOSIS — E78.5 HYPERLIPIDEMIA LDL GOAL <100: ICD-10-CM

## 2024-07-31 DIAGNOSIS — H81.10 BENIGN PAROXYSMAL POSITIONAL VERTIGO, UNSPECIFIED LATERALITY: ICD-10-CM

## 2024-07-31 DIAGNOSIS — G47.33 OSA (OBSTRUCTIVE SLEEP APNEA): Chronic | ICD-10-CM

## 2024-07-31 SDOH — ECONOMIC STABILITY: FOOD INSECURITY: WITHIN THE PAST 12 MONTHS, THE FOOD YOU BOUGHT JUST DIDN'T LAST AND YOU DIDN'T HAVE MONEY TO GET MORE.: NEVER TRUE

## 2024-07-31 SDOH — ECONOMIC STABILITY: FOOD INSECURITY: WITHIN THE PAST 12 MONTHS, YOU WORRIED THAT YOUR FOOD WOULD RUN OUT BEFORE YOU GOT MONEY TO BUY MORE.: NEVER TRUE

## 2024-07-31 SDOH — ECONOMIC STABILITY: INCOME INSECURITY: HOW HARD IS IT FOR YOU TO PAY FOR THE VERY BASICS LIKE FOOD, HOUSING, MEDICAL CARE, AND HEATING?: NOT HARD AT ALL

## 2024-07-31 ASSESSMENT — PATIENT HEALTH QUESTIONNAIRE - PHQ9: CLINICAL INTERPRETATION OF PHQ2 SCORE: 0

## 2024-07-31 ASSESSMENT — FIBROSIS 4 INDEX: FIB4 SCORE: 1.24

## 2024-07-31 ASSESSMENT — PAIN SCALES - GENERAL: PAINLEVEL: NO PAIN

## 2024-08-12 ENCOUNTER — HOSPITAL ENCOUNTER (OUTPATIENT)
Dept: LAB | Facility: MEDICAL CENTER | Age: 79
End: 2024-08-12
Attending: NURSE PRACTITIONER
Payer: MEDICARE

## 2024-08-12 DIAGNOSIS — E55.9 VITAMIN D DEFICIENCY: ICD-10-CM

## 2024-08-12 DIAGNOSIS — E78.5 HYPERLIPIDEMIA LDL GOAL <100: ICD-10-CM

## 2024-08-12 LAB
25(OH)D3 SERPL-MCNC: 79 NG/ML (ref 30–100)
ALBUMIN SERPL BCP-MCNC: 4.3 G/DL (ref 3.2–4.9)
ALBUMIN/GLOB SERPL: 1.8 G/DL
ALP SERPL-CCNC: 109 U/L (ref 30–99)
ALT SERPL-CCNC: 14 U/L (ref 2–50)
ANION GAP SERPL CALC-SCNC: 13 MMOL/L (ref 7–16)
AST SERPL-CCNC: 22 U/L (ref 12–45)
BILIRUB SERPL-MCNC: 0.3 MG/DL (ref 0.1–1.5)
BUN SERPL-MCNC: 10 MG/DL (ref 8–22)
CALCIUM ALBUM COR SERPL-MCNC: 9.1 MG/DL (ref 8.5–10.5)
CALCIUM SERPL-MCNC: 9.3 MG/DL (ref 8.4–10.2)
CHLORIDE SERPL-SCNC: 100 MMOL/L (ref 96–112)
CHOLEST SERPL-MCNC: 179 MG/DL (ref 100–199)
CO2 SERPL-SCNC: 24 MMOL/L (ref 20–33)
CREAT SERPL-MCNC: 0.65 MG/DL (ref 0.5–1.4)
FASTING STATUS PATIENT QL REPORTED: NORMAL
GFR SERPLBLD CREATININE-BSD FMLA CKD-EPI: 90 ML/MIN/1.73 M 2
GLOBULIN SER CALC-MCNC: 2.4 G/DL (ref 1.9–3.5)
GLUCOSE SERPL-MCNC: 96 MG/DL (ref 65–99)
HDLC SERPL-MCNC: 66 MG/DL
LDLC SERPL CALC-MCNC: 93 MG/DL
POTASSIUM SERPL-SCNC: 3.9 MMOL/L (ref 3.6–5.5)
PROT SERPL-MCNC: 6.7 G/DL (ref 6–8.2)
SODIUM SERPL-SCNC: 137 MMOL/L (ref 135–145)
TRIGL SERPL-MCNC: 99 MG/DL (ref 0–149)

## 2024-08-12 PROCEDURE — 80061 LIPID PANEL: CPT

## 2024-08-12 PROCEDURE — 80053 COMPREHEN METABOLIC PANEL: CPT

## 2024-08-12 PROCEDURE — 36415 COLL VENOUS BLD VENIPUNCTURE: CPT

## 2024-08-12 PROCEDURE — 82306 VITAMIN D 25 HYDROXY: CPT

## 2024-09-12 SDOH — HEALTH STABILITY: PHYSICAL HEALTH: ON AVERAGE, HOW MANY MINUTES DO YOU ENGAGE IN EXERCISE AT THIS LEVEL?: 90 MIN

## 2024-09-12 SDOH — ECONOMIC STABILITY: INCOME INSECURITY: IN THE LAST 12 MONTHS, WAS THERE A TIME WHEN YOU WERE NOT ABLE TO PAY THE MORTGAGE OR RENT ON TIME?: NO

## 2024-09-12 SDOH — ECONOMIC STABILITY: INCOME INSECURITY: HOW HARD IS IT FOR YOU TO PAY FOR THE VERY BASICS LIKE FOOD, HOUSING, MEDICAL CARE, AND HEATING?: NOT HARD AT ALL

## 2024-09-12 SDOH — ECONOMIC STABILITY: FOOD INSECURITY: WITHIN THE PAST 12 MONTHS, THE FOOD YOU BOUGHT JUST DIDN'T LAST AND YOU DIDN'T HAVE MONEY TO GET MORE.: NEVER TRUE

## 2024-09-12 SDOH — HEALTH STABILITY: PHYSICAL HEALTH: ON AVERAGE, HOW MANY DAYS PER WEEK DO YOU ENGAGE IN MODERATE TO STRENUOUS EXERCISE (LIKE A BRISK WALK)?: 3 DAYS

## 2024-09-12 SDOH — ECONOMIC STABILITY: FOOD INSECURITY: WITHIN THE PAST 12 MONTHS, YOU WORRIED THAT YOUR FOOD WOULD RUN OUT BEFORE YOU GOT MONEY TO BUY MORE.: NEVER TRUE

## 2024-09-12 ASSESSMENT — SOCIAL DETERMINANTS OF HEALTH (SDOH)
HOW OFTEN DO YOU ATTENT MEETINGS OF THE CLUB OR ORGANIZATION YOU BELONG TO?: NEVER
HOW OFTEN DO YOU ATTEND CHURCH OR RELIGIOUS SERVICES?: NEVER
HOW MANY DRINKS CONTAINING ALCOHOL DO YOU HAVE ON A TYPICAL DAY WHEN YOU ARE DRINKING: 1 OR 2
HOW OFTEN DO YOU HAVE A DRINK CONTAINING ALCOHOL: MONTHLY OR LESS
HOW OFTEN DO YOU HAVE SIX OR MORE DRINKS ON ONE OCCASION: NEVER
HOW OFTEN DO YOU GET TOGETHER WITH FRIENDS OR RELATIVES?: THREE TIMES A WEEK
HOW OFTEN DO YOU GET TOGETHER WITH FRIENDS OR RELATIVES?: THREE TIMES A WEEK
HOW HARD IS IT FOR YOU TO PAY FOR THE VERY BASICS LIKE FOOD, HOUSING, MEDICAL CARE, AND HEATING?: NOT HARD AT ALL
WITHIN THE PAST 12 MONTHS, YOU WORRIED THAT YOUR FOOD WOULD RUN OUT BEFORE YOU GOT THE MONEY TO BUY MORE: NEVER TRUE
HOW OFTEN DO YOU ATTENT MEETINGS OF THE CLUB OR ORGANIZATION YOU BELONG TO?: NEVER
DO YOU BELONG TO ANY CLUBS OR ORGANIZATIONS SUCH AS CHURCH GROUPS UNIONS, FRATERNAL OR ATHLETIC GROUPS, OR SCHOOL GROUPS?: NO
IN A TYPICAL WEEK, HOW MANY TIMES DO YOU TALK ON THE PHONE WITH FAMILY, FRIENDS, OR NEIGHBORS?: TWICE A WEEK
IN THE PAST 12 MONTHS, HAS THE ELECTRIC, GAS, OIL, OR WATER COMPANY THREATENED TO SHUT OFF SERVICE IN YOUR HOME?: NO
DO YOU BELONG TO ANY CLUBS OR ORGANIZATIONS SUCH AS CHURCH GROUPS UNIONS, FRATERNAL OR ATHLETIC GROUPS, OR SCHOOL GROUPS?: NO
HOW OFTEN DO YOU ATTEND CHURCH OR RELIGIOUS SERVICES?: NEVER
IN A TYPICAL WEEK, HOW MANY TIMES DO YOU TALK ON THE PHONE WITH FAMILY, FRIENDS, OR NEIGHBORS?: TWICE A WEEK

## 2024-09-12 ASSESSMENT — LIFESTYLE VARIABLES
HOW OFTEN DO YOU HAVE SIX OR MORE DRINKS ON ONE OCCASION: NEVER
AUDIT-C TOTAL SCORE: 1
SKIP TO QUESTIONS 9-10: 1
HOW OFTEN DO YOU HAVE A DRINK CONTAINING ALCOHOL: MONTHLY OR LESS
HOW MANY STANDARD DRINKS CONTAINING ALCOHOL DO YOU HAVE ON A TYPICAL DAY: 1 OR 2

## 2024-09-14 ENCOUNTER — HOSPITAL ENCOUNTER (EMERGENCY)
Facility: MEDICAL CENTER | Age: 79
End: 2024-09-14
Attending: EMERGENCY MEDICINE
Payer: MEDICARE

## 2024-09-14 VITALS
DIASTOLIC BLOOD PRESSURE: 71 MMHG | TEMPERATURE: 97.6 F | WEIGHT: 120.81 LBS | SYSTOLIC BLOOD PRESSURE: 159 MMHG | OXYGEN SATURATION: 92 % | HEART RATE: 79 BPM | HEIGHT: 62 IN | BODY MASS INDEX: 22.23 KG/M2 | RESPIRATION RATE: 18 BRPM

## 2024-09-14 DIAGNOSIS — R11.2 NAUSEA VOMITING AND DIARRHEA: ICD-10-CM

## 2024-09-14 DIAGNOSIS — Z20.822 CLOSE EXPOSURE TO COVID-19 VIRUS: ICD-10-CM

## 2024-09-14 DIAGNOSIS — R19.7 NAUSEA VOMITING AND DIARRHEA: ICD-10-CM

## 2024-09-14 LAB
FLUAV RNA SPEC QL NAA+PROBE: NEGATIVE
FLUBV RNA SPEC QL NAA+PROBE: NEGATIVE
RSV RNA SPEC QL NAA+PROBE: NEGATIVE
SARS-COV-2 RNA RESP QL NAA+PROBE: NOTDETECTED
SPECIMEN SOURCE: NORMAL

## 2024-09-14 PROCEDURE — 0241U HCHG SARS-COV-2 COVID-19 NFCT DS RESP RNA 4 TRGT MIC: CPT

## 2024-09-14 PROCEDURE — 99284 EMERGENCY DEPT VISIT MOD MDM: CPT

## 2024-09-14 PROCEDURE — 700111 HCHG RX REV CODE 636 W/ 250 OVERRIDE (IP): Performed by: EMERGENCY MEDICINE

## 2024-09-14 RX ORDER — ONDANSETRON 4 MG/1
4 TABLET, ORALLY DISINTEGRATING ORAL ONCE
Status: COMPLETED | OUTPATIENT
Start: 2024-09-14 | End: 2024-09-14

## 2024-09-14 RX ORDER — ONDANSETRON 4 MG/1
4 TABLET, ORALLY DISINTEGRATING ORAL EVERY 8 HOURS PRN
Qty: 10 TABLET | Refills: 0 | Status: SHIPPED | OUTPATIENT
Start: 2024-09-14

## 2024-09-14 RX ADMIN — ONDANSETRON 4 MG: 4 TABLET, ORALLY DISINTEGRATING ORAL at 19:06

## 2024-09-14 ASSESSMENT — FIBROSIS 4 INDEX: FIB4 SCORE: 1.24

## 2024-09-15 NOTE — ED NOTES
Dc instructions and medications discussed with patient at bedside. All questions answered at this time. VSS. No IV in place at this time. Pt to lobby without incident. self to drive patient home.     
Pt resting in bed. Call light in reach. Denies needs.   
none

## 2024-09-15 NOTE — ED PROVIDER NOTES
ER Provider Note    Scribed for Keshawn Carrillo M.D. by Juwan Stevens. 9/14/2024   6:37 PM    Primary Care Provider: BLAKE Cadena    CHIEF COMPLAINT  Chief Complaint   Patient presents with    Flu Like Symptoms     H/a, decreased appetite, vomiting, diarrhea, recent COVID + contact. Denies abd pain. Reports T max 100.     EXTERNAL RECORDS REVIEWED      HPI/ROS  LIMITATION TO HISTORY   Select: : None  OUTSIDE HISTORIAN(S):  None.     Hallie Fisher is a 79 y.o. female who presents to the ED for evaluation of flu like symptoms onset three days ago. Patient repots that she has been experiencing associated nausea, vomiting, diarrhea, and decreased PO intake. Her last episode of vomiting was approximately fifteen minutes ago. Patient reports that she was in contact with a friend of hers that had COVID-19.     PAST MEDICAL HISTORY  Past Medical History:   Diagnosis Date    Anesthesia     PONV    Anxiety 12/27/2022    Bereavement 11/8/2022    BPPV (benign paroxysmal positional vertigo)     Closed fracture of radius     History of meningioma 11/26/2019    Hyperlipidemia     Osteoarthritis of multiple joints 8/29/2022    Sleep apnea     CPAP - PMA    Tinnitus of both ears 9/7/2023    Torn rotator cuff     right.  has seen Omar and will have [facility] in 1/16       SURGICAL HISTORY  Past Surgical History:   Procedure Laterality Date    SD TOTAL HIP ARTHROPLASTY Left 5/9/2022    Procedure: LEFT ANTERIOR TOTAL HIP ARTHROPLASTY;  Surgeon: Juwan Espino M.D.;  Location: [facility];  Service: Orthopedics    SHOULDER DECOMPRESSION ARTHROSCOPIC Right 3/1/2016    Procedure: SHOULDER DECOMPRESSION ARTHROSCOPIC - SUBACROMIAL, LABRAL DEBRIDMENT;  Surgeon: Shama Nelson M.D.;  Location: SURGERY [facility];  Service:     SHOULDER ARTHROSCOPY W/ ROTATOR CUFF REPAIR Right 3/1/2016    Procedure: SHOULDER ARTHROSCOPY W/ ROTATOR CUFF REPAIR;  Surgeon: Shama Nelson M.D.;  Location:  "SURGERY [facility];  Service:     ORIF, WRIST  9/4/2013    Performed by Prasanna Anthony M.D. at SURGERY [facility]    CARPAL TUNNEL RELEASE  9/4/2013    Performed by Prasanna Anthony M.D. at SURGERY [facility]    KNEE ARTHROSCOPY Left 2009     - Dr. Newman; left    LUMPECTOMY Right 1970's    right breast cyst removal    TONSILLECTOMY  as child       FAMILY HISTORY  Family History   Problem Relation Age of Onset    Lung Disease Mother         emphysema    Alcohol/Drug Mother     Alcohol/Drug Father     Stroke Brother        SOCIAL HISTORY   reports that she has never smoked. She has never used smokeless tobacco. She reports that she does not currently use alcohol. She reports that she does not use drugs.    CURRENT MEDICATIONS  Previous Medications    ATORVASTATIN (LIPITOR) 10 MG TAB    TAKE 1 TABLET BY MOUTH EVERY 48 HOURS.    CALCIUM CARBONATE-VITAMIN D (CALCIUM 600 + D PO)    Take  by mouth every day.    CHOLECALCIFEROL (VITAMIN D) 2000 UNITS CAPS    Take  by mouth every day.    GABAPENTIN (NEURONTIN) 100 MG CAP    Take 1 Capsule by mouth at bedtime as needed (lbp).    MAGNESIUM 400 MG CAPS    Take  by mouth every 48 hours as needed.    MULTIPLE VITAMINS-MINERALS (CENTRUM SILVER) TABS    Take  by mouth every day.    ONDANSETRON (ZOFRAN ODT) 4 MG TABLET DISPERSIBLE    Take 1 Tablet by mouth every 6 hours as needed for Nausea/Vomiting for up to 15 doses.    SERTRALINE (ZOLOFT) 50 MG TAB    TAKE 1 TABLET BY MOUTH EVERY DAY       ALLERGIES  Allergies   Allergen Reactions    Penicillins Hives        PHYSICAL EXAM  BP (!) 159/71   Pulse 79   Temp 36.4 °C (97.6 °F) (Temporal)   Resp 18   Ht 1.575 m (5' 2\")   Wt 54.8 kg (120 lb 13 oz)   SpO2 92%   BMI 22.10 kg/m²    Nursing note and vitals reviewed.  Constitutional: Well-developed and well-nourished. No distress.   HENT: Head is normocephalic and atraumatic. Oropharynx is clear and moist without exudate or erythema.   Eyes: Pupils are equal, " round, and reactive to light. Conjunctiva are normal.   Cardiovascular: Normal rate and regular rhythm. No murmur heard. Normal radial pulses.  Pulmonary/Chest: Breath sounds normal. No wheezes or rales.   Abdominal: Soft and unable to elicit any abdominal tenderness. No distention    Musculoskeletal: Extremities exhibit normal range of motion without edema or tenderness.   Neurological: Awake, alert and oriented to person, place, and time. No focal deficits noted.  Skin: Skin is warm and dry. No rash.   Psychiatric: Normal mood and affect. Appropriate for clinical situation    DIAGNOSTIC STUDIES    Labs:   Results for orders placed or performed during the hospital encounter of 09/14/24   CoV-2, FLU A/B, and RSV by PCR (2-4 Hours [facility]) : Collect NP swab in VTM    Specimen: Respirate   Result Value Ref Range    SARS-CoV-2 Source NP Swab        INITIAL ASSESSMENT AND PLAN    6:37 PM - Patient was evaluated at bedside. Patient arrives today with flu like symptoms consisting of nausea, vomiting, and diarrhea, and was recently in contact with someone known to have COVID. Ordered for viral workup to evaluate. The patient will be medicated with Zofran 4 mg for her symptoms. Patient verbalizes understanding and support with my plan of care.      This patient presents with nausea, vomiting & diarrhea. Differential diagnosis includes possible acute gastroenteritis. Abdominal exam without peritoneal signs. Currently euvolemic without evidence of dehydration. Doubt invasive bacteria causing diarrhea such as C diff (no recent antibiotics), shiga toxin (non bloody). No recent travel. Patient is not immunocompromised. Diarrhea is non bloody so less likely inflammatory bowel disease. No evidence of surgical abdomen or other acute medical emergency including bowel obstruction, viscus perforation, vascular catastrophe, atypical appendicitis, acute cholecystitis, UGIB, thyrotoxicosis, or diverticulitis at this time. Presentation  not consistent with other acute, emergent causes of vomiting / diarrhea at this time. No indication for abdominal imaging.    The patient was treated with Zofran for nausea.     On reassessment the patient was feeling better. The patient continued to have a nonsurgical abdomen. Overall the patient is improved and will be discharged home with a prescription of Zofran. I feel that this patient is a good outpatient treatment candidate. I recommended that the patient return to the emergency department should they have any abdominal discomfort does not resolve within 24 hours.          COURSE AND MEDICAL DECISION MAKING      The patient was treated with Zofran for nausea.  Placed on a cardiac monitor to monitor for any arrhythmia associated with Zofran and QT prolongation.      DISPOSITION AND DISCUSSIONS    Patient be screened with COVID.  Discussed possibly treated with Paxlovid.  However using drug decision making we have elected against this.  Will give the patient a prescription for Zofran for home use.  Patient will follow-up with [facility] for her COVID and viral testing results.     The patient will return for new or worsening symptoms and is stable at the time of discharge.    DISPOSITION:  Patient will be discharged home in stable condition.    FOLLOW UP:  Carola Velez, A.P.N.  [id number]  [id number]  [place] [id number] [phone]    Schedule an appointment as soon as possible for a visit       [facility], Emergency Dept  [id number]  [place] [id number] [phone]    If symptoms worsen      OUTPATIENT MEDICATIONS:  New Prescriptions    ONDANSETRON (ZOFRAN ODT) 4 MG TABLET DISPERSIBLE    Take 1 Tablet by mouth every 8 hours as needed for Nausea/Vomiting.        FINAL DIAGNOSIS  1. Nausea vomiting and diarrhea    2. Close exposure to COVID-19 virus         IJuwan)susu scribing for, and in the presence of, Keshawn Carrillo M.D..    Electronically  signed by: Juwan Stevens (Scribe), 9/14/2024    IKeshawn M.D. personally performed the services described in this documentation, as scribed by Juwan Stevens in my presence, and it is both accurate and complete.      The note accurately reflects work and decisions made by me.  Keshawn Carrillo M.D.  9/14/2024  7:22 PM

## 2024-09-15 NOTE — ED TRIAGE NOTES
Chief Complaint   Patient presents with    Flu Like Symptoms     H/a, decreased appetite, vomiting, diarrhea, recent COVID + contact. Denies abd pain. Reports T max 100.     Physical Exam  Pulmonary:      Effort: Pulmonary effort is normal.   Skin:     General: Skin is warm and dry.   Neurological:      Mental Status: She is alert.       Pt. Reports sx started today.

## 2024-09-16 ENCOUNTER — APPOINTMENT (OUTPATIENT)
Dept: MEDICAL GROUP | Facility: MEDICAL CENTER | Age: 79
End: 2024-09-16
Payer: MEDICARE

## 2024-09-16 VITALS
HEART RATE: 74 BPM | SYSTOLIC BLOOD PRESSURE: 104 MMHG | BODY MASS INDEX: 22.31 KG/M2 | RESPIRATION RATE: 16 BRPM | OXYGEN SATURATION: 96 % | TEMPERATURE: 97.8 F | DIASTOLIC BLOOD PRESSURE: 68 MMHG | WEIGHT: 122 LBS

## 2024-09-16 DIAGNOSIS — H93.13 TINNITUS OF BOTH EARS: ICD-10-CM

## 2024-09-16 DIAGNOSIS — M54.2 CHRONIC NECK PAIN: ICD-10-CM

## 2024-09-16 DIAGNOSIS — E78.5 HYPERLIPIDEMIA LDL GOAL <100: ICD-10-CM

## 2024-09-16 DIAGNOSIS — G47.33 OSA (OBSTRUCTIVE SLEEP APNEA): Chronic | ICD-10-CM

## 2024-09-16 DIAGNOSIS — Z86.018 HISTORY OF MENINGIOMA: ICD-10-CM

## 2024-09-16 DIAGNOSIS — Z12.31 ENCOUNTER FOR SCREENING MAMMOGRAM FOR MALIGNANT NEOPLASM OF BREAST: ICD-10-CM

## 2024-09-16 DIAGNOSIS — F41.9 ANXIETY: ICD-10-CM

## 2024-09-16 DIAGNOSIS — H81.10 BENIGN PAROXYSMAL POSITIONAL VERTIGO, UNSPECIFIED LATERALITY: ICD-10-CM

## 2024-09-16 DIAGNOSIS — N95.9 POST MENOPAUSAL PROBLEMS: ICD-10-CM

## 2024-09-16 DIAGNOSIS — G89.29 CHRONIC NECK PAIN: ICD-10-CM

## 2024-09-16 PROCEDURE — G0439 PPPS, SUBSEQ VISIT: HCPCS | Performed by: NURSE PRACTITIONER

## 2024-09-16 PROCEDURE — 3074F SYST BP LT 130 MM HG: CPT | Performed by: NURSE PRACTITIONER

## 2024-09-16 PROCEDURE — 3078F DIAST BP <80 MM HG: CPT | Performed by: NURSE PRACTITIONER

## 2024-09-16 RX ORDER — DIAZEPAM 5 MG
5 TABLET ORAL
Qty: 30 TABLET | Refills: 1 | Status: SHIPPED | OUTPATIENT
Start: 2024-09-16 | End: 2024-12-15

## 2024-09-16 RX ORDER — GABAPENTIN 100 MG/1
100 CAPSULE ORAL NIGHTLY PRN
Qty: 100 CAPSULE | Refills: 3 | Status: SHIPPED | OUTPATIENT
Start: 2024-09-16

## 2024-09-16 ASSESSMENT — ACTIVITIES OF DAILY LIVING (ADL): BATHING_REQUIRES_ASSISTANCE: 0

## 2024-09-16 ASSESSMENT — FIBROSIS 4 INDEX: FIB4 SCORE: 1.24

## 2024-09-16 ASSESSMENT — PATIENT HEALTH QUESTIONNAIRE - PHQ9: CLINICAL INTERPRETATION OF PHQ2 SCORE: 0

## 2024-09-16 ASSESSMENT — ENCOUNTER SYMPTOMS: GENERAL WELL-BEING: EXCELLENT

## 2024-09-16 NOTE — PROGRESS NOTES
Chief Complaint   Patient presents with    Annual Exam       HPI:  Hallie Fisher is a 79 y.o. here for Medicare Annual Wellness Visit.  She is feeling well.    Anxiety  Followed by therapist but will be weaning down.  Feels much better stable.  Will try and wean off zoloft.  Needs valium refilled.  Takes only occas 1/2 tab.  Needs UDS and contract updated    Hyperlipidemia LDL goal <100  Has been on much improved healthy diet.  [facility].  Would like to wean off lipitor.  She also exercises with pickles 3x/wk for 1-2 hrs.  Walks regularly.     BPPV (benign paroxysmal positional vertigo)  No current sx or tx needed.      Tinnitus of both ears  Will f/u with audiology d/t hearing changes from tinnitus    MONIK (obstructive sleep apnea)  Sleeps with CPAP but she is getting tired of it.  She is followed by pulm.  Thinking about getting a mouth guard to use instead    History of meningioma  Last MRI in 2019 showed it resolved.  MRI was wnl    Reviewed lab with pt;  CMP, GFR, LP is wnl.    Vitamin D is high normal at 79.  She is on otc supplement but doesn't know how much      Patient Active Problem List    Diagnosis Date Noted    Chronic neck pain 09/16/2024    Tinnitus of both ears 09/07/2023    Anxiety 12/27/2022    Bereavement 11/08/2022    Osteoarthritis of multiple joints 08/29/2022    History of meningioma 11/26/2019    Anesthesia     Hyperlipidemia LDL goal <100 03/08/2017    Torn rotator cuff     BPPV (benign paroxysmal positional vertigo)     Closed fracture of left radius with routine healing 09/04/2013    MONIK (obstructive sleep apnea) 08/19/2013       Current Outpatient Medications   Medication Sig Dispense Refill    diazePAM (VALIUM) 5 MG Tab Take 1 Tablet by mouth every 24 hours as needed for Anxiety for up to 90 days. 30 tabs to last 90 days with one additional refill 30 Tablet 1    gabapentin (NEURONTIN) 100 MG Cap Take 1 Capsule by mouth at bedtime as needed (lbp).  100 Capsule 3    ondansetron (ZOFRAN ODT) 4 MG TABLET DISPERSIBLE Take 1 Tablet by mouth every 8 hours as needed for Nausea/Vomiting. 10 Tablet 0    ondansetron (ZOFRAN ODT) 4 MG TABLET DISPERSIBLE Take 1 Tablet by mouth every 6 hours as needed for Nausea/Vomiting for up to 15 doses. 15 Tablet 0    sertraline (ZOLOFT) 50 MG Tab TAKE 1 TABLET BY MOUTH EVERY  Tablet 2    atorvastatin (LIPITOR) 10 MG Tab TAKE 1 TABLET BY MOUTH EVERY 48 HOURS. 50 Tablet 3    Multiple Vitamins-Minerals (CENTRUM SILVER) TABS Take  by mouth every day.      Magnesium 400 MG CAPS Take  by mouth every 48 hours as needed.      Calcium Carbonate-Vitamin D (CALCIUM 600 + D PO) Take  by mouth every day.      Cholecalciferol (VITAMIN D) 2000 UNITS CAPS Take  by mouth every day.       No current facility-administered medications for this visit.          Current supplements as per medication list.     Allergies: Penicillins         She  reports that she has never smoked. She has never used smokeless tobacco. She reports that she does not currently use alcohol. She reports that she does not use drugs.  Counseling given: Not Answered      ROS:    Gait: Uses no assistive device  Ostomy: No  Other tubes: No  Amputations: No  Chronic oxygen use: No  Last eye exam: last year  Wears hearing aids: No   : Denies any urinary leakage during the last 6 months    Screening:  Mammo and dexa scan  Depression Screening  Little interest or pleasure in doing things?  0 - not at all  Feeling down, depressed , or hopeless? 0 - not at all  Patient Health Questionnaire Score: 0     If depressive symptoms identified deferred to follow up visit unless specifically addressed in assessment and plan.    Interpretation of PHQ-9 Total Score   Score Severity   1-4 No Depression   5-9 Mild Depression   10-14 Moderate Depression   15-19 Moderately Severe Depression   20-27 Severe Depression    Screening for Cognitive Impairment  Do you or any of your friends or family  members have any concern about your memory? No  Three Minute Recall (Leader, Season, Table) 3/3    Tristin clock face with all 12 numbers and set the hands to show 10 minutes after 11.  No    Cognitive concerns identified deferred for follow up unless specifically addressed in assessment and plan.    Fall Risk Assessment  Has the patient had two or more falls in the last year or any fall with injury in the last year?  No    Safety Assessment  Do you always wear your seatbelt?  Yes  Any changes to home needed to function safely? No  Difficulty hearing.  Yes  Patient counseled about all safety risks that were identified.    Functional Assessment ADLs  Are there any barriers preventing you from cooking for yourself or meeting nutritional needs?  No.    Are there any barriers preventing you from driving safely or obtaining transportation?  No.    Are there any barriers preventing you from using a telephone or calling for help?  No    Are there any barriers preventing you from shopping?  No.    Are there any barriers preventing you from taking care of your own finances?  No    Are there any barriers preventing you from managing your medications?  No    Are there any barriers preventing you from showering, bathing or dressing yourself? No    Are there any barriers preventing you from doing housework or laundry? No  Are there any barriers preventing you from using the toilet?No  Are you currently engaging in any exercise or physical activity?  Yes.      Self-Assessment of Health  What is your perception of your health? Excellent    Do you sleep more than six hours a night? Yes    In the past 7 days, how much did pain keep you from doing your normal work? None    Do you spend quality time with family or friends (virtually or in person)? Yes    Do you usually eat a heart healthy diet that constists of a variety of fruits, vegetables, whole grains and fiber? Yes    Do you eat foods high in fat and/or Fast Food more than three  times per week? No    How concerned are you that your medical conditions are not being well managed? Not at all    Are you worried that in the next 2 months, you may not have stable housing that you own, rent, or stay in as part of a household? No      Advance Care Planning  Do you have an Advance Directive, Living Will, Durable Power of , or POLST? Yes  Advance Directive       is not on file - instructed patient to bring in a copy to scan into their chart      Health Maintenance Summary            Overdue - Influenza Vaccine (1) Overdue since 9/1/2024 09/21/2023  Imm Admin: Influenza Vaccine Adult HD    10/04/2022  Imm Admin: Influenza, Unspecified - HISTORICAL DATA    10/04/2021  Imm Admin: Influenza Vaccine Adult HD    09/09/2020  Imm Admin: Influenza Vaccine Adult HD    09/08/2020  Imm Admin: Influenza Vaccine Adult HD    Only the first 5 history entries have been loaded, but more history exists.              Ordered - Bone Density Scan (Every 2 Years) Ordered on 9/16/2024      10/11/2022  DS-BONE DENSITY STUDY (DEXA)    07/28/2020  DS-BONE DENSITY STUDY (DEXA)    11/06/2015  DS-BONE DENSITY STUDY (DEXA)    05/11/2012  DS-BONE DENSITY STUDY (DEXA)              Annual Wellness Visit (Yearly) Next due on 7/31/2025 07/31/2024  Level of Service: [place] ANNUAL WELLNESS VISIT-INCLUDES PPPS SUBSEQUE*    09/07/2023  Done    08/29/2022  Done    08/26/2021  Subsequent Annual Wellness Visit - Includes PPPS ()    07/15/2020  Subsequent Annual Wellness Visit - Includes PPPS ()    Only the first 5 history entries have been loaded, but more history exists.              IMM DTaP/Tdap/Td Vaccine (3 - Td or Tdap) Next due on 9/7/2033 09/07/2023  Imm Admin: Tdap Vaccine    08/19/2013  Imm Admin: Tdap Vaccine              Zoster (Shingles) Vaccines (Series Information) Completed      08/11/2023  Imm Admin: Zoster Vaccine Recombinant (RZV) (SHINGRIX)    05/22/2023  Imm Admin: Zoster Vaccine Recombinant  (RZV) (SHINGRIX)    05/23/2014  Imm Admin: Zoster Vaccine Live (ZVL) (Zostavax) - HISTORICAL DATA              Pneumococcal Vaccine: 65+ Years (Series Information) Completed      09/07/2023  Imm Admin: Pneumococcal Conjugate Vaccine (PCV20)    02/19/2016  Imm Admin: Pneumococcal Conjugate Vaccine (Prevnar/PCV-13)    08/19/2013  Imm Admin: Pneumococcal polysaccharide vaccine (PPSV-23)              COVID-19 Vaccine (Series Information) Completed      04/06/2024  Imm Admin: Covid-19 Mrna (Spikevax) Moderna 12+ Years    09/21/2023  Imm Admin: Comirnaty (Covid-19 Vaccine, Mrna, [id number] Formula)    05/02/2023  Outside Immunization: COVID mRNA Bivalent(MOD)    10/04/2022  Imm Admin: PFIZER BIVALENT SARS-COV-2 VACCINE (12+)    04/15/2022  Imm Admin: PFIZER OROZCO CAP SARS-COV-2 VACCINATION (12+)    Only the first 5 history entries have been loaded, but more history exists.              Hepatitis A Vaccine (Hep A) (Series Information) Aged Out      No completion history exists for this topic.              Hepatitis B Vaccine (Hep B) (Series Information) Aged Out      No completion history exists for this topic.              HPV Vaccines (Series Information) Aged Out      No completion history exists for this topic.              Polio Vaccine (Inactivated Polio) (Series Information) Aged Out      No completion history exists for this topic.              Meningococcal Immunization (Series Information) Aged Out      No completion history exists for this topic.              Discontinued - Mammogram  Ordered on 9/16/2024        Frequency changed to Never automatically (Topic No Longer Applies)    10/16/2023  MA-DIAGNOSTIC MAMMO BILAT W/TOMOSYNTHESIS W/CAD    09/02/2020  MA-SCREENING MAMMO BILAT W/TOMOSYNTHESIS W/CAD    07/02/2019  MA-SCREENING MAMMO BILAT W/TOMOSYNTHESIS W/CAD    03/23/2017  MA-MAMMO SCREENING BILAT W/JOVANY W/CAD    Only the first 5 history entries have been loaded, but more history exists.               Discontinued - Cervical Cancer Screening  Discontinued        Frequency changed to Never automatically (Topic No Longer Applies)    11/15/2012  PAP IG, RFX HPV ASCU    10/02/2009  PAP IG, RFX HPV ASCU              Discontinued - Colorectal Cancer Screening  Discontinued        Frequency changed to Never automatically (Topic No Longer Applies)    09/14/2022  COLOGUARD COLON CANCER SCREENING    09/14/2022  COLOGUARD COLON CANCER SCREENING    07/12/2019  COLOGUARD COLON CANCER SCREENING    07/12/2019  COLOGUARD COLON CANCER SCREENING    Only the first 5 history entries have been loaded, but more history exists.              Discontinued - Hepatitis C Screening  Discontinued      No completion history exists for this topic.                    Patient Care Team:  BLAKE Cadena as PCP - General (Family Medicine)  BLAKE Cadena as PCP - [facility]  [facility] as Home Health Provider (DME Supplier)  Katherine Martinez as [facility]         Social History     Tobacco Use    Smoking status: Never    Smokeless tobacco: Never   Vaping Use    Vaping status: Never Used   Substance Use Topics    Alcohol use: Not Currently     Comment: one per week or once a month     Drug use: No     Family History   Problem Relation Age of Onset    Lung Disease Mother         emphysema    Alcohol/Drug Mother     Alcohol/Drug Father     Stroke Brother      She  has a past medical history of Anesthesia, Anxiety (12/27/2022), Bereavement (11/08/2022), BPPV (benign paroxysmal positional vertigo), Closed fracture of radius, History of meningioma (11/26/2019), Hyperlipidemia, Osteoarthritis of multiple joints (08/29/2022), Sleep apnea, Tinnitus of both ears (09/07/2023), and Torn rotator cuff.   Past Surgical History:   Procedure Laterality Date    UT TOTAL HIP ARTHROPLASTY Left 5/9/2022    Procedure: LEFT ANTERIOR TOTAL HIP ARTHROPLASTY;  Surgeon: Juwan Espino M.D.;  Location: [facility]  [place];  Service: Orthopedics    SHOULDER DECOMPRESSION ARTHROSCOPIC Right 3/1/2016    Procedure: SHOULDER DECOMPRESSION ARTHROSCOPIC - SUBACROMIAL, LABRAL DEBRIDMENT;  Surgeon: Shama Nelson M.D.;  Location: SUR[facility]ERY [facility];  Service:     SHOULDER ARTHROSCOPY W/ ROTATOR CUFF REPAIR Right 3/1/2016    Procedure: SHOULDER ARTHROSCOPY W/ ROTATOR CUFF REPAIR;  Surgeon: Shama Nelson M.D.;  Location: SURGERY [facility];  Service:     ORIF, WRIST  9/4/2013    Performed by Prasanna Anthony M.D. at SURGERY [facility]    CARPAL TUNNEL RELEASE  9/4/2013    Performed by Prasanna Anthony M.D. at [facility]    KNEE ARTHROSCOPY Left 2009     - Dr. Newman; left    LUMPECTOMY Right 1970's    right breast cyst removal    TONSILLECTOMY  as child       Exam:   /68 (BP Location: Left arm, Patient Position: Sitting, BP Cuff Size: Adult)   Pulse 74   Temp 36.6 °C (97.8 °F)   Resp 16   Wt 55.3 kg (122 lb)   SpO2 96%  Body mass index is 22.31 kg/m².  Hearing excellent.    Dentition good  Alert, oriented in no acute distress.  Eye contact is good, speech goal directed, affect calm  Physical Exam   Vitals reviewed.  Constitutional: oriented to person, place, and time. appears well-developed and well-nourished. No distress.   HENT: Head: Normocephalic and atraumatic. Bilateral tympanic membranes wnl w/o bulging.  Right Ear: External ear normal. Left Ear: External ear normal. Nose: Nose normal.  Mouth/Throat: Oropharynx is clear and moist. No oropharyngeal exudate. nedra tm wnl. Eyes: Conjunctivae and EOM are normal. Pupils are equal, round, and reactive to light. Right eye exhibits no discharge. Left eye exhibits no discharge. No scleral icterus.    Neck: Normal range of motion. Neck supple. No JVD present.   Cardiovascular: Normal rate, regular rhythm, normal heart sounds and intact distal pulses.  Exam reveals no gallop and no friction rub.  No murmur heard.  No carotid bruits    Pulmonary/Chest: Effort normal and breath sounds normal. No stridor. No respiratory distress. no wheezes or rales. exhibits no tenderness.   Abdominal: Soft. Bowel sounds are normal. exhibits no distension and no mass. No tenderness. no rebound and no guarding.   Musculoskeletal: Normal range of motion. exhibits no edema or tenderness.  nedra pedal pulses 2+.  Lymphadenopathy:  no cervical or supraclavicular adenopathy.   Neurological: alert and oriented to person, place, and time. has normal reflexes. displays normal reflexes. No cranial nerve deficit. exhibits normal muscle tone. Coordination normal.   Skin: Skin is warm and dry. No rash noted. no diaphoresis. No erythema. No pallor.   Psychiatric: normal mood and affect. behavior is normal.       Assessment and Plan. The following treatment and monitoring plan is recommended:    1. Anxiety  diazePAM (VALIUM) 5 MG Tab    CANCELED: URINE DRUG SCREEN    CANCELED: Controlled Substance Treatment Agreement    RF valium. f/u 3 wks for UDS and contract.  she unknowingly was using a otc CBD crm for joint pain.  seh will stop      2. Hyperlipidemia LDL goal <100  Lipid Profile    LP wnl on lipitor. on very healthy diet & exercises regularly.  will try & stop lipitor. mark LP in 3 mo. if ok will stay off and f/u 1 yr. if not wnl will f/u      3. Chronic neck pain  DX-CERVICAL SPINE-2 OR 3 VIEWS    gabapentin (NEURONTIN) 100 MG Cap    refilled neurontin. stable on med.  do updated neck xray. f/u w/pt w/results      4. Benign paroxysmal positional vertigo, unspecified laterality      no current sx or tx needed      5. Tinnitus of both ears      f/u with audiology as sched      6. MONIK (obstructive sleep apnea)      stable on CPAP.  followed by pulm. going to try mouth brace to see if that helps instead      7. History of meningioma      resolved      8. Encounter for screening mammogram for malignant neoplasm of breast  MA-SCREENING MAMMO BILAT W/TOMOSYNTHESIS W/CAD      9.  Post menopausal problems  DS-BONE DENSITY STUDY (DEXA)    dexa scan and mammo ordered            Services suggested: No services needed at this time  Health Care Screening: Age-appropriate preventive services recommended by USPTF and ACIP covered by Medicare were discussed today. Services ordered if indicated and agreed upon by the patient.  Referrals offered: Community-based lifestyle interventions to reduce health risks and promote self-management and wellness, fall prevention, nutrition, physical activity, tobacco-use cessation, weight loss, and mental health services as per orders if indicated.    Discussion today about general wellness and lifestyle habits:    Prevent falls and reduce trip hazards; Cautioned about securing or removing rugs.  Have a working fire alarm and carbon monoxide detector;   Engage in regular physical activity and social activities     Follow-up: Return in about 1 year (around 9/16/2025), or or sooner if LP in december is abn.

## 2024-09-16 NOTE — ASSESSMENT & PLAN NOTE
Followed by therapist but will be weaning down.  Feels much better stable.  Will try and wean off zoloft.  Needs valium refilled.  Takes only occas 1/2 tab.  Needs UDS and contract updated

## 2024-09-16 NOTE — ASSESSMENT & PLAN NOTE
Sleeps with CPAP but she is getting tired of it.  She is followed by pulm.  Thinking about getting a mouth guard to use instead

## 2024-09-16 NOTE — ASSESSMENT & PLAN NOTE
Has been on much improved healthy diet.  [facility].  Would like to wean off lipitor.  She also exercises with pickles 3x/wk for 1-2 hrs.  Walks regularly.

## 2024-10-14 DIAGNOSIS — E78.5 HYPERLIPIDEMIA LDL GOAL <100: ICD-10-CM

## 2024-10-17 ENCOUNTER — HOSPITAL ENCOUNTER (OUTPATIENT)
Dept: RADIOLOGY | Facility: MEDICAL CENTER | Age: 79
End: 2024-10-17
Attending: NURSE PRACTITIONER
Payer: MEDICARE

## 2024-10-17 DIAGNOSIS — N95.9 POST MENOPAUSAL PROBLEMS: ICD-10-CM

## 2024-10-17 DIAGNOSIS — G89.29 CHRONIC NECK PAIN: ICD-10-CM

## 2024-10-17 DIAGNOSIS — M54.2 CHRONIC NECK PAIN: ICD-10-CM

## 2024-10-17 DIAGNOSIS — Z12.31 ENCOUNTER FOR SCREENING MAMMOGRAM FOR MALIGNANT NEOPLASM OF BREAST: ICD-10-CM

## 2024-10-17 PROCEDURE — 72040 X-RAY EXAM NECK SPINE 2-3 VW: CPT

## 2024-10-17 PROCEDURE — 77080 DXA BONE DENSITY AXIAL: CPT

## 2024-10-17 PROCEDURE — 77067 SCR MAMMO BI INCL CAD: CPT

## 2024-10-19 DIAGNOSIS — F41.9 ANXIETY: ICD-10-CM

## 2024-10-24 RX ORDER — SERTRALINE HYDROCHLORIDE 25 MG/1
25 TABLET, FILM COATED ORAL DAILY
Qty: 100 TABLET | Refills: 3 | Status: SHIPPED | OUTPATIENT
Start: 2024-10-24

## 2024-11-07 DIAGNOSIS — E78.5 HYPERLIPIDEMIA LDL GOAL <100: ICD-10-CM

## 2024-11-08 RX ORDER — ATORVASTATIN CALCIUM 10 MG/1
10 TABLET, FILM COATED ORAL
Qty: 100 TABLET | Refills: 3 | Status: SHIPPED | OUTPATIENT
Start: 2024-11-08

## 2024-11-08 NOTE — TELEPHONE ENCOUNTER
Received request via: Patient    Was the patient seen in the last year in this department? Yes    Does the patient have an active prescription (recently filled or refills available) for medication(s) requested? No    Pharmacy Name: [facility]/pharmacy #[id number] - [place], [place] - [id number]     Does the patient have detention Plus and need 100-day supply? (This applies to ALL medications) Yes, quantity updated to 100 days

## 2024-11-12 ENCOUNTER — HOSPITAL ENCOUNTER (OUTPATIENT)
Dept: LAB | Facility: MEDICAL CENTER | Age: 79
End: 2024-11-12
Attending: NURSE PRACTITIONER
Payer: MEDICARE

## 2024-11-12 DIAGNOSIS — E78.5 HYPERLIPIDEMIA LDL GOAL <100: ICD-10-CM

## 2024-11-12 LAB
ALBUMIN SERPL BCP-MCNC: 4.1 G/DL (ref 3.2–4.9)
ALBUMIN/GLOB SERPL: 1.8 G/DL
ALP SERPL-CCNC: 113 U/L (ref 30–99)
ALT SERPL-CCNC: 13 U/L (ref 2–50)
ANION GAP SERPL CALC-SCNC: 9 MMOL/L (ref 7–16)
AST SERPL-CCNC: 22 U/L (ref 12–45)
BILIRUB SERPL-MCNC: 0.3 MG/DL (ref 0.1–1.5)
BUN SERPL-MCNC: 11 MG/DL (ref 8–22)
CALCIUM ALBUM COR SERPL-MCNC: 9.1 MG/DL (ref 8.5–10.5)
CALCIUM SERPL-MCNC: 9.2 MG/DL (ref 8.4–10.2)
CHLORIDE SERPL-SCNC: 103 MMOL/L (ref 96–112)
CHOLEST SERPL-MCNC: 228 MG/DL (ref 100–199)
CO2 SERPL-SCNC: 26 MMOL/L (ref 20–33)
CREAT SERPL-MCNC: 0.69 MG/DL (ref 0.5–1.4)
FASTING STATUS PATIENT QL REPORTED: NORMAL
GFR SERPLBLD CREATININE-BSD FMLA CKD-EPI: 89 ML/MIN/1.73 M 2
GLOBULIN SER CALC-MCNC: 2.3 G/DL (ref 1.9–3.5)
GLUCOSE SERPL-MCNC: 95 MG/DL (ref 65–99)
HDLC SERPL-MCNC: 66 MG/DL
LDLC SERPL CALC-MCNC: 139 MG/DL
POTASSIUM SERPL-SCNC: 4.3 MMOL/L (ref 3.6–5.5)
PROT SERPL-MCNC: 6.4 G/DL (ref 6–8.2)
SODIUM SERPL-SCNC: 138 MMOL/L (ref 135–145)
TRIGL SERPL-MCNC: 114 MG/DL (ref 0–149)

## 2024-11-12 PROCEDURE — 80061 LIPID PANEL: CPT

## 2024-11-12 PROCEDURE — 36415 COLL VENOUS BLD VENIPUNCTURE: CPT

## 2024-11-12 PROCEDURE — 80053 COMPREHEN METABOLIC PANEL: CPT

## 2024-12-11 DIAGNOSIS — M54.2 CHRONIC NECK PAIN: ICD-10-CM

## 2024-12-11 DIAGNOSIS — G89.29 CHRONIC NECK PAIN: ICD-10-CM

## 2024-12-22 DIAGNOSIS — M54.2 CHRONIC NECK PAIN: ICD-10-CM

## 2024-12-22 DIAGNOSIS — G89.29 CHRONIC NECK PAIN: ICD-10-CM

## 2025-01-08 ENCOUNTER — OFFICE VISIT (OUTPATIENT)
Dept: PHYSICAL MEDICINE AND REHAB | Facility: MEDICAL CENTER | Age: 80
End: 2025-01-08
Payer: MEDICARE

## 2025-01-08 VITALS
TEMPERATURE: 97.8 F | OXYGEN SATURATION: 98 % | BODY MASS INDEX: 22.64 KG/M2 | DIASTOLIC BLOOD PRESSURE: 51 MMHG | HEART RATE: 68 BPM | HEIGHT: 62 IN | SYSTOLIC BLOOD PRESSURE: 138 MMHG | WEIGHT: 123.02 LBS

## 2025-01-08 DIAGNOSIS — M54.2 NECK PAIN: Primary | ICD-10-CM

## 2025-01-08 DIAGNOSIS — M47.812 FACET ARTHROPATHY, CERVICAL: ICD-10-CM

## 2025-01-08 PROCEDURE — 3078F DIAST BP <80 MM HG: CPT | Performed by: STUDENT IN AN ORGANIZED HEALTH CARE EDUCATION/TRAINING PROGRAM

## 2025-01-08 PROCEDURE — 99204 OFFICE O/P NEW MOD 45 MIN: CPT | Performed by: STUDENT IN AN ORGANIZED HEALTH CARE EDUCATION/TRAINING PROGRAM

## 2025-01-08 PROCEDURE — 3075F SYST BP GE 130 - 139MM HG: CPT | Performed by: STUDENT IN AN ORGANIZED HEALTH CARE EDUCATION/TRAINING PROGRAM

## 2025-01-08 PROCEDURE — 1125F AMNT PAIN NOTED PAIN PRSNT: CPT | Performed by: STUDENT IN AN ORGANIZED HEALTH CARE EDUCATION/TRAINING PROGRAM

## 2025-01-08 RX ORDER — DIAZEPAM 5 MG/1
TABLET ORAL
COMMUNITY
Start: 2024-12-12

## 2025-01-08 RX ORDER — CELECOXIB 100 MG/1
100 CAPSULE ORAL 2 TIMES DAILY
Qty: 60 CAPSULE | Refills: 1 | Status: SHIPPED | OUTPATIENT
Start: 2025-01-08

## 2025-01-08 ASSESSMENT — PATIENT HEALTH QUESTIONNAIRE - PHQ9
5. POOR APPETITE OR OVEREATING: 0 - NOT AT ALL
SUM OF ALL RESPONSES TO PHQ QUESTIONS 1-9: 3
CLINICAL INTERPRETATION OF PHQ2 SCORE: 1

## 2025-01-08 ASSESSMENT — PAIN SCALES - GENERAL: PAINLEVEL_OUTOF10: 5=MODERATE PAIN

## 2025-01-08 ASSESSMENT — FIBROSIS 4 INDEX: FIB4 SCORE: 1.29

## 2025-01-08 NOTE — PROGRESS NOTES
NEW PATIENT VISIT     Interventional Spine and Pain  Physiatry (Physical Medicine and Rehabilitation)     Date of Service: See Epic  Chief Complaint:   Chief Complaint   Patient presents with    New Patient     Neck pain      Referring Provider: Carola Velez A.P.N.   Patient Name: Hallie Fisher   : 1945   MRN: [id number]     History:     HPI:     Hallie Fisher is a 79 y.o. female with history of MONIK, anxiety who presents to clinic for evaluation of neck pain. Patient reports approximately 3 months of neck pain without inciting injury of change in activity. Pain is typically around 5 to 8 out of 10 in intensity. Pain is located in the bilateral anterolateral neck and into the bilateral upper trapezius. She has tried heat which is not helpful though cold is somewhat beneficial. Pain seems to be the worst at the end of the day and wakes her from sleep, as well as first thing in the morning. She also notes pain with rotating the neck. She does use biofreeze which is somewhat beneficial. She denies any radiation of pain into the upper extremities. She is currently taking gabapentin 100 mg nightly PRN for pain which she does not feel is beneficial. She is scheduled to start physical therapy in March. She denies any numbness, tingling, weakness, balance issues, or recent fever/ chills.        Red Flags ROS:   Fever, Chills, Sweats: Denies  Involuntary Weight Loss: Denies  Bladder Incontinence: Denies  Bowel Incontinence: Denies        PMHx:   Past Medical History:   Diagnosis Date    Anesthesia     PONV    Anxiety 2022    Bereavement 2022    BPPV (benign paroxysmal positional vertigo)     Closed fracture of radius     History of meningioma 2019    Hyperlipidemia     Osteoarthritis of multiple joints 2022    Sleep apnea     CPAP - PMA    Tinnitus of both ears 2023    Torn rotator cuff     right.  has seen Omar and will have praveen in        Current Outpatient  [facility]  URGENT CARE VISIT    CHIEF COMPLAINT:    Chief Complaint   Patient presents with   • UTI       SUBJECTIVE:  Kristen Gupta is a 62 year old female who presented requesting evaluation for dysuria.    Burning with urination and frequency for 3 days.  HA as well.  Tension HA across forehead.  Took azo which did not help.  Was previously on mannose but has been off it due to store running out of it.  Estrogen cream and probiotic.  Has had MDRO E coli in past 9/30/21.  Hx of pyelonephritis in her 20's.  No fever, back pain, nausea or vomiting.  No vaginal discharge. Not sexually active.  No concerns for STDs.       OBJECTIVE:    PAST HISTORIES:  I have reviewed the past medical history, family history, social history, medications and allergies listed in the medical record as obtained by my nursing staff and support staff and agree with their documentation.      PHYSICAL EXAM:    Vital Signs:    Visit Vitals  BP (!) 144/80 (BP Location: LUE - Left upper extremity, Patient Position: Sitting, Cuff Size: Regular)   Pulse 65   Temp 98 °F (36.7 °C) (Oral)   Resp 16   Ht 5' 0.5\" (1.537 m)   Wt 63.5 kg (140 lb)   SpO2 96% Comment: RA   BMI 26.89 kg/m²       Physical Exam  Vitals reviewed.   Constitutional:       General: She is not in acute distress.     Appearance: She is well-developed.   HENT:      Head: Normocephalic and atraumatic.   Eyes:      General: No scleral icterus.        Right eye: No discharge.         Left eye: No discharge.   Cardiovascular:      Rate and Rhythm: Normal rate and regular rhythm.      Heart sounds: No murmur heard.  No friction rub. No gallop.    Pulmonary:      Effort: Pulmonary effort is normal. No respiratory distress.      Breath sounds: No stridor. No wheezing, rhonchi or rales.   Chest:      Chest wall: No tenderness.   Abdominal:      General: Abdomen is flat. There is no distension.      Tenderness: There is abdominal tenderness (diffuse).   Genitourinary:      Labia:         Right: No rash, tenderness, lesion or injury.         Left: No rash, tenderness, lesion or injury.       Vagina: No signs of injury. No vaginal discharge, erythema, tenderness or bleeding.      Comments: Cervix surgically absent  Musculoskeletal:      Cervical back: Normal range of motion and neck supple.   Skin:     General: Skin is warm and dry.   Neurological:      Mental Status: She is alert.   Psychiatric:         Behavior: Behavior normal.         Thought Content: Thought content normal.         Judgment: Judgment normal.            LAB RESULTS/IMAGING:  Results for orders placed or performed in visit on 12/19/21   URINE MICROSCOPIC WITH POINT OF CARE  MACROSCOPIC(DIPSTICK) RESULTS   Result Value    COLOR, URINALYSIS Orange    APPEARANCE, URINALYSIS Hazy    GLUCOSE, URINALYSIS 100  (A)    BILIRUBIN, URINALYSIS Unable to interpret due to interfering substances (A)    KETONES, URINALYSIS Unable to interpret due to interfering substances (A)    SPECIFIC GRAVITY, URINALYSIS 1.010    OCCULT BLOOD, URINALYSIS Trace (A)    PH, URINALYSIS 7.0    PROTEIN, URINALYSIS Unable to interpret due to interfering substances (A)    UROBILINOGEN, URINALYSIS 0.2    NITRITE, URINALYSIS Positive (A)    LEUKOCYTE ESTERASE, URINALYSIS Moderate (A)    SQUAMOUS EPITHELIAL, URINALYSIS None Seen    ERYTHROCYTES, URINALYSIS 1 to 2    LEUKOCYTES, URINALYSIS 11 to 25 (A)    BACTERIA, URINALYSIS Moderate (A)    HYALINE CASTS, URINALYSIS None Seen   WET MOUNT   Result Value    CLUE CELLS, WET MOUNT None Seen    TRICHOMONAS, WET MOUNT None Seen    YEAST, WET MOUNT None Seen          MEDICATIONS GIVEN:        ASSESSMENT/PLAN:  Problem List Items Addressed This Visit     None      Visit Diagnoses     Acute cystitis with hematuria    -  Primary - patient with symptoms of dysuria, frequency and urgency.  Urine shows positive leukocyte and bacteria.  Urine is positive for nitrites as well.  No systemic signs and patient denies any  Medications on File Prior to Visit   Medication Sig Dispense Refill    diazePAM (VALIUM) 5 MG Tab TAKE 1 TAB BY MOUTH EVERY 24 HOURS AS NEEDED FOR ANXIETY FOR UP TO 90 DAYS. 30 TABS TO LAST 90 DAYS      atorvastatin (LIPITOR) 10 MG Tab TAKE 1 TABLET BY MOUTH EVERY 48 HOURS. 100 Tablet 3    sertraline (ZOLOFT) 25 MG tablet Take 1 Tablet by mouth every day. 100 Tablet 3    gabapentin (NEURONTIN) 100 MG Cap Take 1 Capsule by mouth at bedtime as needed (lbp). 100 Capsule 3    Multiple Vitamins-Minerals (CENTRUM SILVER) TABS Take  by mouth every day.      Magnesium 400 MG CAPS Take  by mouth every 48 hours as needed.      Calcium Carbonate-Vitamin D (CALCIUM 600 + D PO) Take  by mouth every day.      Cholecalciferol (VITAMIN D) 2000 UNITS CAPS Take  by mouth every day.       No current facility-administered medications on file prior to visit.        PSHx:   Past Surgical History:   Procedure Laterality Date    OR TOTAL HIP ARTHROPLASTY Left 5/9/2022    Procedure: LEFT ANTERIOR TOTAL HIP ARTHROPLASTY;  Surgeon: Juwan Espino M.D.;  Location: [facility];  Service: Orthopedics    SHOULDER DECOMPRESSION ARTHROSCOPIC Right 3/1/2016    Procedure: SHOULDER DECOMPRESSION ARTHROSCOPIC - SUBACROMIAL, LABRAL DEBRIDMENT;  Surgeon: Shama Nelson M.D.;  Location: [facility];  Service:     SHOULDER ARTHROSCOPY W/ ROTATOR CUFF REPAIR Right 3/1/2016    Procedure: SHOULDER ARTHROSCOPY W/ ROTATOR CUFF REPAIR;  Surgeon: Shama Nelson M.D.;  Location: [facility];  Service:     ORIF, WRIST  9/4/2013    Performed by Prasanna Anthony M.D. at [facility]    CARPAL TUNNEL RELEASE  9/4/2013    Performed by Prasanna Anthony M.D. at [facility]    KNEE ARTHROSCOPY Left 2009     - Dr. Newman; left    LUMPECTOMY Right 1970's    right breast cyst removal    TONSILLECTOMY  as child       Family history   Family History   Problem Relation Age of Onset    Lung Disease  Mother         emphysema    Alcohol/Drug Mother     Alcohol/Drug Father     Stroke Brother          Medications: Reviewed on Epic  Outpatient Medications Marked as Taking for the 1/8/25 encounter (Office Visit) with Kaylin Sinha M.D.   Medication Sig Dispense Refill    diazePAM (VALIUM) 5 MG Tab TAKE 1 TAB BY MOUTH EVERY 24 HOURS AS NEEDED FOR ANXIETY FOR UP TO 90 DAYS. 30 TABS TO LAST 90 DAYS      celecoxib (CELEBREX) 100 MG Cap Take 1 Capsule by mouth 2 times a day. 60 Capsule 1    atorvastatin (LIPITOR) 10 MG Tab TAKE 1 TABLET BY MOUTH EVERY 48 HOURS. 100 Tablet 3    sertraline (ZOLOFT) 25 MG tablet Take 1 Tablet by mouth every day. 100 Tablet 3    gabapentin (NEURONTIN) 100 MG Cap Take 1 Capsule by mouth at bedtime as needed (lbp). 100 Capsule 3    Multiple Vitamins-Minerals (CENTRUM SILVER) TABS Take  by mouth every day.      Magnesium 400 MG CAPS Take  by mouth every 48 hours as needed.      Calcium Carbonate-Vitamin D (CALCIUM 600 + D PO) Take  by mouth every day.      Cholecalciferol (VITAMIN D) 2000 UNITS CAPS Take  by mouth every day.          Allergies:   Allergies   Allergen Reactions    Penicillins Hives       Social Hx:   Social History     Socioeconomic History    Marital status:      Spouse name: Not on file    Number of children: Not on file    Years of education: Not on file    Highest education level: Associate degree: academic program   Occupational History    Not on file   Tobacco Use    Smoking status: Never    Smokeless tobacco: Never   Vaping Use    Vaping status: Never Used   Substance and Sexual Activity    Alcohol use: Not Currently     Comment: one per week or once a month     Drug use: No    Sexual activity: Yes     Partners: Male     Comment: ,  [employer],part time; no kids   Other Topics Concern    Not on file   Social History Narrative    Not on file     Social Drivers of Health     Financial Resource Strain: Low Risk  (9/12/2024)    Overall Financial  signs of pyelonephritis.  Overall, low suspicion for complicated UTI.  Will treat for uncomplicated UTI at this time.  Wet mount pursued as patient did endorse pain within the walls of the vagina.  Overall, normal  exam.  No discharge appreciated.  Wet mount shows no evidence of yeast, Trichomonas or BV.  No further treatment needed for this symptom.  Recommended patient follow-up with her ob provider.    Relevant Medications    ciprofloxacin (CIPRO) 250 MG tablet    Other Relevant Orders    URINE MICROSCOPIC WITH POINT OF CARE  MACROSCOPIC(DIPSTICK) RESULTS (Completed)    WET MOUNT (Completed)    URINE, BACTERIAL CULTURE          Return if symptoms worsen or fail to improve.    Preventative measures, supportive cares, and return precautions for the above diagnoses were discussed with the patient as appropriate. Patient was referred to the AVS for further instructions.    If symptoms persist, worsen, new symptoms emerge, patient does not improve, or patient has any other concerns, they were advised to please follow up in urgent care, emergency room or with PCP. Discussed etiologies and differential with the associated diagnosis/symptoms and common complications. Discussed treatment plan with patient who understands and agrees with plan. Any prescribed medication instructions were discussed with patient and the consequences of not taking the medications. If no prescriptions were given, discussed OTC medication use as delineated in patient instructions.      PPE worn during encounter - Level 3 surgical mask and Gloves    Richar Horton DO       "Resource Strain (CARDIA)     Difficulty of Paying Living Expenses: Not hard at all   Food Insecurity: No Food Insecurity (9/12/2024)    Hunger Vital Sign     Worried About Running Out of Food in the Last Year: Never true     Ran Out of Food in the Last Year: Never true   Transportation Needs: No Transportation Needs (9/12/2024)    PRAPARE - Transportation     Lack of Transportation (Medical): No     Lack of Transportation (Non-Medical): No   Physical Activity: Sufficiently Active (9/12/2024)    Exercise Vital Sign     Days of Exercise per Week: 3 days     Minutes of Exercise per Session: 90 min   Stress: Stress Concern Present (9/12/2024)    [employer] - Occupational Stress Questionnaire     Feeling of Stress : To some extent   Social Connections: Moderately Isolated (9/12/2024)    Social Connection and Isolation Panel [NHANES]     Frequency of Communication with Friends and Family: Twice a week     Frequency of Social Gatherings with Friends and Family: Three times a week     Attends Spiritism Services: Never     Active Member of Clubs or Organizations: No     Attends Club or Organization Meetings: Never     Marital Status:    Intimate Partner Violence: Not on file   Housing Stability: Low Risk  (9/12/2024)    Housing Stability Vital Sign     Unable to Pay for Housing in the Last Year: No     Number of Times Moved in the Last Year: 0     Homeless in the Last Year: No          EXAMINATION     Physical Exam:   Vitals: /51 (BP Location: Right arm, Patient Position: Sitting, BP Cuff Size: Adult)   Pulse 68   Temp 36.6 °C (97.8 °F) (Temporal)   Ht 1.575 m (5' 2\")   Wt 55.8 kg (123 lb 0.3 oz)   SpO2 98%     Constitutional:   Body Habitus: Body mass index is 22.5 kg/m².  Cooperation: Fully cooperates with exam  Appearance: Well-groomed, well-nourished  Eyes: No scleral icterus to suggest severe liver disease, no proptosis to suggest severe hyperthyroid  ENT: No obvious " auditory deficits, no obvious tongue lesions, tongue midline, no facial droop   Respiratory:  Breathing comfortable on room air, no audible wheezing  Cardiovascular: Skin appears well-perfused  Psychiatric: Appropriate affect  Gait: normal gait, no use of ambulatory device, nonantalgic. Mildly impaired tandem gait.    Neurologic:  Strength:    Bilateral UE 5/5 in shoulder abductors, elbow extensors and flexors, wrist extensors, finger abductors, and finger flexors   Reflexes: 2+ in bilateral brachioradialis, biceps, triceps. Negative Rodriguez's bilaterally.  Sensation: grossly intact bilaterally dermatomes C5-T1  MSK:?No?TTP across cervical axial spinous processes and paraspinals or trapezius bilaterally, has?reduced active cervical ROM with pain with extension, bilateral lateral rotation and flexion.      MEDICAL DECISION MAKING    Medical Records Review: See under HPI section    DATA    Labs:   Lab Results   Component Value Date/Time    SODIUM 138 11/12/2024 07:39 AM    POTASSIUM 4.3 11/12/2024 07:39 AM    CHLORIDE 103 11/12/2024 07:39 AM    CO2 26 11/12/2024 07:39 AM    ANION 9.0 11/12/2024 07:39 AM    GLUCOSE 95 11/12/2024 07:39 AM    BUN 11 11/12/2024 07:39 AM    CREATININE 0.69 11/12/2024 07:39 AM    CREATININE 0.78 08/18/2009 01:59 PM    CALCIUM 9.2 11/12/2024 07:39 AM    ASTSGOT 22 11/12/2024 07:39 AM    ALTSGPT 13 11/12/2024 07:39 AM    TBILIRUBIN 0.3 11/12/2024 07:39 AM    ALBUMIN 4.1 11/12/2024 07:39 AM    TOTPROTEIN 6.4 11/12/2024 07:39 AM    GLOBULIN 2.3 11/12/2024 07:39 AM    AGRATIO 1.8 11/12/2024 07:39 AM       Lab Results   Component Value Date/Time    WBC 5.6 08/25/2023 06:05 AM    RBC 4.14 (L) 08/25/2023 06:05 AM    HEMOGLOBIN 12.9 08/25/2023 06:05 AM    HEMATOCRIT 40.0 08/25/2023 06:05 AM    MCV 96.6 08/25/2023 06:05 AM    MCH 31.2 08/25/2023 06:05 AM    MCHC 32.3 08/25/2023 06:05 AM    MPV 9.8 08/25/2023 06:05 AM    NEUTSPOLYS 56.00 08/25/2023 06:05 AM    LYMPHOCYTES 33.90 08/25/2023 06:05 AM     MONOCYTES 7.70 08/25/2023 06:05 AM    EOSINOPHILS 1.30 08/25/2023 06:05 AM    BASOPHILS 0.70 08/25/2023 06:05 AM          Imaging:   I personally reviewed the following images, below are my independent reads:  X-ray cervical spine 10/17/24: Disc height loss most pronounced at C4-5 and C5-6, multilevel facet arthropathy, grade 1 C6-7 anterolisthesis.        IMAGING radiology reads: I reviewed the following radiology reports      Results for orders placed during the hospital encounter of 10/17/24    DX-CERVICAL SPINE-2 OR 3 VIEWS    Impression  Moderate spondylosis and trace degenerative C6/7 anterolisthesis      Diagnosis   Visit Diagnoses     ICD-10-CM   1. Neck pain  M54.2   2. Facet arthropathy, cervical  M47.812         ASSESSMENT AND PLAN:  Hallie Fisher is a 79 y.o. female who presents to clinic for evaluation of neck pain.     Hallie was seen today for new patient.    Diagnoses and all orders for this visit:    Neck pain  -     celecoxib (CELEBREX) 100 MG Cap; Take 1 Capsule by mouth 2 times a day.  -     MR-CERVICAL SPINE-W/O; Future    Facet arthropathy, cervical  -     celecoxib (CELEBREX) 100 MG Cap; Take 1 Capsule by mouth 2 times a day.  -     MR-CERVICAL SPINE-W/O; Future        Patient presents for evaluation of approximately 3 months of moderate to severe bilateral anterior lateral neck and upper trapezius pain without inciting injury or change in activity and without radiation into the bilateral upper extremities.  Given evidence of multilevel facet arthropathy on recent cervical x-rays, I do believe that her pain is most likely secondary to cervical facet arthropathy.  We discussed that the first-line treatment for this condition is conservative management with physical therapy as well as medication.  She scheduled to start physical therapy at the beginning of March and I also provided her with some home exercises to get started in the interim.  I also prescribed her Celebrex 100 mg to be  used twice a day, we discussed that in the long-term this medication does have a risk of causing stomach and kidney issues but at this dose it should be safe on a short-term basis.  In addition given her age and evidence of degenerative changes on recent x-rays, I have ordered a cervical spine MRI in order to rule out evidence of severe spinal stenosis and for consideration of possible cervical medial branch block and radiofrequency ablation should she not note improvement with the above.      Physical Therapy: Patient is scheduled to start physical therapy in March    Home Exercise Program: I provided the patient with a strengthening and stretching with a home exercise program targeting the cervical spine    Diagnostic Workup: As above MRI of the cervical spine    Medications: Celebrex 100 mg twice a day prescribed    Interventional Treatment: I would consider the patient for cervical medial branch blocks pending response to the above    Follow-up: In approximately 6 weeks      Please note that this dictation was created using voice recognition software. I have made every reasonable attempt to correct obvious errors but there may be errors of grammar and content that I may have overlooked prior to finalization of this note.      Kaylin Sinha MD  Physical Medicine and Rehabilitation  Interventional Spine and Sports Physiatry  [facility]       BING [email or web address], [email or web address].   Carola Santos A.P.N..

## 2025-02-17 ENCOUNTER — PHYSICAL THERAPY (OUTPATIENT)
Dept: PHYSICAL THERAPY | Facility: REHABILITATION | Age: 80
End: 2025-02-17
Attending: NURSE PRACTITIONER
Payer: MEDICARE

## 2025-02-17 DIAGNOSIS — G89.29 CHRONIC NECK PAIN: ICD-10-CM

## 2025-02-17 DIAGNOSIS — M54.2 CHRONIC NECK PAIN: ICD-10-CM

## 2025-02-17 PROCEDURE — 97110 THERAPEUTIC EXERCISES: CPT

## 2025-02-17 PROCEDURE — 97161 PT EVAL LOW COMPLEX 20 MIN: CPT

## 2025-02-17 SDOH — ECONOMIC STABILITY: GENERAL: QUALITY OF LIFE: GOOD

## 2025-02-17 ASSESSMENT — ENCOUNTER SYMPTOMS
PAIN TIMING: SPORADIC
QUALITY: ACHING
QUALITY: TIGHTNESS
PAIN SCALE AT LOWEST: 0
QUALITY: DULL ACHE
QUALITY: TIGHT
PAIN SCALE: 3
PAIN SCALE AT HIGHEST: 5

## 2025-02-17 NOTE — OP THERAPY EVALUATION
Outpatient Physical Therapy  INITIAL EVALUATION    [facility]  [address]  [facility] [address]  Phone:  [phone]  Fax:  [phone]    Date of Evaluation: 02/17/2025    Patient: Hallie Fisher  YOB: 1945  MRN: [id number]     Referring Provider: BLAKE Cadena  [id number] [facility] [id number]  [address]  [place] [address]   Referring Diagnosis No admission diagnoses are documented for this encounter.     Time Calculation                 Chief Complaint: Neck Problem    Visit Diagnoses     ICD-10-CM   1. Chronic neck pain  M54.2    G89.29       Date of onset of impairment: 10/17/2024    Subjective:   History of Present Illness:     Date of onset:  10/17/2024    Mechanism of injury:  Pt is a 81 yo female presenting to PT with c/o bilateral c/s pain. Pt reports her neck pain started 4 months ago. Pt reports no LISA just came on insidiously.  Pt reports she was unable to turn her neck without pain. Pt reports she saw a physiatrist due not being able to be seen for PT late last year. Pt reports the physiatrist gave two exercises and pain medication and they have been helping a lot. Pt reports she plays pickle ball and has minimal pain with it. Pt reports she has an MRI for her c/s scheduled at the end of the month. Pt reports no numbness and tingling into the UE ' s. Pt reports she does get vertigo her last episode was late last year.  Pt reports she has always been very active and wants to get back to being able to do more. Pt currently presents with a cold following trip to [place] and is wearing a mask.     PMHx: L THR, R carpal tunnel release, R shoulder RC repair and decompression, and L wrist ORIF    Aggs: turning side to side, sleeping (side sleeper R),   Easers: medication, and exercises  Irritability: moderate    Quality of life:  Good  Headaches:  no headaches  Sleep disturbance:  Difficulty falling asleep (CPAP machine not wearing currently due to  cold)  Pain:     Current pain rating:  3    At best pain ratin    At worst pain ratin (on R side upper c/s)    Quality:  Dull ache, aching, tight and tightness    Pain timing:  Sporadic    Progression:  Improving  Hand dominance:  Right  Diagnostic Tests:     X-ray: abnormal (see below)      Diagnostic Tests Comments:  X-ray cervical spine 10/17/24 Read by Kaylin Sinha M.D: Disc height loss most pronounced at C4-5 and C5-6, multilevel facet arthropathy, grade 1 C6-7 anterolisthesis  Treatments:     Previous treatment:  Medication  Activities of Daily Living:     Patient reported ADL status: Pt is retired. Pt reports all ADL's are independent but some turning (driving) cause some pain and feel limited.   Patient Goals:     Patient goals for therapy:  Increased motion, increased strength and decreased pain      Past Medical History:   Diagnosis Date    Anesthesia     PONV    Anxiety 2022    Bereavement 2022    BPPV (benign paroxysmal positional vertigo)     Closed fracture of radius     History of meningioma 2019    Hyperlipidemia     Osteoarthritis of multiple joints 2022    Sleep apnea     CPAP - PMA    Tinnitus of both ears 2023    Torn rotator cuff     right.  has seen Omar and will have [facility] in      Past Surgical History:   Procedure Laterality Date    [place] TOTAL HIP ARTHROPLASTY Left 2022    Procedure: LEFT ANTERIOR TOTAL HIP ARTHROPLASTY;  Surgeon: Juwan Espino M.D.;  Location: [facility];  Service: Orthopedics    SHOULDER DECOMPRESSION ARTHROSCOPIC Right 3/1/2016    Procedure: SHOULDER DECOMPRESSION ARTHROSCOPIC - SUBACROMIAL, LABRAL DEBRIDMENT;  Surgeon: Shama Nelson M.D.;  Location: [facility];  Service:     SHOULDER ARTHROSCOPY W/ ROTATOR CUFF REPAIR Right 3/1/2016    Procedure: SHOULDER ARTHROSCOPY W/ ROTATOR CUFF REPAIR;  Surgeon: Shama Nelson M.D.;  Location: [facility];  Service:      ORIF, WRIST  9/4/2013    Performed by Prasanna Anthony M.D. at SURGERY [facility]    CARPAL TUNNEL RELEASE  9/4/2013    Performed by Prasanna Anthony M.D. at SURGERY [facility]    KNEE ARTHROSCOPY Left 2009     - Dr. Newman; left    LUMPECTOMY Right 1970's    right breast cyst removal    TONSILLECTOMY  as child     Social History     Tobacco Use    Smoking status: Never    Smokeless tobacco: Never   Substance Use Topics    Alcohol use: Not Currently     Comment: one per week or once a month      Family and Occupational History     Socioeconomic History    Marital status:      Spouse name: Not on file    Number of children: Not on file    Years of education: Not on file    Highest education level: Associate degree: academic program   Occupational History    Not on file       Objective     Observations   Central spine     Positive for forward head/neck.    Postural Observations  Seated posture: fair  Standing posture: fair      Shoulder Screen    Shoulder active range of motion within functional limits.  Shoulder strength within functional limits.    Palpation   Left   Hypertonic in the cervical paraspinals, levator scapulae, scalenes, sternocleidomastoid and upper trapezius.   Tenderness of the cervical paraspinals.   Trigger point to levator scapulae and upper trapezius.     Right   Hypertonic in the cervical paraspinals, levator scapulae, scalenes, sternocleidomastoid and upper trapezius.   Tenderness of the cervical paraspinals.   Trigger point to levator scapulae and upper trapezius.     Active Range of Motion     Cervical Spine   Flexion: within functional limits  Extension: within functional limits (slight deviation to the L)  Left lateral flexion: decreased (stretch on R)  Right lateral flexion: decreased (Stretch on L)  Left rotation: decreased (40 dg)  Right rotation: decreased (40 dg)    Joint Play   Spine     Central PA [place]        C6: hypomobile       C7: hypomobile      Strength:       Left Shoulder   Isolated Muscles   Trapezius (lower): 3+   Trapezius (middle): 4-     Right Shoulder   Isolated Muscles   Trapezius (lower): 3+   Trapezius (middle): 4-     Additional Strength Details  DNF: 7 seconds before losing position     Tests     Left Shoulder   Negative ULTT2.     Right Shoulder   Negative ULTT2.         Therapeutic Exercises (CPT 35569):     1. chin tucks    2. rows    3. SNAGS Rot    4. SNAGS Ext    5. shoulder ext      Therapeutic Exercise Summary: Pt was given HEP prior to leaving and verbalized understanding.    Med [place]:   Access Code: [email or web address]      Time-based treatments/modalities:           Assessment, Response and Plan:   Impairments: limited mobility and pain with function    Assessment details:  Pt is a 81 yo cis-female presenting to PT w/ clinical findings suggestive of bilateral c/s mobility deficit and postural muscle weakness. Pertinent clinical findings include strength deficits in  B lower trapezius, and DNF decrease AROM of the c/s and muscle hypertonicity in parascapular muscles. Impairments are associated w/ decrease functional activity tolerance. The patient would benefit from skilled PT to address strength and ROM deficits in order to increase participation with daily activities. The patient states they understand and agree with the plan of care. HEP w/ handout was reviewed and provided to the pt.    Barriers to therapy:  Age  Prognosis: good    Goals:   Short Term Goals:   1. Pt will be compliant with HEP 3-5x per week for improving ROM, strength and decreasing pain  2. Pt will report neck pain at worst 1/10 while driving and changing lanes in order to improve activity tolerance.  Short term goal time span:  2-4 weeks      Long Term Goals:    1. Pt will demonstrate improved DNF endurance with 20 second improvement or better from assessment  2. Pt will report neck pain 1/10 or better while playing pickle ball 3x/week in order to improve quality of life.   3. Pt  will report improved sleep and decrease time to fall asleep in order to improve sleep quality.     Long term goal time span:  6-8 weeks    Plan:   Therapy options:  Physical therapy treatment to continue  Planned therapy interventions:  Neuromuscular Re-education (CPT 69781), Manual Therapy (CPT 10519), Therapeutic Activities (CPT 85762) and Therapeutic Exercise (CPT 04906)  Frequency:  1x week  Duration in weeks:  8  Duration in visits:  8  Discussed with:  Patient      Functional Assessment Used        Referring provider co-signature:  I have reviewed this plan of care and my co-signature certifies the need for services.    Certification Period: 02/17/2025 to  04/20/25    Physician Signature: ________________________________ Date: ______________

## 2025-02-18 ENCOUNTER — OFFICE VISIT (OUTPATIENT)
Dept: URGENT CARE | Facility: CLINIC | Age: 80
End: 2025-02-18
Payer: MEDICARE

## 2025-02-18 VITALS
HEART RATE: 87 BPM | WEIGHT: 126 LBS | TEMPERATURE: 97 F | SYSTOLIC BLOOD PRESSURE: 112 MMHG | OXYGEN SATURATION: 97 % | DIASTOLIC BLOOD PRESSURE: 58 MMHG | RESPIRATION RATE: 12 BRPM | BODY MASS INDEX: 23.05 KG/M2

## 2025-02-18 DIAGNOSIS — J01.00 ACUTE MAXILLARY SINUSITIS, RECURRENCE NOT SPECIFIED: ICD-10-CM

## 2025-02-18 PROCEDURE — 99213 OFFICE O/P EST LOW 20 MIN: CPT | Performed by: PHYSICIAN ASSISTANT

## 2025-02-18 PROCEDURE — 3074F SYST BP LT 130 MM HG: CPT | Performed by: PHYSICIAN ASSISTANT

## 2025-02-18 PROCEDURE — 3078F DIAST BP <80 MM HG: CPT | Performed by: PHYSICIAN ASSISTANT

## 2025-02-18 RX ORDER — BENZONATATE 200 MG/1
200 CAPSULE ORAL 3 TIMES DAILY PRN
Qty: 30 CAPSULE | Refills: 0 | Status: SHIPPED | OUTPATIENT
Start: 2025-02-18

## 2025-02-18 RX ORDER — DOXYCYCLINE HYCLATE 100 MG
100 TABLET ORAL 2 TIMES DAILY
Qty: 14 TABLET | Refills: 0 | Status: SHIPPED | OUTPATIENT
Start: 2025-02-18 | End: 2025-02-25

## 2025-02-18 ASSESSMENT — FIBROSIS 4 INDEX: FIB4 SCORE: 1.3

## 2025-02-19 ASSESSMENT — ENCOUNTER SYMPTOMS
NAUSEA: 0
HOARSE VOICE: 1
CHILLS: 0
DIARRHEA: 0
COUGH: 1
WHEEZING: 0
SORE THROAT: 0
SINUS PRESSURE: 1
SINUS PAIN: 1
HEADACHES: 1
NECK PAIN: 0
SPUTUM PRODUCTION: 1
MYALGIAS: 0
FEVER: 0
VOMITING: 0
ABDOMINAL PAIN: 0
SHORTNESS OF BREATH: 0

## 2025-02-19 NOTE — PROGRESS NOTES
Subjective     Hallie Fisher is a 80 y.o. female who presents with Cough (X1week ) and Sinus Problem            Sinus Problem  This is a new problem. The current episode started in the past 7 days. The problem has been gradually worsening since onset. There has been no fever. The pain is moderate. Associated symptoms include congestion, coughing, headaches, a hoarse voice and sinus pressure. Pertinent negatives include no chills, ear pain, neck pain, shortness of breath, sneezing or sore throat. Treatments tried: OTC cough/cold medicine. The treatment provided mild relief.       Past Medical History:   Diagnosis Date    Anesthesia     PONV    Anxiety 12/27/2022    Bereavement 11/08/2022    BPPV (benign paroxysmal positional vertigo)     Closed fracture of radius     History of meningioma 11/26/2019    Hyperlipidemia     Osteoarthritis of multiple joints 08/29/2022    Sleep apnea     CPAP - PMA    Tinnitus of both ears 09/07/2023    Torn rotator cuff     right.  has seen Omar and will have [facility] in 1/16       Past Surgical History:   Procedure Laterality Date    [place] TOTAL HIP ARTHROPLASTY Left 5/9/2022    Procedure: LEFT ANTERIOR TOTAL HIP ARTHROPLASTY;  Surgeon: Juwan Espino M.D.;  Location: [facility];  Service: Orthopedics    SHOULDER DECOMPRESSION ARTHROSCOPIC Right 3/1/2016    Procedure: SHOULDER DECOMPRESSION ARTHROSCOPIC - SUBACROMIAL, LABRAL DEBRIDMENT;  Surgeon: Shama Nelson M.D.;  Location: [facility];  Service:     SHOULDER ARTHROSCOPY W/ ROTATOR CUFF REPAIR Right 3/1/2016    Procedure: SHOULDER ARTHROSCOPY W/ ROTATOR CUFF REPAIR;  Surgeon: Shama Nelson M.D.;  Location: [facility];  Service:     ORIF, WRIST  9/4/2013    Performed by Prasanna Anthony M.D. at [facility]    CARPAL TUNNEL RELEASE  9/4/2013    Performed by Prasanna Anthony M.D. at [facility]    KNEE ARTHROSCOPY Left 2009     - Dr. Newman;  left    LUMPECTOMY Right 1970's    right breast cyst removal    TONSILLECTOMY  as child       Family History   Problem Relation Age of Onset    Lung Disease Mother         emphysema    Alcohol/Drug Mother     Alcohol/Drug Father     Stroke Brother      Allergies:  Penicillins    Medications, Allergies, and current problem list reviewed today in Epic      Review of Systems   Constitutional:  Positive for malaise/fatigue. Negative for chills and fever.   HENT:  Positive for congestion, hoarse voice, sinus pressure and sinus pain. Negative for ear pain, sneezing and sore throat.    Respiratory:  Positive for cough and sputum production. Negative for shortness of breath and wheezing.    Cardiovascular:  Negative for chest pain.   Gastrointestinal:  Negative for abdominal pain, diarrhea, nausea and vomiting.   Musculoskeletal:  Negative for myalgias and neck pain.   Neurological:  Positive for headaches.     All other systems reviewed and are negative.              Objective     /58   Pulse 87   Temp 36.1 °C (97 °F) (Temporal)   Resp 12   Wt 57.2 kg (126 lb)   SpO2 97%   BMI 23.05 kg/m²      Physical Exam  Constitutional:       General: She is not in acute distress.     Appearance: She is not ill-appearing.   HENT:      Head: Normocephalic and atraumatic.      Right Ear: Tympanic membrane, ear canal and external ear normal.      Left Ear: Tympanic membrane, ear canal and external ear normal.      Nose: Mucosal edema, congestion and rhinorrhea present.      Right Sinus: Maxillary sinus tenderness present.      Left Sinus: Maxillary sinus tenderness present.      Mouth/Throat:      Mouth: Mucous membranes are moist.      Pharynx: No posterior oropharyngeal erythema.   Eyes:      Conjunctiva/sclera: Conjunctivae normal.   Cardiovascular:      Rate and Rhythm: Normal rate and regular rhythm.      Heart sounds: Normal heart sounds. No murmur heard.  Pulmonary:      Effort: Pulmonary effort is normal. No  respiratory distress.      Breath sounds: Normal breath sounds. No stridor. No wheezing, rhonchi or rales.   Skin:     General: Skin is warm and dry.   Neurological:      General: No focal deficit present.      Mental Status: She is alert and oriented to person, place, and time.   Psychiatric:         Mood and Affect: Mood normal.         Behavior: Behavior normal.         Thought Content: Thought content normal.         Judgment: Judgment normal.                                  Assessment & Plan  Acute maxillary sinusitis, recurrence not specified    Orders:    doxycycline (VIBRAMYCIN) 100 MG Tab; Take 1 Tablet by mouth 2 times a day for 7 days.    benzonatate (TESSALON) 200 MG capsule; Take 1 Capsule by mouth 3 times a day as needed for Cough.     Differential diagnoses, Supportive care, and indications for immediate follow-up discussed with patient.   Pathogenesis of diagnosis discussed including typical length and natural progression.   Instructed to return to clinic or nearest emergency department for any change in condition, further concerns, or worsening of symptoms.      The patient demonstrated a good understanding and agreed with the treatment plan.    Jen Eli P.A.-C.

## 2025-02-23 DIAGNOSIS — M54.2 NECK PAIN: ICD-10-CM

## 2025-02-23 DIAGNOSIS — M47.812 FACET ARTHROPATHY, CERVICAL: ICD-10-CM

## 2025-02-25 RX ORDER — CELECOXIB 100 MG/1
100 CAPSULE ORAL 2 TIMES DAILY
Qty: 60 CAPSULE | Refills: 0 | Status: SHIPPED | OUTPATIENT
Start: 2025-02-25

## 2025-02-25 NOTE — TELEPHONE ENCOUNTER
celecoxib (CELEBREX) 100 MG Cap     Received request via: Pharmacy    Was the patient seen in the last year in this department? Yes    Does the patient have an active prescription (recently filled or refills available) for medication(s) requested?  Yes    Pharmacy Name: [id number]    Does the patient have [facility] and need 100-day supply? (This applies to ALL medications) no 100 day supply requested

## 2025-02-26 ENCOUNTER — OFFICE VISIT (OUTPATIENT)
Dept: SLEEP MEDICINE | Facility: MEDICAL CENTER | Age: 80
End: 2025-02-26
Attending: INTERNAL MEDICINE
Payer: MEDICARE

## 2025-02-26 VITALS
HEIGHT: 62 IN | DIASTOLIC BLOOD PRESSURE: 60 MMHG | SYSTOLIC BLOOD PRESSURE: 132 MMHG | HEART RATE: 72 BPM | BODY MASS INDEX: 22.63 KG/M2 | OXYGEN SATURATION: 96 % | WEIGHT: 123 LBS

## 2025-02-26 DIAGNOSIS — G47.33 OSA (OBSTRUCTIVE SLEEP APNEA): Chronic | ICD-10-CM

## 2025-02-26 PROCEDURE — 99213 OFFICE O/P EST LOW 20 MIN: CPT | Performed by: INTERNAL MEDICINE

## 2025-02-26 ASSESSMENT — ENCOUNTER SYMPTOMS
GASTROINTESTINAL NEGATIVE: 1
CONSTITUTIONAL NEGATIVE: 1
MUSCULOSKELETAL NEGATIVE: 1
PSYCHIATRIC NEGATIVE: 1
EYES NEGATIVE: 1
NEUROLOGICAL NEGATIVE: 1
RESPIRATORY NEGATIVE: 1
CARDIOVASCULAR NEGATIVE: 1

## 2025-02-26 ASSESSMENT — FIBROSIS 4 INDEX: FIB4 SCORE: 1.3

## 2025-02-26 NOTE — ASSESSMENT & PLAN NOTE
The pathophysiology of sleep anea and the increased risk of cardiovascular morbidity from untreated sleep apnea is discussed in detail with the patient.                     Plan              - Continue CPAP  6-12 cmh20               - compliance download was reviewed and discussed with the pt   - Patient is compliant              - compliance was reinforced

## 2025-02-26 NOTE — PROGRESS NOTES
Pulmonary Clinic follow up    Date of Service: 2/26/2025    Reason for follow up:  Follow-Up (MONIK Last seen 01/22/24)      Problem List Items Addressed This Visit       MONIK (obstructive sleep apnea) (Chronic)    The pathophysiology of sleep anea and the increased risk of cardiovascular morbidity from untreated sleep apnea is discussed in detail with the patient.                     Plan              - Continue CPAP  6-12 cmh20               - compliance download was reviewed and discussed with the pt   - Patient is compliant              - compliance was reinforced            Relevant Orders    DME Mask and Supplies         History of Present Illness: Hallie Fisher is a 80 y.o. female with a past medical history of Sleep apnea, hyperlidemia  I last saw patient on telemedicine for her severe sleep apnea 2018 AHI 41.7 and minimum saturation 83%   I last saw her 1/2024  Recent cold so has not been using her CPAP  Autocpap 6-12 cmh20  No symptoms now  No HA  Compliant pre cold and post cold    Review of Systems   Constitutional: Negative.    HENT: Negative.     Eyes: Negative.    Respiratory: Negative.     Cardiovascular: Negative.    Gastrointestinal: Negative.    Genitourinary: Negative.    Musculoskeletal: Negative.    Skin: Negative.    Neurological: Negative.    Endo/Heme/Allergies: Negative.    Psychiatric/Behavioral: Negative.         Current Outpatient Medications on File Prior to Visit   Medication Sig Dispense Refill    celecoxib (CELEBREX) 100 MG Cap TAKE 1 CAPSULE BY MOUTH TWICE A DAY 60 Capsule 0    benzonatate (TESSALON) 200 MG capsule Take 1 Capsule by mouth 3 times a day as needed for Cough. 30 Capsule 0    diazePAM (VALIUM) 5 MG Tab TAKE 1 TAB BY MOUTH EVERY 24 HOURS AS NEEDED FOR ANXIETY FOR UP TO 90 DAYS. 30 TABS TO LAST 90 DAYS      atorvastatin (LIPITOR) 10 MG Tab TAKE 1 TABLET BY MOUTH EVERY 48 HOURS. 100 Tablet 3    sertraline (ZOLOFT) 25 MG tablet Take 1 Tablet by mouth every day. 100  Tablet 3    gabapentin (NEURONTIN) 100 MG Cap Take 1 Capsule by mouth at bedtime as needed (lbp). 100 Capsule 3    Multiple Vitamins-Minerals (CENTRUM SILVER) TABS Take  by mouth every day.      Magnesium 400 MG CAPS Take  by mouth every 48 hours as needed.      Calcium Carbonate-Vitamin D (CALCIUM 600 + D PO) Take  by mouth every day.      Cholecalciferol (VITAMIN D) 2000 UNITS CAPS Take  by mouth every day.       No current facility-administered medications on file prior to visit.       Social History     Tobacco Use    Smoking status: Never    Smokeless tobacco: Never   Vaping Use    Vaping status: Never Used   Substance Use Topics    Alcohol use: Not Currently     Comment: one per week or once a month     Drug use: No        Past Medical History:   Diagnosis Date    Anesthesia     PONV    Anxiety 12/27/2022    Bereavement 11/08/2022    BPPV (benign paroxysmal positional vertigo)     Closed fracture of radius     History of meningioma 11/26/2019    Hyperlipidemia     Osteoarthritis of multiple joints 08/29/2022    Sleep apnea     CPAP - PMA    Tinnitus of both ears 09/07/2023    Torn rotator cuff     right.  has seen Omar and will have [facility] in 1/16       Past Surgical History:   Procedure Laterality Date    [place] TOTAL HIP ARTHROPLASTY Left 5/9/2022    Procedure: LEFT ANTERIOR TOTAL HIP ARTHROPLASTY;  Surgeon: Juwan Espino M.D.;  Location: [facility];  Service: Orthopedics    SHOULDER DECOMPRESSION ARTHROSCOPIC Right 3/1/2016    Procedure: SHOULDER DECOMPRESSION ARTHROSCOPIC - SUBACROMIAL, LABRAL DEBRIDMENT;  Surgeon: Shama Nelson M.D.;  Location: [facility];  Service:     SHOULDER ARTHROSCOPY W/ ROTATOR CUFF REPAIR Right 3/1/2016    Procedure: SHOULDER ARTHROSCOPY W/ ROTATOR CUFF REPAIR;  Surgeon: Shama Nelson M.D.;  Location: [facility];  Service:     ORIF, WRIST  9/4/2013    Performed by Prasanna Anthony M.D. at [facility]     "CARPAL TUNNEL RELEASE  9/4/2013    Performed by Prasanna Anthony M.D. at SURGERY [facility]    KNEE ARTHROSCOPY Left 2009     - Dr. Newman; left    LUMPECTOMY Right 1970's    right breast cyst removal    TONSILLECTOMY  as child       Allergies: Penicillins    Family History   Problem Relation Age of Onset    Lung Disease Mother         emphysema    Alcohol/Drug Mother     Alcohol/Drug Father     Stroke Brother        Vitals:    02/26/25 1521   Height: 1.575 m (5' 2\")   Weight: 55.8 kg (123 lb)   Weight % change since last entry.: 0 %   BP: 132/60   Pulse: 72   BMI (Calculated): 22.5       Physical Examination  Physical Exam  HENT:      Head: Normocephalic and atraumatic.   Eyes:      Pupils: Pupils are equal, round, and reactive to light.   Cardiovascular:      Rate and Rhythm: Normal rate.   Pulmonary:      Effort: Pulmonary effort is normal. No respiratory distress.      Breath sounds: No wheezing.   Skin:     General: Skin is warm and dry.   Neurological:      General: No focal deficit present.      Mental Status: She is alert and oriented to person, place, and time.   Psychiatric:         Mood and Affect: Mood normal.         Behavior: Behavior normal.         Thought Content: Thought content normal.         Judgment: Judgment normal.            Nani Shelton M.D., MD MPH [facility]  [facility]  "

## 2025-02-27 ENCOUNTER — PHYSICAL THERAPY (OUTPATIENT)
Dept: PHYSICAL THERAPY | Facility: REHABILITATION | Age: 80
End: 2025-02-27
Attending: NURSE PRACTITIONER
Payer: MEDICARE

## 2025-02-27 DIAGNOSIS — G89.29 CHRONIC NECK PAIN: ICD-10-CM

## 2025-02-27 DIAGNOSIS — M54.2 CHRONIC NECK PAIN: ICD-10-CM

## 2025-02-27 PROCEDURE — 97110 THERAPEUTIC EXERCISES: CPT

## 2025-02-27 NOTE — OP THERAPY DAILY TREATMENT
"  Outpatient Physical Therapy  DAILY TREATMENT     [facility] [place]  [address]  [facility] [address]  Phone:  [phone]  Fax:  [phone]    Date: 02/27/2025    Patient: Hallie Fisher  YOB: 1945  MRN: [id number]     Time Calculation    Start time: 1415  Stop time: 1500 Time Calculation (min): 45 minutes         Chief Complaint: Neck Problem    Visit #: 2    SUBJECTIVE:  Pt reports she has been doing really since last visit. Pt reports the HEP has helped a lot. Pt reports she is getting closer to be able to discharge from care.     OBJECTIVE:  Current objective measures:   L rot c/s: 50  R rot c/s: 50           Therapeutic Exercises (CPT 76265):     1. chin tucks, 10x10\"    2. rows, 3x10, GTB    3. SNAGS Rot, 2x10 ea    4. SNAGS Ext, 2x10    5. shoulder ext, 3x10, GTB    6. wall clocks, 3x5 ea, OTB    7. D2 flex, 2x10, OTB    8. B ER, 3x10, OTB      Therapeutic Exercise Summary: Pt was given HEP prior to leaving and verbalized understanding.    Med bridge:   Access Code: [email or web address]      Time-based treatments/modalities:    Physical Therapy Timed Treatment Charges  Therapeutic exercise minutes (CPT 01003): 40 minutes          ASSESSMENT:   Response to treatment: Pt tolerated treatment well today. Pt demo improved AROM of the c/s throughout treatment. Pt also demo improved strength and parascapular control. Pt does still demo minimal UT involvement with exercises. Pt will still benefit from skilled physical therapy in order to improve functional activity tolerance.      PLAN/RECOMMENDATIONS:   Plan for treatment: therapy treatment to continue next visit.  Planned interventions for next visit: continue with current treatment.         "

## 2025-03-03 ENCOUNTER — HOSPITAL ENCOUNTER (OUTPATIENT)
Dept: RADIOLOGY | Facility: MEDICAL CENTER | Age: 80
End: 2025-03-03
Attending: STUDENT IN AN ORGANIZED HEALTH CARE EDUCATION/TRAINING PROGRAM
Payer: MEDICARE

## 2025-03-03 DIAGNOSIS — M47.812 FACET ARTHROPATHY, CERVICAL: ICD-10-CM

## 2025-03-03 DIAGNOSIS — M54.2 NECK PAIN: ICD-10-CM

## 2025-03-03 PROCEDURE — 72141 MRI NECK SPINE W/O DYE: CPT

## 2025-03-07 ENCOUNTER — PHYSICAL THERAPY (OUTPATIENT)
Dept: PHYSICAL THERAPY | Facility: REHABILITATION | Age: 80
End: 2025-03-07
Attending: NURSE PRACTITIONER
Payer: MEDICARE

## 2025-03-07 DIAGNOSIS — G89.29 CHRONIC NECK PAIN: ICD-10-CM

## 2025-03-07 DIAGNOSIS — M54.2 CHRONIC NECK PAIN: ICD-10-CM

## 2025-03-07 PROCEDURE — 97110 THERAPEUTIC EXERCISES: CPT

## 2025-03-07 NOTE — OP THERAPY DAILY TREATMENT
"  Outpatient Physical Therapy  DAILY TREATMENT     [facility] [place]  [address], [address]  [facility] [address]  Phone:  [phone]  Fax:  [phone]    Date: 03/07/2025    Patient: Hallie Fisher  YOB: 1945  MRN: [id number]     Time Calculation    Start time: 0945  Stop time: 1030 Time Calculation (min): 45 minutes         Chief Complaint: Neck Problem    Visit #: 3    SUBJECTIVE:  Pt reports the c/s has been feeling a lot better. Pt reports the HEP is going well but she does have some forearm and wrist pain which may be due to pickle ball. Pt reports she is 80-85% back to her normal.     OBJECTIVE:  Current objective measures:           Therapeutic Exercises (CPT 53256):     1. chin tucks, 10x10\"    2. rows, 3x10, GTB    3. SNAGS Rot, 2x10 ea, Reviewed    4. SNAGS Ext, 2x10, Reviewed    5. shoulder ext, 3x10, GTB    6. wall clocks, 3x5 ea, OTB    7. D2 flex, 2x10, OTB    8. B ER, 3x10, OTB    9. UBE, 7 mins    10. B 90/90 with Abd, 3x8, OTB    11. prone I on ball, 3x8    12. prone T on ball, 3x8      Therapeutic Exercise Summary: Pt was given HEP prior to leaving and verbalized understanding.    Med bridge:   Access Code: [email or web address]      Time-based treatments/modalities:    Physical Therapy Timed Treatment Charges  Therapeutic exercise minutes (CPT 53606): 40 minutes          ASSESSMENT:   Response to treatment: Pt tolerated treatment well today. Pt demo improved parascapular control with all exercises. Pt has had consistent progression through POC and discussed possible d/c at next visit if progress continues. However pt will still benefit from skilled physical therapy in order to improve functional activity tolerance.     PLAN/RECOMMENDATIONS:   Plan for treatment: therapy treatment to continue next visit.  Planned interventions for next visit: continue with current treatment.         "

## 2025-03-10 ENCOUNTER — APPOINTMENT (OUTPATIENT)
Dept: PHYSICAL THERAPY | Facility: REHABILITATION | Age: 80
End: 2025-03-10
Attending: NURSE PRACTITIONER
Payer: MEDICARE

## 2025-03-13 NOTE — PROGRESS NOTES
Verbal consent was acquired by the patient to use [employer] ambient listening note generation during this visit Yes     FOLLOW-UP PATIENT VISIT    Interventional Spine and Pain  Physiatry (Physical Medicine and Rehabilitation)     Date of Service: 25   Chief Complaint:   Chief Complaint   Patient presents with    Follow-Up     Neck pain      Patient Name: Hallie Fisher   : 1945   MRN: [id number]       PRIOR HISTORY    Please see new patient note by Dr. Sinha for more details.     Interval History  Patient identification: Hallie Fisher,  1945, with history of MONIK, anxiety, who presents today for follow-up of neck pain. At her last visit she was started on Celebrex 100 mg twice a day.    History of Present Illness  The patient is a 80-year-old female who presents today for follow-up of bilateral neck pain.    She reports significant improvement in her neck pain following the initiation of a prescribed medication and two specific exercises. The sharp pain she previously experienced has been alleviated, although she continues to experience some discomfort during movement in the bilateral cervical paraspinals. She rates her current pain level as 2 out of 10. She reports no adverse effects from the Celebrex medication. She has been attending physical therapy sessions since mid-February, with a total of 3 to 4 sessions completed and one remaining. Her treatment regimen includes the use of Celebrex twice daily and adherence to a set of exercises. She reports no new symptoms such as numbness, tingling, or weakness in the arms. However, she does report occasional morning numbness in her thumb, which she manages with a splint wrist splint and seems to have resolved with this. She has also purchased new pillows and alternates their use.    MEDICATIONS  Celecoxib.        PMHx:   Past Medical History:   Diagnosis Date    Anesthesia     PONV    Anxiety 2022    Bereavement 2022    BPPV  (benign paroxysmal positional vertigo)     Closed fracture of radius     History of meningioma 11/26/2019    Hyperlipidemia     Osteoarthritis of multiple joints 08/29/2022    Sleep apnea     CPAP - PMA    Tinnitus of both ears 09/07/2023    Torn rotator cuff     right.  has seen Omar and will have praveen in 1/16       PSHx:   Past Surgical History:   Procedure Laterality Date    OR TOTAL HIP ARTHROPLASTY Left 5/9/2022    Procedure: LEFT ANTERIOR TOTAL HIP ARTHROPLASTY;  Surgeon: Juwan Espino M.D.;  Location: [facility];  Service: Orthopedics    SHOULDER DECOMPRESSION ARTHROSCOPIC Right 3/1/2016    Procedure: SHOULDER DECOMPRESSION ARTHROSCOPIC - SUBACROMIAL, LABRAL DEBRIDMENT;  Surgeon: Shama Nelson M.D.;  Location: SURGERY [facility];  Service:     SHOULDER ARTHROSCOPY W/ ROTATOR CUFF REPAIR Right 3/1/2016    Procedure: SHOULDER ARTHROSCOPY W/ ROTATOR CUFF REPAIR;  Surgeon: Shama Nelson M.D.;  Location: SURGERY [facility];  Service:     ORIF, WRIST  9/4/2013    Performed by Prasanna Anthony M.D. at [facility]    CARPAL TUNNEL RELEASE  9/4/2013    Performed by Prasanna Anthony M.D. at [facility]    KNEE ARTHROSCOPY Left 2009     - Dr. Newman; left    LUMPECTOMY Right 1970's    right breast cyst removal    TONSILLECTOMY  as child       Family history   Family History   Problem Relation Age of Onset    Lung Disease Mother         emphysema    Alcohol/Drug Mother     Alcohol/Drug Father     Stroke Brother        Medications:   Outpatient Medications Marked as Taking for the 3/19/25 encounter (Office Visit) with Kaylin Sinha M.D.   Medication Sig Dispense Refill    celecoxib (CELEBREX) 100 MG Cap Take 1 Capsule by mouth 2 times a day as needed for Mild Pain or Moderate Pain. 60 Capsule 1    diazePAM (VALIUM) 5 MG Tab TAKE 1 TAB BY MOUTH EVERY 24 HOURS AS NEEDED FOR ANXIETY FOR UP TO 90 DAYS. 30 TABS TO LAST 90 DAYS      atorvastatin  (LIPITOR) 10 MG Tab TAKE 1 TABLET BY MOUTH EVERY 48 HOURS. 100 Tablet 3    sertraline (ZOLOFT) 25 MG tablet Take 1 Tablet by mouth every day. 100 Tablet 3    gabapentin (NEURONTIN) 100 MG Cap Take 1 Capsule by mouth at bedtime as needed (lbp). 100 Capsule 3    Multiple Vitamins-Minerals (CENTRUM SILVER) TABS Take  by mouth every day.      Magnesium 400 MG CAPS Take  by mouth every 48 hours as needed.      Calcium Carbonate-Vitamin D (CALCIUM 600 + D PO) Take  by mouth every day.      Cholecalciferol (VITAMIN D) 2000 UNITS CAPS Take  by mouth every day.          Current Outpatient Medications on File Prior to Visit   Medication Sig Dispense Refill    diazePAM (VALIUM) 5 MG Tab TAKE 1 TAB BY MOUTH EVERY 24 HOURS AS NEEDED FOR ANXIETY FOR UP TO 90 DAYS. 30 TABS TO LAST 90 DAYS      atorvastatin (LIPITOR) 10 MG Tab TAKE 1 TABLET BY MOUTH EVERY 48 HOURS. 100 Tablet 3    sertraline (ZOLOFT) 25 MG tablet Take 1 Tablet by mouth every day. 100 Tablet 3    gabapentin (NEURONTIN) 100 MG Cap Take 1 Capsule by mouth at bedtime as needed (lbp). 100 Capsule 3    Multiple Vitamins-Minerals (CENTRUM SILVER) TABS Take  by mouth every day.      Magnesium 400 MG CAPS Take  by mouth every 48 hours as needed.      Calcium Carbonate-Vitamin D (CALCIUM 600 + D PO) Take  by mouth every day.      Cholecalciferol (VITAMIN D) 2000 UNITS CAPS Take  by mouth every day.      benzonatate (TESSALON) 200 MG capsule Take 1 Capsule by mouth 3 times a day as needed for Cough. 30 Capsule 0     No current facility-administered medications on file prior to visit.       Allergies:   Allergies   Allergen Reactions    Penicillins Hives       Social Hx:   Social History     Socioeconomic History    Marital status:      Spouse name: Not on file    Number of children: Not on file    Years of education: Not on file    Highest education level: Associate degree: academic program   Occupational History    Not on file   Tobacco Use    Smoking status: Never     Smokeless tobacco: Never   Vaping Use    Vaping status: Never Used   Substance and Sexual Activity    Alcohol use: Not Currently     Comment: one per week or once a month     Drug use: No    Sexual activity: Yes     Partners: Male     Comment: ,  [employer],part time; no kids   Other Topics Concern    Not on file   Social History Narrative    Not on file     Social Drivers of Health     Financial Resource Strain: Low Risk  (9/12/2024)    Overall Financial Resource Strain (CARDIA)     Difficulty of Paying Living Expenses: Not hard at all   Food Insecurity: No Food Insecurity (9/12/2024)    Hunger Vital Sign     Worried About Running Out of Food in the Last Year: Never true     Ran Out of Food in the Last Year: Never true   Transportation Needs: No Transportation Needs (9/12/2024)    PRAPARE - Transportation     Lack of Transportation (Medical): No     Lack of Transportation (Non-Medical): No   Physical Activity: Sufficiently Active (9/12/2024)    Exercise Vital Sign     Days of Exercise per Week: 3 days     Minutes of Exercise per Session: 90 min   Stress: Stress Concern Present (9/12/2024)    Citizen of [place] [employer] - Occupational Stress Questionnaire     Feeling of Stress : To some extent   Social Connections: Moderately Isolated (9/12/2024)    Social Connection and Isolation Panel [NHANES]     Frequency of Communication with Friends and Family: Twice a week     Frequency of Social Gatherings with Friends and Family: Three times a week     Attends Adventism Services: Never     Active Member of Clubs or Organizations: No     Attends Club or Organization Meetings: Never     Marital Status:    Intimate Partner Violence: Not on file   Housing Stability: Low Risk  (9/12/2024)    Housing Stability Vital Sign     Unable to Pay for Housing in the Last Year: No     Number of Times Moved in the Last Year: 0     Homeless in the Last Year: No         EXAMINATION     Physical Exam:  "  Vitals: /70 (BP Location: Right arm, Patient Position: Sitting, BP Cuff Size: Adult)   Pulse 69   Temp 36.2 °C (97.2 °F) (Temporal)   Ht 1.575 m (5' 2\")   Wt 58 kg (127 lb 13.9 oz)   SpO2 97%       Constitutional:   Body Habitus: Body mass index is 23.39 kg/m².  Cooperation: Fully cooperates with exam  Appearance: Well-groomed, well-nourished  Respiratory:  Breathing comfortable on room air, no audible wheezing  Cardiovascular: Skin appears well-perfused  Psychiatric: Appropriate affect  Gait: normal gait, no use of ambulatory device, nonantalgic.     Neurologic:  Strength:    Bilateral UE 5/5 in shoulder abductors, elbow extensors and flexors, wrist extensors, finger abductors, and finger flexors   Sensation: grossly intact bilaterally dermatomes C5-T1  MSK:?TTP across cervical paraspinals bilaterally, no TTP bilateral trapezius      MEDICAL DECISION MAKING    DATA    Labs:   Lab Results   Component Value Date/Time    SODIUM 138 11/12/2024 07:39 AM    POTASSIUM 4.3 11/12/2024 07:39 AM    CHLORIDE 103 11/12/2024 07:39 AM    CO2 26 11/12/2024 07:39 AM    GLUCOSE 95 11/12/2024 07:39 AM    BUN 11 11/12/2024 07:39 AM    CREATININE 0.69 11/12/2024 07:39 AM    CREATININE 0.78 08/18/2009 01:59 PM    BUNCREATRAT 12 08/18/2009 01:59 PM    GLOMRATE >59 08/18/2009 01:59 PM        Lab Results   Component Value Date/Time    WBC 5.6 08/25/2023 06:05 AM    RBC 4.14 (L) 08/25/2023 06:05 AM    HEMOGLOBIN 12.9 08/25/2023 06:05 AM    HEMATOCRIT 40.0 08/25/2023 06:05 AM    MCV 96.6 08/25/2023 06:05 AM    MCH 31.2 08/25/2023 06:05 AM    MCHC 32.3 08/25/2023 06:05 AM    MPV 9.8 08/25/2023 06:05 AM    NEUTSPOLYS 56.00 08/25/2023 06:05 AM    LYMPHOCYTES 33.90 08/25/2023 06:05 AM    MONOCYTES 7.70 08/25/2023 06:05 AM    EOSINOPHILS 1.30 08/25/2023 06:05 AM    BASOPHILS 0.70 08/25/2023 06:05 AM        Imaging:   I personally reviewed the following images, below are my independent reads:  MRI Cervical Spine 3/3/25: Disc bulges at " C3-4, C4-5, C5-6, C6-7. C3-4 mild canal and bilateral foraminal stenosis. C4-5 moderate canal and severe bilateral foraminal stenosis. C5-6 moderate canal and severe bilateral foraminal stenosis. C6-7 mild to moderate canal stenosis. Multilevel facet and uncovertebral joint arthropathy.        I reviewed the following radiology reports  MRI Cervical Spine 3/3/25:  FINDINGS:  Vertebral body height and alignment is well maintained throughout. There is no evidence of marrow edema. There is multilevel loss of the normal disc height and bright T2 disc signal. The spinal cord is normal in caliber and signal. There is mild anterior   subluxation at C4-5 and C6-7.     Findings at specific levels:     C2-3: No significant central canal or neural foraminal narrowing.     C3-4: There is annular disc bulge with posterior osseous spurring and bilateral uncinate and facet degeneration. There is underlying cord contact with mild central canal narrowing. There is mild bilateral neuroforaminal narrowing.     C4-5: There is annular disc bulge with posterior osseous spurring and bilateral uncinate and facet degeneration. There is underlying cord contact with moderate central canal narrowing. There is severe bilateral neural foraminal narrowing.     C5-6: There is annular disc bulge with posterior osseous spurring and bilateral uncinate and facet degeneration. There is underlying cord contact. There is moderate central canal narrowing. There is severe bilateral neural foraminal narrowing.     C6-7: There is annular disc bulge and posterior osseous spurring and bilateral uncinate and facet degeneration. This underlying cord contact. There is moderate central canal narrowing. There is no neural foraminal narrowing.     C7-T1: No significant central canal or neural foraminal narrowing.     IMPRESSION:     1.  Multilevel degenerative disc disease, uncinate and facet degeneration results in mild central canal narrowing at C3-4 and moderate  central canal narrowing at C4-5, C5-6 and C6-7.     2.  Varying degrees of neural foraminal narrowing at levels as specifically described above.    DIAGNOSIS   Visit Diagnoses     ICD-10-CM   1. Neck pain  M54.2   2. Facet arthropathy, cervical  M47.812         ASSESSMENT and PLAN:     Hallie Fisher is a 80 y.o. female who returns to clinic for follow-up of neck pain.    Hallie was seen today for follow-up.    Diagnoses and all orders for this visit:    Neck pain  -     celecoxib (CELEBREX) 100 MG Cap; Take 1 Capsule by mouth 2 times a day as needed for Mild Pain or Moderate Pain.    Facet arthropathy, cervical  -     celecoxib (CELEBREX) 100 MG Cap; Take 1 Capsule by mouth 2 times a day as needed for Mild Pain or Moderate Pain.        Assessment & Plan  Bilateral neck pain  Cervical facet arthropathy  Patient presents for follow-up of bilateral neck pain. Her condition has shown significant improvement with initiation of PT, home exercises, and Celebrex, with pain levels reduced to a manageable 2 out of 10. The MRI results indicate the presence of arthritis and narrowing at several levels in the cervical region, which are likely contributing factors to her discomfort. Reassuringly, she is neurologically intact on exam today. She has been advised to gradually reduce her Celebrex intake, potentially eliminating the evening dose on days when she experiences minimal pain. This strategy aims to mitigate potential side effects on renal and gastrointestinal function. Should her pain levels increase, she may resume the twice-daily dosage. The possibility of administering trigger point injections into the affected muscles was discussed, which could enhance her range of motion and promote increased blood flow to the area. This option will be reconsidered during future consultations.      Follow up: In 4 weeks    Thank you for allowing me to participate in the care of this patient. If you have any questions please not  hesitate to contact me.         Please note that this dictation was created using voice recognition software. I have made every reasonable attempt to correct obvious errors but there may be errors of grammar and content that I may have overlooked prior to finalization of this note.    Kaylin Sinha MD  Interventional Spine and Sports Physiatry  Physical Medicine and Rehabilitation  [facility]

## 2025-03-17 ENCOUNTER — APPOINTMENT (OUTPATIENT)
Dept: PHYSICAL THERAPY | Facility: REHABILITATION | Age: 80
End: 2025-03-17
Attending: NURSE PRACTITIONER
Payer: MEDICARE

## 2025-03-19 ENCOUNTER — APPOINTMENT (OUTPATIENT)
Dept: PHYSICAL MEDICINE AND REHAB | Facility: MEDICAL CENTER | Age: 80
End: 2025-03-19
Payer: MEDICARE

## 2025-03-19 VITALS
HEART RATE: 69 BPM | BODY MASS INDEX: 23.53 KG/M2 | WEIGHT: 127.87 LBS | TEMPERATURE: 97.2 F | OXYGEN SATURATION: 97 % | HEIGHT: 62 IN | SYSTOLIC BLOOD PRESSURE: 138 MMHG | DIASTOLIC BLOOD PRESSURE: 70 MMHG

## 2025-03-19 DIAGNOSIS — M54.2 NECK PAIN: Primary | ICD-10-CM

## 2025-03-19 DIAGNOSIS — M47.812 FACET ARTHROPATHY, CERVICAL: ICD-10-CM

## 2025-03-19 PROCEDURE — 3078F DIAST BP <80 MM HG: CPT | Performed by: STUDENT IN AN ORGANIZED HEALTH CARE EDUCATION/TRAINING PROGRAM

## 2025-03-19 PROCEDURE — 99214 OFFICE O/P EST MOD 30 MIN: CPT | Performed by: STUDENT IN AN ORGANIZED HEALTH CARE EDUCATION/TRAINING PROGRAM

## 2025-03-19 PROCEDURE — 3075F SYST BP GE 130 - 139MM HG: CPT | Performed by: STUDENT IN AN ORGANIZED HEALTH CARE EDUCATION/TRAINING PROGRAM

## 2025-03-19 PROCEDURE — 1125F AMNT PAIN NOTED PAIN PRSNT: CPT | Performed by: STUDENT IN AN ORGANIZED HEALTH CARE EDUCATION/TRAINING PROGRAM

## 2025-03-19 RX ORDER — CELECOXIB 100 MG/1
100 CAPSULE ORAL 2 TIMES DAILY PRN
Qty: 60 CAPSULE | Refills: 1 | Status: SHIPPED | OUTPATIENT
Start: 2025-03-19

## 2025-03-19 ASSESSMENT — PAIN SCALES - GENERAL: PAINLEVEL_OUTOF10: 2=MINIMAL-SLIGHT

## 2025-03-19 ASSESSMENT — PATIENT HEALTH QUESTIONNAIRE - PHQ9: CLINICAL INTERPRETATION OF PHQ2 SCORE: 0

## 2025-03-19 ASSESSMENT — FIBROSIS 4 INDEX: FIB4 SCORE: 1.3

## 2025-03-20 ENCOUNTER — PATIENT MESSAGE (OUTPATIENT)
Dept: HEALTH INFORMATION MANAGEMENT | Facility: OTHER | Age: 80
End: 2025-03-20

## 2025-03-24 ENCOUNTER — PHYSICAL THERAPY (OUTPATIENT)
Dept: PHYSICAL THERAPY | Facility: REHABILITATION | Age: 80
End: 2025-03-24
Attending: NURSE PRACTITIONER
Payer: MEDICARE

## 2025-03-24 DIAGNOSIS — M54.2 CHRONIC NECK PAIN: ICD-10-CM

## 2025-03-24 DIAGNOSIS — G89.29 CHRONIC NECK PAIN: ICD-10-CM

## 2025-03-24 PROCEDURE — 97110 THERAPEUTIC EXERCISES: CPT

## 2025-03-24 PROCEDURE — 97140 MANUAL THERAPY 1/> REGIONS: CPT

## 2025-03-24 NOTE — OP THERAPY DAILY TREATMENT
"  Outpatient Physical Therapy  DAILY TREATMENT     [facility] [place]  [address], [address]  [place] [address]  Phone:  [phone]  Fax:  [phone]    Date: 03/24/2025    Patient: Hallie Fisher  YOB: 1945  MRN: [id number]     Time Calculation    Start time: 0850  Stop time: 0935 Time Calculation (min): 45 minutes         Chief Complaint: Neck Pain    Visit #: 4    SUBJECTIVE:  Pt reports she is doing well she reports she is 90% back to her normal. Pt reports she has numbness in the R thumb upon waking in the morning. Pt reports the numbness will radiate down the R c/s to the R thumb. Pt reports she can manage the thumb pain with wearing a brace at night.     OBJECTIVE:  Current objective measures: See Progress note.           Therapeutic Exercises (CPT 36251):     1. chin tucks, 10x10\"    2. rows, 3x10, GTB    3. SNAGS Rot, 2x10 ea, Reviewed    4. SNAGS Ext, 2x10, Reviewed    5. shoulder ext, 3x10, GTB    6. wall clocks, 3x5 ea, OTB    7. D2 flex, 2x10, OTB Hold    8. B ER, 3x10, OTB    9. UBE, 7 mins    10. B 90/90 with Abd, 3x8, OTB hold    11. prone I on ball, 3x8, NT    12. prone T on ball, 3x8, NT    13. theracane, 3 mins    14. median nerve glide, low tensioner      Therapeutic Exercise Summary: Pt was given HEP prior to leaving and verbalized understanding.    Med bridge:   Access Code: [email or web address]    Therapeutic Treatments and Modalities:     1. Manual Therapy (CPT 06570), STM to LS, UT, Rhomboids, pec major/minor. Grade 2-4 upglides of c3-c7, c/s distraction    Time-based treatments/modalities:    Physical Therapy Timed Treatment Charges  Manual therapy minutes (CPT 30792): 15 minutes  Therapeutic exercise minutes (CPT 14017): 30 minutes          ASSESSMENT:   Response to treatment: See progress note.     PLAN/RECOMMENDATIONS:   Plan for treatment: therapy treatment to continue next visit.  Planned interventions for next visit: continue with current " treatment.

## 2025-03-24 NOTE — OP THERAPY PROGRESS SUMMARY
Outpatient Physical Therapy  PROGRESS SUMMARY NOTE      [facility] [place]  [address], [address]  [place] [place] [address]  Phone:  [phone]  Fax:  [phone]    Date of Visit: 03/24/2025    Patient: Hallie Fisher  YOB: 1945  MRN: [id number]     Referring Provider: BLAKE Cadena  No address on file   Referring Diagnosis Cervicalgia [M54.2];Other chronic pain [G89.29]     Visit Diagnoses     ICD-10-CM   1. Chronic neck pain  M54.2    G89.29       Rehab Potential: good    Progress Report Period: 2/17/2025-3/24/2025    Functional Assessment Used          Objective Findings and Assessment:   Patient progression towards goals: Pt demo overall functional improvements. Demo improved strength, AROM of c/s, and functional activity tolerance. Impairments continue to include increased neural tension in the R median nerve due to increase hypertonicity of c/s muscles either due to increase workload due to  surgery and stress. The patient will benefit from continued skilled therapy with focus on strength and ROM deficits in order to increase participation with daily tasks. The pt states she understands and agrees to the POC.      Objective findings and assessment details: Observations   Central spine     Positive for forward head/neck.     Postural Observations  Seated posture: fair  Standing posture: fair        Shoulder Screen    Shoulder active range of motion within functional limits.  Shoulder strength within functional limits.     Palpation   Left   Hypertonic in the cervical paraspinals, levator scapulae, scalenes, sternocleidomastoid and upper trapezius.   Tenderness of the cervical paraspinals.   Trigger point to levator scapulae and upper trapezius.      Right   Hypertonic in the cervical paraspinals, levator scapulae, scalenes, sternocleidomastoid and upper trapezius.   Tenderness of the cervical paraspinals.   Trigger point to levator scapulae and upper  trapezius.      Active Range of Motion      Cervical Spine   Flexion: within functional limits  Extension: within functional limits (slight deviation to the L)  Left lateral flexion: decreased (stretch on R)  Right lateral flexion: decreased (Stretch on L)  Left rotation: decreased (40 dg)  Right rotation: decreased (40 dg)     Joint Play   Spine     Central PA Ollie        C6: hypomobile       C7: hypomobile        Strength:       Left Shoulder   Isolated Muscles   Trapezius (lower): 4-  Trapezius (middle): 4-      Right Shoulder   Isolated Muscles   Trapezius (lower): 4-  Trapezius (middle): 4-      Additional Strength Details  DNF: 10 seconds before losing position      Tests   C/s compression (-)   C/s distraction (+)      Left Shoulder   Negative ULTT2.      Right Shoulder   Positive ULTT2     strength   R 40lbs   L 40lbs   Over 3 trials       Goals:   Short Term Goals:   1. Pt will be compliant with HEP 3-5x per week for improving ROM, strength and decreasing pain Met   2. Pt will report neck pain at worst 1/10 while driving and changing lanes in order to improve activity tolerance.Not met   3. Pt will report decreased numbness in R 1st digit in order to improve sleep. (NEW GOAL)    Short term goal time span:  2-4 weeks      Long Term Goals:    1. Pt will demonstrate improved DNF endurance with 20 second improvement or better from assessment not met   2. Pt will report neck pain 1/10 or better while playing pickle ball 3x/week in order to improve quality of life. Met   3. Pt will report improved sleep and decrease time to fall asleep in order to improve sleep quality. Not met  Long term goal time span:  6-8 weeks    Plan:   Planned therapy interventions:  Therapeutic Activities (CPT 84734), Therapeutic Exercise (CPT 32441), Manual Therapy (CPT 25596) and Neuromuscular Re-education (CPT 38898)  Frequency:  1x week  Duration in weeks:  8  Duration in visits:  8      Referring provider co-signature:  I have  reviewed this plan of care and my co-signature certifies the need for services.     Certification Period: 03/24/2025 to 05/19/25    Physician Signature: ________________________________ Date: ______________

## 2025-03-31 ENCOUNTER — PHYSICAL THERAPY (OUTPATIENT)
Dept: PHYSICAL THERAPY | Facility: REHABILITATION | Age: 80
End: 2025-03-31
Attending: NURSE PRACTITIONER
Payer: MEDICARE

## 2025-03-31 DIAGNOSIS — M54.2 CHRONIC NECK PAIN: ICD-10-CM

## 2025-03-31 DIAGNOSIS — M54.50 ACUTE RIGHT-SIDED LOW BACK PAIN WITHOUT SCIATICA: ICD-10-CM

## 2025-03-31 DIAGNOSIS — G89.29 CHRONIC NECK PAIN: ICD-10-CM

## 2025-03-31 PROCEDURE — 97110 THERAPEUTIC EXERCISES: CPT

## 2025-03-31 NOTE — OP THERAPY DAILY TREATMENT
Outpatient Physical Therapy  DAILY TREATMENT     [facility] [place]  [address], [address]  [place] [address]  Phone:  [phone]  Fax:  [phone]    Date: 03/31/2025    Patient: Hallie Fisher  YOB: 1945  MRN: [id number]     Time Calculation    Start time: 0850  Stop time: 0925 Time Calculation (min): 35 minutes         Chief Complaint: Shoulder Problem and Neck Pain    Visit #: 5    SUBJECTIVE:  Pt reports she has been doing really well since last visit. Pt reports she has made great progress through POC. Pt reports no pain in the c/s or BUE.     OBJECTIVE:  Shoulder Screen    Shoulder active range of motion within functional limits.  Shoulder strength within functional limits.     Active Range of Motion      Cervical Spine   Flexion: within functional limits  Extension: within functional limits   Left lateral flexion: Within functional limits   Right lateral flexion: Within functional limits  Left rotation: Within functional limits  Right rotation: Within functional limits     Strength:       Left Shoulder   Isolated Muscles   Trapezius (lower): 4  Trapezius (middle): 4     Right Shoulder   Isolated Muscles   Trapezius (lower): 4  Trapezius (middle): 4     Additional Strength Details  DNF: 14 seconds before losing position      Left Shoulder   Negative ULTT2.      Right Shoulder   Negative ULTT2      Goals:   Short Term Goals:   1. Pt will be compliant with HEP 3-5x per week for improving ROM, strength and decreasing pain Met   2. Pt will report neck pain at worst 1/10 while driving and changing lanes in order to improve activity tolerance. Met   3. Pt will report decreased numbness in R 1st digit in order to improve sleep. Met     Short term goal time span:  2-4 weeks      Long Term Goals:    1. Pt will demonstrate improved DNF endurance with 20 second improvement or better from assessment Progressing (   2. Pt will report neck pain 1/10 or better while playing  "pickle ball 3x/week in order to improve quality of life. Met   3. Pt will report improved sleep and decrease time to fall asleep in order to improve sleep quality.  Met   Long term goal time span:  6-8 weeks  Neck Disability Total: 0  Quickdash General Total Score: 0       Therapeutic Exercises (CPT 26989):     1. chin tucks, 10x10\"    2. rows, 3x10, BTB    3. SNAGS Rot, 2x10 ea, Reviewed    4. SNAGS Ext, 2x10, Reviewed    5. shoulder ext, 3x10, BTB    6. wall clocks, 3x5 ea, OTB    7. D2 flex, 2x10, OTB Hold    8. B ER, 3x10, OTB    9. UBE, 7 mins    10. B 90/90 with Abd, 3x8, OTB hold    11. prone I on ball, 3x8, NT    12. prone T on ball, 3x8, NT    13. theracane, 3 mins    14. median nerve glide, low tensioner    16. Objective measures      Therapeutic Exercise Summary: Pt was given HEP prior to leaving and verbalized understanding.    Med bridge:   Access Code: [email or web address]    Therapeutic Treatments and Modalities:     1. Manual Therapy (CPT 15021), STM to LS, UT, Rhomboids, pec major/minor. Grade 2-4 upglides of c3-c7, c/s distraction Not today    Time-based treatments/modalities:    Physical Therapy Timed Treatment Charges  Therapeutic exercise minutes (CPT 25793): 35 minutes          ASSESSMENT:   Response to treatment: Pt tolerated treatment well today. Pt has demo improved c/s AROM, increased BUE strength, and increased reported functional activity tolerance. Pt has met all goals and is to be discharged from care at this time. PT dicussed importance of maintaining HEP program. Pt verbalized understanding prior to leaving.     PLAN/RECOMMENDATIONS:   Plan for treatment: therapy treatment to continue next visit.  Planned interventions for next visit: continue with current treatment.         "

## 2025-03-31 NOTE — OP THERAPY DISCHARGE SUMMARY
Outpatient Physical Therapy  DISCHARGE SUMMARY NOTE      [facility] [place]  [address], [address]  [place] [address]  Phone:  [phone]  Fax:  [phone]    Date of Visit: 03/31/2025    Patient: Hallie Fisher  YOB: 1945  MRN: [id number]     Referring Provider: BLAKE Cadena  No address on file   Referring Diagnosis Cervicalgia [M54.2];Other chronic pain [G89.29]         Functional Assessment Used  Neck Disability Total: 0  Quickdash General Total Score: 0     Your patient is being discharged from Physical Therapy with the following comments:   Goals met    Comments:  Pt has attended 5 visits from 2/17/2025 to 3/31/2025. Pt has met all functional goals and is appropriate for DC. Reviewed HEP. Pt demo full independence and proper mechanics of all exercises.       Limitations Remaining:  N/a    Recommendations:  Continue with HEP and physical activity     Malick Lawson, PT    Date: 3/31/2025

## 2025-04-07 ENCOUNTER — APPOINTMENT (OUTPATIENT)
Dept: PHYSICAL THERAPY | Facility: REHABILITATION | Age: 80
End: 2025-04-07
Attending: NURSE PRACTITIONER
Payer: MEDICARE

## 2025-04-14 ENCOUNTER — APPOINTMENT (OUTPATIENT)
Dept: PHYSICAL THERAPY | Facility: REHABILITATION | Age: 80
End: 2025-04-14
Attending: NURSE PRACTITIONER
Payer: MEDICARE

## 2025-04-17 NOTE — PROGRESS NOTES
Verbal consent was acquired by the patient to use [employer] ambient listening note generation during this visit Yes     FOLLOW-UP PATIENT VISIT    Interventional Spine and Pain  Physiatry (Physical Medicine and Rehabilitation)     Date of Service: 25   Chief Complaint:   Chief Complaint   Patient presents with    Follow-Up     1 mo fv       Patient Name: Hallie Fisher   : 1945   MRN: [id number]       PRIOR HISTORY    Please see new patient note by Dr. Sinha for more details.     Interval History  Patient identification: Hallie Fisher,  1945, with history of MONIK, anxiety, who presents today for follow-up of neck pain.  Patient reports that since she was seen approximately 1 month ago her symptoms of bilateral neck pain located primarily at the base of the skull have been largely stable.  She has now completed physical therapy and continues to do her home exercises prescribed by them on a daily basis.  She does note some improvement in her neck range of motion since starting physical therapy, reports that pain has been largely the same, typically around 3 out of 10 in intensity and feeling like a tightness and aching.  She continues to deny any radiation of pain into the upper extremities.  Her symptoms of intermittent right thumb numbness have resolved since working with physical therapy as well.  She has been taking Celebrex 100 mg once a day in the morning as well as 1 tablet of Advil PM at night.  She denies any new issues with her balance or bowel or bladder changes.      PMHx:   Past Medical History:   Diagnosis Date    Anesthesia     PONV    Anxiety 2022    Bereavement 2022    BPPV (benign paroxysmal positional vertigo)     Closed fracture of radius     History of meningioma 2019    Hyperlipidemia     Osteoarthritis of multiple joints 2022    Sleep apnea     CPAP - PMA    Tinnitus of both ears 2023    Torn rotator cuff     right.  has seen  Omar and will have surgeyr in 1/16       PSHx:   Past Surgical History:   Procedure Laterality Date    HI TOTAL HIP ARTHROPLASTY Left 5/9/2022    Procedure: LEFT ANTERIOR TOTAL HIP ARTHROPLASTY;  Surgeon: Juwan Espino M.D.;  Location: [facility] [place];  Service: Orthopedics    SHOULDER DECOMPRESSION ARTHROSCOPIC Right 3/1/2016    Procedure: SHOULDER DECOMPRESSION ARTHROSCOPIC - SUBACROMIAL, LABRAL DEBRIDMENT;  Surgeon: Shama Nelson M.D.;  Location: SURGERY [facility];  Service:     SHOULDER ARTHROSCOPY W/ ROTATOR CUFF REPAIR Right 3/1/2016    Procedure: SHOULDER ARTHROSCOPY W/ ROTATOR CUFF REPAIR;  Surgeon: Shama Nelson M.D.;  Location: SURGERY [facility];  Service:     ORIF, WRIST  9/4/2013    Performed by Prasanna Anthony M.D. at [facility]    CARPAL TUNNEL RELEASE  9/4/2013    Performed by Prasanna Anthony M.D. at [facility]    KNEE ARTHROSCOPY Left 2009     - Dr. Newman; left    LUMPECTOMY Right 1970's    right breast cyst removal    TONSILLECTOMY  as child       Family history   Family History   Problem Relation Age of Onset    Lung Disease Mother         emphysema    Alcohol/Drug Mother     Alcohol/Drug Father     Stroke Brother        Medications:   Outpatient Medications Marked as Taking for the 4/18/25 encounter (Office Visit) with Kaylin Sinha M.D.   Medication Sig Dispense Refill    celecoxib (CELEBREX) 100 MG Cap Take 1 Capsule by mouth 2 times a day as needed for Mild Pain or Moderate Pain. 60 Capsule 1    diazePAM (VALIUM) 5 MG Tab TAKE 1 TAB BY MOUTH EVERY 24 HOURS AS NEEDED FOR ANXIETY FOR UP TO 90 DAYS. 30 TABS TO LAST 90 DAYS      atorvastatin (LIPITOR) 10 MG Tab TAKE 1 TABLET BY MOUTH EVERY 48 HOURS. 100 Tablet 3    sertraline (ZOLOFT) 25 MG tablet Take 1 Tablet by mouth every day. 100 Tablet 3    Multiple Vitamins-Minerals (CENTRUM SILVER) TABS Take  by mouth every day.      Magnesium 400 MG CAPS Take  by mouth every 48  hours as needed.      Calcium Carbonate-Vitamin D (CALCIUM 600 + D PO) Take  by mouth every day.      Cholecalciferol (VITAMIN D) 2000 UNITS CAPS Take  by mouth every day.          Current Outpatient Medications on File Prior to Visit   Medication Sig Dispense Refill    celecoxib (CELEBREX) 100 MG Cap Take 1 Capsule by mouth 2 times a day as needed for Mild Pain or Moderate Pain. 60 Capsule 1    diazePAM (VALIUM) 5 MG Tab TAKE 1 TAB BY MOUTH EVERY 24 HOURS AS NEEDED FOR ANXIETY FOR UP TO 90 DAYS. 30 TABS TO LAST 90 DAYS      atorvastatin (LIPITOR) 10 MG Tab TAKE 1 TABLET BY MOUTH EVERY 48 HOURS. 100 Tablet 3    sertraline (ZOLOFT) 25 MG tablet Take 1 Tablet by mouth every day. 100 Tablet 3    Multiple Vitamins-Minerals (CENTRUM SILVER) TABS Take  by mouth every day.      Magnesium 400 MG CAPS Take  by mouth every 48 hours as needed.      Calcium Carbonate-Vitamin D (CALCIUM 600 + D PO) Take  by mouth every day.      Cholecalciferol (VITAMIN D) 2000 UNITS CAPS Take  by mouth every day.       No current facility-administered medications on file prior to visit.       Allergies:   Allergies   Allergen Reactions    Penicillins Hives       Social Hx:   Social History     Socioeconomic History    Marital status:      Spouse name: Not on file    Number of children: Not on file    Years of education: Not on file    Highest education level: Associate degree: academic program   Occupational History    Not on file   Tobacco Use    Smoking status: Never    Smokeless tobacco: Never   Vaping Use    Vaping status: Never Used   Substance and Sexual Activity    Alcohol use: Not Currently     Comment: one per week or once a month     Drug use: No    Sexual activity: Yes     Partners: Male     Comment: ,  [employer],part time; no kids   Other Topics Concern    Not on file   Social History Narrative    Not on file     Social Drivers of Health     Financial Resource Strain: Low Risk  (9/12/2024)    Overall  "Financial Resource Strain (CARDIA)     Difficulty of Paying Living Expenses: Not hard at all   Food Insecurity: No Food Insecurity (9/12/2024)    Hunger Vital Sign     Worried About Running Out of Food in the Last Year: Never true     Ran Out of Food in the Last Year: Never true   Transportation Needs: No Transportation Needs (9/12/2024)    PRAPARE - Transportation     Lack of Transportation (Medical): No     Lack of Transportation (Non-Medical): No   Physical Activity: Sufficiently Active (9/12/2024)    Exercise Vital Sign     Days of Exercise per Week: 3 days     Minutes of Exercise per Session: 90 min   Stress: Stress Concern Present (9/12/2024)    [employer] [place] [employer] - Occupational Stress Questionnaire     Feeling of Stress : To some extent   Social Connections: Moderately Isolated (9/12/2024)    Social Connection and Isolation Panel [NHANES]     Frequency of Communication with Friends and Family: Twice a week     Frequency of Social Gatherings with Friends and Family: Three times a week     Attends Gnosticist Services: Never     Active Member of Clubs or Organizations: No     Attends Club or Organization Meetings: Never     Marital Status:    Intimate Partner Violence: Not on file   Housing Stability: Low Risk  (9/12/2024)    Housing Stability Vital Sign     Unable to Pay for Housing in the Last Year: No     Number of Times Moved in the Last Year: 0     Homeless in the Last Year: No         EXAMINATION     Physical Exam:   Vitals: /52 (BP Location: Left arm, Patient Position: Sitting, BP Cuff Size: Adult)   Pulse 61   Temp 36.7 °C (98.1 °F) (Temporal)   Ht 1.575 m (5' 2\")   Wt 58.2 kg (128 lb 4.9 oz)   SpO2 96%       Constitutional:   Body Habitus: Body mass index is 23.47 kg/m².  Cooperation: Fully cooperates with exam  Appearance: Well-groomed, well-nourished  Respiratory:  Breathing comfortable on room air, no audible wheezing  Cardiovascular: Skin appears " well-perfused  Psychiatric: Appropriate affect  Gait: normal gait, no use of ambulatory device, nonantalgic.     MSK:?TTP bilateral cervical paraspinals      MEDICAL DECISION MAKING    DATA    Labs:   Lab Results   Component Value Date/Time    SODIUM 138 11/12/2024 07:39 AM    POTASSIUM 4.3 11/12/2024 07:39 AM    CHLORIDE 103 11/12/2024 07:39 AM    CO2 26 11/12/2024 07:39 AM    GLUCOSE 95 11/12/2024 07:39 AM    BUN 11 11/12/2024 07:39 AM    CREATININE 0.69 11/12/2024 07:39 AM    CREATININE 0.78 08/18/2009 01:59 PM    BUNCREATRAT 12 08/18/2009 01:59 PM    GLOMRATE >59 08/18/2009 01:59 PM        Lab Results   Component Value Date/Time    WBC 5.6 08/25/2023 06:05 AM    RBC 4.14 (L) 08/25/2023 06:05 AM    HEMOGLOBIN 12.9 08/25/2023 06:05 AM    HEMATOCRIT 40.0 08/25/2023 06:05 AM    MCV 96.6 08/25/2023 06:05 AM    MCH 31.2 08/25/2023 06:05 AM    MCHC 32.3 08/25/2023 06:05 AM    MPV 9.8 08/25/2023 06:05 AM    NEUTSPOLYS 56.00 08/25/2023 06:05 AM    LYMPHOCYTES 33.90 08/25/2023 06:05 AM    MONOCYTES 7.70 08/25/2023 06:05 AM    EOSINOPHILS 1.30 08/25/2023 06:05 AM    BASOPHILS 0.70 08/25/2023 06:05 AM            Imaging:   Prior personal imaging review:  MRI Cervical Spine 3/3/25: Disc bulges at C3-4, C4-5, C5-6, C6-7. C3-4 mild canal and bilateral foraminal stenosis. C4-5 moderate canal and severe bilateral foraminal stenosis. C5-6 moderate canal and severe bilateral foraminal stenosis. C6-7 mild to moderate canal stenosis. Multilevel facet and uncovertebral joint arthropathy.        I reviewed the following radiology reports  MRI Cervical Spine 3/3/25:  FINDINGS:  Vertebral body height and alignment is well maintained throughout. There is no evidence of marrow edema. There is multilevel loss of the normal disc height and bright T2 disc signal. The spinal cord is normal in caliber and signal. There is mild anterior   subluxation at C4-5 and C6-7.     Findings at specific levels:     C2-3: No significant central canal or  neural foraminal narrowing.     C3-4: There is annular disc bulge with posterior osseous spurring and bilateral uncinate and facet degeneration. There is underlying cord contact with mild central canal narrowing. There is mild bilateral neuroforaminal narrowing.     C4-5: There is annular disc bulge with posterior osseous spurring and bilateral uncinate and facet degeneration. There is underlying cord contact with moderate central canal narrowing. There is severe bilateral neural foraminal narrowing.     C5-6: There is annular disc bulge with posterior osseous spurring and bilateral uncinate and facet degeneration. There is underlying cord contact. There is moderate central canal narrowing. There is severe bilateral neural foraminal narrowing.     C6-7: There is annular disc bulge and posterior osseous spurring and bilateral uncinate and facet degeneration. This underlying cord contact. There is moderate central canal narrowing. There is no neural foraminal narrowing.     C7-T1: No significant central canal or neural foraminal narrowing.     IMPRESSION:     1.  Multilevel degenerative disc disease, uncinate and facet degeneration results in mild central canal narrowing at C3-4 and moderate central canal narrowing at C4-5, C5-6 and C6-7.     2.  Varying degrees of neural foraminal narrowing at levels as specifically described above.      DIAGNOSIS   Visit Diagnoses     ICD-10-CM   1. Myalgia  M79.10   2. Cervicalgia  M54.2         ASSESSMENT and PLAN:     Hallie Fisher is a 80 y.o. female who returns to clinic for follow-up of neck pain.    Hallie was seen today for follow-up.    Diagnoses and all orders for this visit:    Myalgia  -     Trigger Point Injections; Future    Cervicalgia        Patient presents for follow-up of chronic moderate bilateral neck pain primarily located in the base of the skull and bilateral cervical paraspinals, without radiation into the upper extremities.  Given lack of  improvement despite dedicated physical therapy and ongoing home exercise program, we discussed the option of trigger point injections to target the myofascial component of her pain given tenderness to palpation on exam today, and patient is interested in pursuing this option.  We discussed the risks of these injections including but not limited to bleeding, infection, damage to surrounding structures and patient wishes to proceed.  For now she will also continue Celebrex 100 mg in the morning as well as 1 tablet of Advil PM at nighttime for pain control.      Follow up: For ultrasound-guided trigger point injections targeting the bilateral cervical paraspinals, then 3 weeks after injections in clinic    Thank you for allowing me to participate in the care of this patient. If you have any questions please not hesitate to contact me.         Please note that this dictation was created using voice recognition software. I have made every reasonable attempt to correct obvious errors but there may be errors of grammar and content that I may have overlooked prior to finalization of this note.    Kaylin Sinha MD  Interventional Spine and Sports Physiatry  Physical Medicine and Rehabilitation  [facility]

## 2025-04-18 ENCOUNTER — APPOINTMENT (OUTPATIENT)
Dept: PHYSICAL MEDICINE AND REHAB | Facility: MEDICAL CENTER | Age: 80
End: 2025-04-18
Payer: MEDICARE

## 2025-04-18 VITALS
WEIGHT: 128.31 LBS | HEIGHT: 62 IN | SYSTOLIC BLOOD PRESSURE: 123 MMHG | DIASTOLIC BLOOD PRESSURE: 52 MMHG | TEMPERATURE: 98.1 F | OXYGEN SATURATION: 96 % | HEART RATE: 61 BPM | BODY MASS INDEX: 23.61 KG/M2

## 2025-04-18 DIAGNOSIS — M54.2 CERVICALGIA: ICD-10-CM

## 2025-04-18 DIAGNOSIS — M79.10 MYALGIA: Primary | ICD-10-CM

## 2025-04-18 PROCEDURE — 1125F AMNT PAIN NOTED PAIN PRSNT: CPT | Performed by: STUDENT IN AN ORGANIZED HEALTH CARE EDUCATION/TRAINING PROGRAM

## 2025-04-18 PROCEDURE — 99214 OFFICE O/P EST MOD 30 MIN: CPT | Performed by: STUDENT IN AN ORGANIZED HEALTH CARE EDUCATION/TRAINING PROGRAM

## 2025-04-18 PROCEDURE — 3074F SYST BP LT 130 MM HG: CPT | Performed by: STUDENT IN AN ORGANIZED HEALTH CARE EDUCATION/TRAINING PROGRAM

## 2025-04-18 PROCEDURE — 3078F DIAST BP <80 MM HG: CPT | Performed by: STUDENT IN AN ORGANIZED HEALTH CARE EDUCATION/TRAINING PROGRAM

## 2025-04-18 ASSESSMENT — PAIN SCALES - GENERAL: PAINLEVEL_OUTOF10: 3=SLIGHT PAIN

## 2025-04-18 ASSESSMENT — PATIENT HEALTH QUESTIONNAIRE - PHQ9: CLINICAL INTERPRETATION OF PHQ2 SCORE: 0

## 2025-04-18 ASSESSMENT — FIBROSIS 4 INDEX: FIB4 SCORE: 1.3

## 2025-04-21 ENCOUNTER — APPOINTMENT (OUTPATIENT)
Dept: PHYSICAL THERAPY | Facility: REHABILITATION | Age: 80
End: 2025-04-21
Attending: NURSE PRACTITIONER
Payer: MEDICARE

## 2025-04-25 NOTE — PROCEDURES
Date of Service: 4/25/2025     Physician/s: Kaylin Sinha MD    Pre-operative Diagnosis: Myalgia (M79.1)    Post-operative Diagnosis: Myalgia (M79.1)    Procedure: Right trapezius muscle, right cervical paraspinals, left trapezius muscle, left cervical paraspinals *** trigger point injections    Description of procedure:    The risks, benefits, and alternatives of the procedure were reviewed and discussed with the patient.  Written informed consent was freely obtained. A pre-procedural time-out was conducted by the physician verifying patient’s identity, procedure to be performed, procedure site and side, and allergy verification. Appropriate equipment was determined to be in place for the procedure.     In the office suite exam room the patient was placed in a prone position and the skin areas for injection over the {right trapezius muscle, right rhomboid, left trapezius muscle, left rhomboid} was marked. A solution was prepared with 1 mL of 4 mg/mL of dexamethasone, 4.5 mL of 1% lidocaine and 4.5 mL of 0.5% bupivicaine. The areas of pain was then prepped and draped in the usual sterile fashion. Ultrasound was confirmed to view the adjacent structures for blood vessels and nerves and to confirm the needle path was not within the structures nor was it too deep to be within the lung. A 27g needle was placed into each of the markings at the areas above under ultrasound guidance with an in plane approach.. After negative aspiration, approximately 1 mL of the above solution was injected. The needle was removed intact after each trigger point injection, and the patient's back was covered with a 4x4 gauze, the area was cleansed with sterile normal saline, and a dressing was applied. There were no complications noted. The images were uploaded to our media tab for permanent storage.    Preprocedural pain: ***/10  Postprocedural pain: ***/10    Kaylin Sinha MD  Physical Medicine and Rehabilitation  Interventional Spine and  Sports Physiatry  [facility]

## 2025-04-28 ENCOUNTER — APPOINTMENT (OUTPATIENT)
Dept: PHYSICAL THERAPY | Facility: REHABILITATION | Age: 80
End: 2025-04-28
Attending: NURSE PRACTITIONER
Payer: MEDICARE

## 2025-05-02 ENCOUNTER — APPOINTMENT (OUTPATIENT)
Dept: PHYSICAL MEDICINE AND REHAB | Facility: MEDICAL CENTER | Age: 80
End: 2025-05-02
Attending: STUDENT IN AN ORGANIZED HEALTH CARE EDUCATION/TRAINING PROGRAM
Payer: MEDICARE

## 2025-05-02 VITALS
SYSTOLIC BLOOD PRESSURE: 153 MMHG | HEIGHT: 62 IN | TEMPERATURE: 97.5 F | WEIGHT: 127.21 LBS | OXYGEN SATURATION: 97 % | HEART RATE: 67 BPM | DIASTOLIC BLOOD PRESSURE: 56 MMHG | BODY MASS INDEX: 23.41 KG/M2

## 2025-05-02 DIAGNOSIS — M79.10 MYALGIA: Primary | ICD-10-CM

## 2025-05-02 PROCEDURE — 3078F DIAST BP <80 MM HG: CPT | Performed by: STUDENT IN AN ORGANIZED HEALTH CARE EDUCATION/TRAINING PROGRAM

## 2025-05-02 PROCEDURE — 76942 ECHO GUIDE FOR BIOPSY: CPT | Performed by: STUDENT IN AN ORGANIZED HEALTH CARE EDUCATION/TRAINING PROGRAM

## 2025-05-02 PROCEDURE — 3077F SYST BP >= 140 MM HG: CPT | Performed by: STUDENT IN AN ORGANIZED HEALTH CARE EDUCATION/TRAINING PROGRAM

## 2025-05-02 PROCEDURE — 20552 NJX 1/MLT TRIGGER POINT 1/2: CPT | Performed by: STUDENT IN AN ORGANIZED HEALTH CARE EDUCATION/TRAINING PROGRAM

## 2025-05-02 PROCEDURE — 1125F AMNT PAIN NOTED PAIN PRSNT: CPT | Performed by: STUDENT IN AN ORGANIZED HEALTH CARE EDUCATION/TRAINING PROGRAM

## 2025-05-02 RX ORDER — DEXAMETHASONE SODIUM PHOSPHATE 4 MG/ML
4 INJECTION, SOLUTION INTRA-ARTICULAR; INTRALESIONAL; INTRAMUSCULAR; INTRAVENOUS; SOFT TISSUE ONCE
Status: COMPLETED | OUTPATIENT
Start: 2025-05-02 | End: 2025-05-02

## 2025-05-02 RX ORDER — BUPIVACAINE HYDROCHLORIDE 5 MG/ML
4.5 INJECTION, SOLUTION EPIDURAL; INTRACAUDAL; PERINEURAL ONCE
Status: COMPLETED | OUTPATIENT
Start: 2025-05-02 | End: 2025-05-02

## 2025-05-02 RX ADMIN — BUPIVACAINE HYDROCHLORIDE 4.5 ML: 5 INJECTION, SOLUTION EPIDURAL; INTRACAUDAL; PERINEURAL at 15:35

## 2025-05-02 RX ADMIN — DEXAMETHASONE SODIUM PHOSPHATE 4 MG: 4 INJECTION, SOLUTION INTRA-ARTICULAR; INTRALESIONAL; INTRAMUSCULAR; INTRAVENOUS; SOFT TISSUE at 15:34

## 2025-05-02 RX ADMIN — Medication 4.5 ML: at 15:34

## 2025-05-02 ASSESSMENT — FIBROSIS 4 INDEX: FIB4 SCORE: 1.3

## 2025-05-02 ASSESSMENT — PAIN SCALES - GENERAL: PAINLEVEL_OUTOF10: 4=SLIGHT-MODERATE PAIN

## 2025-05-05 ENCOUNTER — APPOINTMENT (OUTPATIENT)
Dept: PHYSICAL THERAPY | Facility: REHABILITATION | Age: 80
End: 2025-05-05
Attending: NURSE PRACTITIONER
Payer: MEDICARE

## 2025-05-07 ENCOUNTER — HOSPITAL ENCOUNTER (EMERGENCY)
Facility: MEDICAL CENTER | Age: 80
End: 2025-05-07
Attending: EMERGENCY MEDICINE
Payer: MEDICARE

## 2025-05-07 VITALS
TEMPERATURE: 97.5 F | HEIGHT: 61 IN | RESPIRATION RATE: 14 BRPM | WEIGHT: 128.31 LBS | OXYGEN SATURATION: 95 % | HEART RATE: 68 BPM | SYSTOLIC BLOOD PRESSURE: 158 MMHG | DIASTOLIC BLOOD PRESSURE: 70 MMHG | BODY MASS INDEX: 24.22 KG/M2

## 2025-05-07 DIAGNOSIS — M54.16 LUMBAR RADICULOPATHY: ICD-10-CM

## 2025-05-07 PROCEDURE — 99284 EMERGENCY DEPT VISIT MOD MDM: CPT

## 2025-05-07 RX ORDER — METHYLPREDNISOLONE 4 MG/1
TABLET ORAL
Qty: 21 TABLET | Refills: 0 | Status: SHIPPED | OUTPATIENT
Start: 2025-05-07 | End: 2025-05-23

## 2025-05-07 ASSESSMENT — FIBROSIS 4 INDEX: FIB4 SCORE: 1.3

## 2025-05-07 NOTE — ED PROVIDER NOTES
ER Provider Note    Scribed for Keshawn Carrillo M.D. by Radu Choe. 5/7/2025   8:02 AM    Primary Care Provider: BLAKE Cadena    CHIEF COMPLAINT  Chief Complaint   Patient presents with    Hip Pain     Left  Started three days ago after playing Pickle Ball  Denies any falls or known injuries       EXTERNAL RECORDS REVIEWED  Inpatient Notes Patient had a hip replacement in 2022.    HPI/ROS  LIMITATION TO HISTORY   Select: : None  OUTSIDE HISTORIAN(S):  None    Hallie Fisher is a 80 y.o. female with a history of hip replacement who presents to the ED for evaluation of left hip pain onset 2 days ago. Patient states she was playing pickle ball, as she usually does. Following play, she had tightness and pain in her left hip, which began as light pain but progressively worsened. She describes the pain as wrapping around toward the back. She states that the pain is exacerbated by leaning over and mentions that the pain occasionally radiates down her leg. She mentions slight of alleviation of pain by applying a heating pad. The patient also constipation last week, going 5 days without a bowel movement. Symptoms eventually subsided, and she states she now has pellet-like bowel movements. Patient denies diabetes.    PAST MEDICAL HISTORY  Past Medical History:   Diagnosis Date    Anesthesia     PONV    Anxiety 12/27/2022    Bereavement 11/08/2022    BPPV (benign paroxysmal positional vertigo)     Closed fracture of radius     History of meningioma 11/26/2019    Hyperlipidemia     Osteoarthritis of multiple joints 08/29/2022    Sleep apnea     CPAP - PMA    Tinnitus of both ears 09/07/2023    Torn rotator cuff     right.  has seen Omar and will have surgeyr in 1/16       SURGICAL HISTORY  Past Surgical History:   Procedure Laterality Date    MA TOTAL HIP ARTHROPLASTY Left 5/9/2022    Procedure: LEFT ANTERIOR TOTAL HIP ARTHROPLASTY;  Surgeon: Juwan Espino M.D.;  Location: [facility]  [place];  Service: Orthopedics    SHOULDER DECOMPRESSION ARTHROSCOPIC Right 3/1/2016    Procedure: SHOULDER DECOMPRESSION ARTHROSCOPIC - SUBACROMIAL, LABRAL DEBRIDMENT;  Surgeon: Shama Nelson M.D.;  Location: SURGERY [facility];  Service:     SHOULDER ARTHROSCOPY W/ ROTATOR CUFF REPAIR Right 3/1/2016    Procedure: SHOULDER ARTHROSCOPY W/ ROTATOR CUFF REPAIR;  Surgeon: Shama Nelson M.D.;  Location: SURGERY [facility];  Service:     ORIF, WRIST  9/4/2013    Performed by Prasanna Anthony M.D. at SURGERY [facility]    CARPAL TUNNEL RELEASE  9/4/2013    Performed by Prasanna Anthony M.D. at [facility]    KNEE ARTHROSCOPY Left 2009     - Dr. Newman; left    LUMPECTOMY Right 1970's    right breast cyst removal    TONSILLECTOMY  as child       FAMILY HISTORY  Family History   Problem Relation Age of Onset    Lung Disease Mother         emphysema    Alcohol/Drug Mother     Alcohol/Drug Father     Stroke Brother        SOCIAL HISTORY   reports that she has never smoked. She has never used smokeless tobacco. She reports current alcohol use. She reports that she does not use drugs.    CURRENT MEDICATIONS  Discharge Medication List as of 5/7/2025  8:12 AM        CONTINUE these medications which have NOT CHANGED    Details   celecoxib (CELEBREX) 100 MG Cap Take 1 Capsule by mouth 2 times a day as needed for Mild Pain or Moderate Pain., Disp-60 Capsule, R-1, Normal      diazePAM (VALIUM) 5 MG Tab TAKE 1 TAB BY MOUTH EVERY 24 HOURS AS NEEDED FOR ANXIETY FOR UP TO 90 DAYS. 30 TABS TO LAST 90 DAYS, Historical Med      atorvastatin (LIPITOR) 10 MG Tab TAKE 1 TABLET BY MOUTH EVERY 48 HOURS., Disp-100 Tablet, R-3, Normal      sertraline (ZOLOFT) 25 MG tablet Take 1 Tablet by mouth every day., Disp-100 Tablet, R-3, Normal      Multiple Vitamins-Minerals (CENTRUM SILVER) TABS Take  by mouth every day., Historical Med      Magnesium 400 MG CAPS Take  by mouth every 48 hours as needed., Historical  "Med      Calcium Carbonate-Vitamin D (CALCIUM 600 + D PO) Take  by mouth every day., Historical Med      Cholecalciferol (VITAMIN D) 2000 UNITS CAPS Take  by mouth every day., Historical Med             ALLERGIES  Allergies   Allergen Reactions    Penicillins Hives        PHYSICAL EXAM  /53   Pulse 64   Temp 36.4 °C (97.5 °F) (Temporal)   Resp 20   Ht 1.549 m (5' 1\")   Wt 58.2 kg (128 lb 4.9 oz)   SpO2 96%   BMI 24.24 kg/m²    Constitutional: Well developed, Well nourished, mild distress secondary to pain, Non-toxic appearance.   Neck: Normal range of motion, No tenderness, Supple, No stridor.   Cardiovascular: Normal heart rate, Normal rhythm, No murmurs, No rubs, No gallops. No pulsatile masses.  Thorax & Lungs: Normal breath sounds, No respiratory distress, No wheezing, No chest tenderness.   Abdomen: Bowel sounds normal, Soft, No tenderness, No masses, No pulsatile masses.   Skin: Warm, Dry, No erythema, No rash.   Back: No midline TTP, no point tenderness, the patient has mild tenderness to palpation of the lumbar paraspinal muscles on the left. Positive straight leg raise at 30 degrees. No hip tenderness. Full range of motion of left hip.  Extremities: Intact distal pulses, No edema, No tenderness, No cyanosis, No clubbing.   Neurologic: Alert & oriented x 3, Normal motor function, Normal sensory function, No focal deficits noted. No saddle anesthesia.     ASSESSMENT AND PLAN    8:02 AM - Patient was evaluated at bedside. Patient presents for left hip pain secondary to playing pickle ball. Informed patient of my diagnosis of lumbar radiculopathy, and discussed at-home treatment and medication with Medrol. Discussed discharge instructions and return precautions with the patient and they were cleared for discharge. Patient was given the opportunity to ask any further questions. Patient is comfortable with discharge at this time.     At this point the patient presents with low back pain. The " patient's physical exam is essentially benign. There is no evidence of cord impingement. No evidence of cauda equina. Clinically and historically there is no concern for epidural abscess, or epidural hematoma. There is no history of recent trauma. Patient has had these symptoms previously. At this time I feel that the most appropriate treatment for what is apparently mechanical low back pain with his pain control. I will prescribe Medrol for pain.    DISPOSITION AND DISCUSSIONS    I have discussed management of the patient with the following physicians and ALDO's:  None    Discussion of management with other [facility] or appropriate source(s): None     The patient will return for new or worsening symptoms and is stable at the time of discharge.    DISPOSITION:  Patient will be discharged home in stable condition.    FOLLOW UP:  ASHISH CadenaP.N.  [address]   [address]  [facility] [id number] [phone]    Schedule an appointment as soon as possible for a visit       [facility], Emergency Dept  [address]  [facility] [id number] [phone]    If symptoms worsen      OUTPATIENT MEDICATIONS:  Discharge Medication List as of 5/7/2025  8:12 AM        START taking these medications    Details   methylPREDNISolone (MEDROL) 4 MG Tab Take as per the package instructions., Disp-21 Tablet, R-0, Normal               FINAL DIAGNOSIS  1. Lumbar radiculopathy         Radu OSORIO (Airam), am scribing for, and in the presence of, Keshawn Carrillo M.D..    Electronically signed by: Radu Castillo), 5/7/2025    Keshawn OSORIO M.D. personally performed the services described in this documentation, as scribed by Radu Choe in my presence, and it is both accurate and complete.      The note accurately reflects work and decisions made by me.  Keshawn Carrillo M.D.  5/7/2025  12:52 PM

## 2025-05-07 NOTE — ED NOTES
Pt ambulated to ER with c/o Left hip pain x3 days and constipation x3 days.   Pt states she did not fall.   Pt states she had a left hip replacement in 2022.   Pt states she had a normal BM this morning.

## 2025-05-07 NOTE — ED TRIAGE NOTES
"Chief Complaint   Patient presents with    Hip Pain     Left  Started three days ago after playing Pickle Ball  Denies any falls or known injuries       /53   Pulse 64   Temp 36.4 °C (97.5 °F) (Temporal)   Resp 20   Ht 1.549 m (5' 1\")   Wt 58.2 kg (128 lb 4.9 oz)   SpO2 96%   BMI 24.24 kg/m²     Pt ambulated to ED by self for c/o Left Hip pain s/p playing pickle ball three days ago.  "

## 2025-05-07 NOTE — ED NOTES
Reviewed discharge instructions and prescription x 1 sent to selected pharmacy w/ pt, verbalized understanding to information provided including follow up care w/ PCP, return precautions and medication, denied questions/concerns.  Pt ambulated from ED w/o difficulty.

## 2025-05-12 ENCOUNTER — APPOINTMENT (OUTPATIENT)
Dept: PHYSICAL THERAPY | Facility: REHABILITATION | Age: 80
End: 2025-05-12
Attending: NURSE PRACTITIONER
Payer: MEDICARE

## 2025-05-14 ENCOUNTER — OFFICE VISIT (OUTPATIENT)
Dept: DERMATOLOGY | Facility: IMAGING CENTER | Age: 80
End: 2025-05-14
Payer: MEDICARE

## 2025-05-14 DIAGNOSIS — B07.8 OTHER VIRAL WARTS: Primary | ICD-10-CM

## 2025-05-14 DIAGNOSIS — L82.0 INFLAMED SEBORRHEIC KERATOSIS: ICD-10-CM

## 2025-05-14 DIAGNOSIS — D18.01 CHERRY ANGIOMA: ICD-10-CM

## 2025-05-14 DIAGNOSIS — L81.4 LENTIGINES: ICD-10-CM

## 2025-05-14 DIAGNOSIS — D22.9 MULTIPLE NEVI: ICD-10-CM

## 2025-05-14 DIAGNOSIS — L82.1 SEBORRHEIC KERATOSIS: ICD-10-CM

## 2025-05-14 PROCEDURE — 17110 DESTRUCTION B9 LES UP TO 14: CPT | Performed by: STUDENT IN AN ORGANIZED HEALTH CARE EDUCATION/TRAINING PROGRAM

## 2025-05-14 PROCEDURE — 99203 OFFICE O/P NEW LOW 30 MIN: CPT | Mod: 25 | Performed by: STUDENT IN AN ORGANIZED HEALTH CARE EDUCATION/TRAINING PROGRAM

## 2025-05-14 NOTE — PROGRESS NOTES
[facility] DERMATOLOGY CLINIC NOTE    Chief Complaint   Patient presents with    Establish Care     Upper body exam         HPI:    Hallie Fisher is a 80 y.o. female here for evaluation of above, upper body skin exam    HPI/location: rt cheek   Time present: 1 year   Painful lesion: No  Itching lesion: No  Enlarging lesion: No  Anything make it better or worse?       No other symptomatic (itching, painful, burning) or changing lesions.       Dermatology History:      - Prior history of skin cancer: no     - Family history of skin cancer: no      Review of Systems: No fevers, chill. Pertinent positives and negatives above.       Medications, Medical History, Surgical History, Family History & Allergies:  Reviewed in the chart, relevant history noted above.       PHYSICAL EXAM  Upper body skin check of the scalp, face, neck, trunk, upper extremities was performed.   Skin type 2    - tan to erythematous scaly stuck on papule on the rt cheek, Lt upper back, rt mid back   - 5mm grey verrucous papules on the rt corner lip     - scattered melanotic macules and papules on the trunk and extremities  - scattered tan macules without surface scale on sun exposed areas of face, neck, upper chest, arms  - scattered tan waxy to gray stuck on papules and plaques on the trunk and extremities  - scattered 2-3mm red papules on trunk, extremities    ASSESSMENT & PLAN    # Inflamed seborrheic keratosis  - reassured benign, but given lesion(s) symptomatic, treatment with cryotherapy discussed and patient amenable.  CRYOTHERAPY:  Risks (including, but not limited to: hypo or hyperpigmentation, redness, blister, blood blister, recurrence, need for further treatment, infection, scar) and benefits of cryotherapy discussed. Patient verbally agreed to proceed with treatment. Cryotherapy freeze thaw cycles of 5-10 seconds were applied.  - LN2 x 5 lesion(s).  Patient tolerated procedure well. Aftercare instructions given.      #  Warts  Discussed benign lesions with viral etiology. Treatment can include cryotherapy. Multiple treatment usually needed.  CRYOTHERAPY:  Risks (including, but not limited to: hypo or hyperpigmentation, redness, blister, blood blister, recurrence, need for further treatment, infection, scar) and benefits of cryotherapy discussed. Patient verbally agreed to proceed with treatment. Cryotherapy freeze thaw cycles of 5-10 seconds were applied.  - LN2 x 1 lesion(s) for 3 cycles.  Patient tolerated procedure well. Aftercare instructions given.   - Recommend at home treatment with otc Wart Stick nightly, take breaks as needed for irritation     # Melanotic nevi  - clinically benign appearing today  - ABCDEs of atypical appearing moles discussed.     # Lentigines  - discussed this is a result of sun exposure, photodamage  - regular sun protective practices with hats/clothing, SPF 30+ with regular application and reapplication recommended    # Seborrheic keratosis  # Cherry angioma  - reassure benign, no treatment needed      Skin Cancer Prevention Counseling   Advise regular sun protection/sunscreen use, SPF 30 or greater, recommend mineral sunscreen, and to reapply every  minutes.   Recommend broad brimmed hats, UPF sun protective clothing when outdoors for extended periods of time   Recommend self checks at home,  ABCDEs of melanoma discussed       Return in about 6 weeks (around 6/25/2025) for Wart., annual skin check      Leida Salgado MD  [facility]

## 2025-05-16 ENCOUNTER — APPOINTMENT (OUTPATIENT)
Dept: PHYSICAL MEDICINE AND REHAB | Facility: MEDICAL CENTER | Age: 80
End: 2025-05-16
Payer: MEDICARE

## 2025-05-22 NOTE — PROGRESS NOTES
Verbal consent was acquired by the patient to use [employer] ambient listening note generation during this visit Yes     FOLLOW-UP PATIENT VISIT    Interventional Spine and Pain  Physiatry (Physical Medicine and Rehabilitation)     Date of Service: 25   Chief Complaint:   Chief Complaint   Patient presents with    Follow-Up      Patient Name: Hallie Fisher   : 1945   MRN: [id number]       PRIOR HISTORY    Please see new patient note by Dr. Sinha for more details.     Interval History  Patient identification: Hallie Fisher,  1945, with history of MONIK, anxiety, who presents today for follow-up of neck pain.    History of Present Illness  The patient is a 80-year-old female who presents today for follow-up of neck pain. She underwent trigger point injections approximately 3 weeks ago. She reports significant improvement in her neck pain following the trigger point injections, with the exception of one specific area in the right upper neck that remains slightly tight. She has discontinued her chin tuck regimen due to perceived irritation with that exercise, but continues to do her other exercises. She has not utilized any topical analgesics such as lidocaine or Voltaren. Her current medication regimen includes Celebrex, taken once daily in the morning, which she finds beneficial. She has two refills remaining of Celebrex. She does report even the area of ongoing pain is improved compared to before the trigger point injections.    She also mentions experiencing fatigue during pickleball games, particularly in hot weather conditions. She recalls an incident of heat stroke approximately 5 years ago during a tournament when the temperature reached 100 degrees. Since then, she has been more susceptible to heat-related symptoms. Despite these challenges, she continues to engage in regular physical activities such as pickleball.    MEDICATIONS  Celebrex    Procedures:  25: Trigger point  injections targeting bilateral cervical paraspinals, 100% improvement in left sided neck pain, 75% improvement in right sided neck pain      PMHx:   Past Medical History[1]    PSHx:   Past Surgical History[2]    Family history   Family History   Problem Relation Age of Onset    Lung Disease Mother         emphysema    Alcohol/Drug Mother     Alcohol/Drug Father     Stroke Brother        Medications:   Active Medications[3]     Medications Ordered Prior to Encounter[4]    Allergies:   Allergies[5]    Social Hx:   Social History     Socioeconomic History    Marital status:      Spouse name: Not on file    Number of children: Not on file    Years of education: Not on file    Highest education level: Associate degree: academic program   Occupational History    Not on file   Tobacco Use    Smoking status: Never    Smokeless tobacco: Never   Vaping Use    Vaping status: Never Used   Substance and Sexual Activity    Alcohol use: Yes    Drug use: No    Sexual activity: Yes     Partners: Male     Comment: ,  [employer],part time; no kids   Other Topics Concern    Not on file   Social History Narrative    Not on file     Social Drivers of Health     Financial Resource Strain: Low Risk  (9/12/2024)    Overall Financial Resource Strain (CARDIA)     Difficulty of Paying Living Expenses: Not hard at all   Food Insecurity: No Food Insecurity (9/12/2024)    Hunger Vital Sign     Worried About Running Out of Food in the Last Year: Never true     Ran Out of Food in the Last Year: Never true   Transportation Needs: No Transportation Needs (9/12/2024)    PRAPARE - Transportation     Lack of Transportation (Medical): No     Lack of Transportation (Non-Medical): No   Physical Activity: Sufficiently Active (9/12/2024)    Exercise Vital Sign     Days of Exercise per Week: 3 days     Minutes of Exercise per Session: 90 min   Stress: Stress Concern Present (9/12/2024)    [employer] -  "Occupational Stress Questionnaire     Feeling of Stress : To some extent   Social Connections: Moderately Isolated (9/12/2024)    Social Connection and Isolation Panel [NHANES]     Frequency of Communication with Friends and Family: Twice a week     Frequency of Social Gatherings with Friends and Family: Three times a week     Attends Yazdanism Services: Never     Active Member of Clubs or Organizations: No     Attends Club or Organization Meetings: Never     Marital Status:    Intimate Partner Violence: Not on file   Housing Stability: Low Risk  (9/12/2024)    Housing Stability Vital Sign     Unable to Pay for Housing in the Last Year: No     Number of Times Moved in the Last Year: 0     Homeless in the Last Year: No         EXAMINATION     Physical Exam:   Vitals: /49 (BP Location: Left arm, Patient Position: Sitting, BP Cuff Size: Adult)   Pulse 78   Temp 35.9 °C (96.7 °F) (Temporal)   Ht 1.549 m (5' 1\")   Wt 58.2 kg (128 lb 4.9 oz)   SpO2 95%       Constitutional:   Body Habitus: Body mass index is 24.24 kg/m².  Cooperation: Fully cooperates with exam  Appearance: Well-groomed, well-nourished  Respiratory:  Breathing comfortable on room air, no audible wheezing  Cardiovascular: Skin appears well-perfused  Psychiatric: Appropriate affect  Gait: normal gait, no use of ambulatory device, nonantalgic.       MEDICAL DECISION MAKING    DATA    Labs:   Lab Results   Component Value Date/Time    SODIUM 138 11/12/2024 07:39 AM    POTASSIUM 4.3 11/12/2024 07:39 AM    CHLORIDE 103 11/12/2024 07:39 AM    CO2 26 11/12/2024 07:39 AM    GLUCOSE 95 11/12/2024 07:39 AM    BUN 11 11/12/2024 07:39 AM    CREATININE 0.69 11/12/2024 07:39 AM    CREATININE 0.78 08/18/2009 01:59 PM    BUNCREATRAT 12 08/18/2009 01:59 PM    GLOMRATE >59 08/18/2009 01:59 PM        Lab Results   Component Value Date/Time    WBC 5.6 08/25/2023 06:05 AM    RBC 4.14 (L) 08/25/2023 06:05 AM    HEMOGLOBIN 12.9 08/25/2023 06:05 AM    HEMATOCRIT " 40.0 08/25/2023 06:05 AM    MCV 96.6 08/25/2023 06:05 AM    MCH 31.2 08/25/2023 06:05 AM    MCHC 32.3 08/25/2023 06:05 AM    MPV 9.8 08/25/2023 06:05 AM    NEUTSPOLYS 56.00 08/25/2023 06:05 AM    LYMPHOCYTES 33.90 08/25/2023 06:05 AM    MONOCYTES 7.70 08/25/2023 06:05 AM    EOSINOPHILS 1.30 08/25/2023 06:05 AM    BASOPHILS 0.70 08/25/2023 06:05 AM          Imaging:   Prior personal imaging review:  MRI Cervical Spine 3/3/25: Disc bulges at C3-4, C4-5, C5-6, C6-7. C3-4 mild canal and bilateral foraminal stenosis. C4-5 moderate canal and severe bilateral foraminal stenosis. C5-6 moderate canal and severe bilateral foraminal stenosis. C6-7 mild to moderate canal stenosis. Multilevel facet and uncovertebral joint arthropathy.        I reviewed the following radiology reports  MRI Cervical Spine 3/3/25:  FINDINGS:  Vertebral body height and alignment is well maintained throughout. There is no evidence of marrow edema. There is multilevel loss of the normal disc height and bright T2 disc signal. The spinal cord is normal in caliber and signal. There is mild anterior   subluxation at C4-5 and C6-7.     Findings at specific levels:     C2-3: No significant central canal or neural foraminal narrowing.     C3-4: There is annular disc bulge with posterior osseous spurring and bilateral uncinate and facet degeneration. There is underlying cord contact with mild central canal narrowing. There is mild bilateral neuroforaminal narrowing.     C4-5: There is annular disc bulge with posterior osseous spurring and bilateral uncinate and facet degeneration. There is underlying cord contact with moderate central canal narrowing. There is severe bilateral neural foraminal narrowing.     C5-6: There is annular disc bulge with posterior osseous spurring and bilateral uncinate and facet degeneration. There is underlying cord contact. There is moderate central canal narrowing. There is severe bilateral neural foraminal narrowing.     C6-7:  There is annular disc bulge and posterior osseous spurring and bilateral uncinate and facet degeneration. This underlying cord contact. There is moderate central canal narrowing. There is no neural foraminal narrowing.     C7-T1: No significant central canal or neural foraminal narrowing.     IMPRESSION:     1.  Multilevel degenerative disc disease, uncinate and facet degeneration results in mild central canal narrowing at C3-4 and moderate central canal narrowing at C4-5, C5-6 and C6-7.     2.  Varying degrees of neural foraminal narrowing at levels as specifically described above.      DIAGNOSIS   Visit Diagnoses     ICD-10-CM   1. Neck pain  M54.2   2. Facet arthropathy, cervical  M47.812         ASSESSMENT and PLAN:     Hallie Fisher is a 80 y.o. female who returns to clinic for follow-up of neck pain.    Hallie was seen today for follow-up.    Diagnoses and all orders for this visit:    Neck pain    Facet arthropathy, cervical        Assessment & Plan  Cervicalgia  Cervical facet arthropathy  Patient reports improvement in her chronic bilateral neck pain following trigger point injections performed approximately 3 weeks ago, except for one specific area on the right upper neck that remains tight. She has stopped certain exercises that seem to irritate the area, and does note the pain in this area is still improved compared to prior to the injections. She is currently taking Celebrex 100 mg once daily in the morning, which helps manage her symptoms. She is advised to continue her current regimen of Celebrex and maintain an active lifestyle including her pickleball. If she requires any medication refills, she should contact the office.      Follow up: In 2 months    Thank you for allowing me to participate in the care of this patient. If you have any questions please not hesitate to contact me.         Please note that this dictation was created using voice recognition software. I have made every  reasonable attempt to correct obvious errors but there may be errors of grammar and content that I may have overlooked prior to finalization of this note.    Kaylin Sinha MD  Interventional Spine and Sports Physiatry  Physical Medicine and Rehabilitation  [facility]         [1]   Past Medical History:  Diagnosis Date    Anesthesia     PONV    Anxiety 12/27/2022    Bereavement 11/08/2022    BPPV (benign paroxysmal positional vertigo)     Closed fracture of radius     History of meningioma 11/26/2019    Hyperlipidemia     Osteoarthritis of multiple joints 08/29/2022    Sleep apnea     CPAP - PMA    Tinnitus of both ears 09/07/2023    Torn rotator cuff     right.  has seen Omar and will have surge in 1/16   [2]   Past Surgical History:  Procedure Laterality Date    OK TOTAL HIP ARTHROPLASTY Left 5/9/2022    Procedure: LEFT ANTERIOR TOTAL HIP ARTHROPLASTY;  Surgeon: Juwan Espino M.D.;  Location: [facility];  Service: Orthopedics    SHOULDER DECOMPRESSION ARTHROSCOPIC Right 3/1/2016    Procedure: SHOULDER DECOMPRESSION ARTHROSCOPIC - SUBACROMIAL, LABRAL DEBRIDMENT;  Surgeon: Shama Nelson M.D.;  Location: [facility];  Service:     SHOULDER ARTHROSCOPY W/ ROTATOR CUFF REPAIR Right 3/1/2016    Procedure: SHOULDER ARTHROSCOPY W/ ROTATOR CUFF REPAIR;  Surgeon: Shama Nelson M.D.;  Location: [facility];  Service:     ORIF, WRIST  9/4/2013    Performed by Prasanna Anthony M.D. at [facility]    CARPAL TUNNEL RELEASE  9/4/2013    Performed by Prasanna Anthony M.D. at [facility]    KNEE ARTHROSCOPY Left 2009     - Dr. Newman; left    LUMPECTOMY Right 1970's    right breast cyst removal    TONSILLECTOMY  as child   [3]   Outpatient Medications Marked as Taking for the 5/23/25 encounter (Office Visit) with Kaylin Sinha M.D.   Medication Sig Dispense Refill    celecoxib (CELEBREX) 100 MG Cap Take 1 Capsule by mouth 2 times a  day as needed for Mild Pain or Moderate Pain. 60 Capsule 1    diazePAM (VALIUM) 5 MG Tab TAKE 1 TAB BY MOUTH EVERY 24 HOURS AS NEEDED FOR ANXIETY FOR UP TO 90 DAYS. 30 TABS TO LAST 90 DAYS      atorvastatin (LIPITOR) 10 MG Tab TAKE 1 TABLET BY MOUTH EVERY 48 HOURS. 100 Tablet 3    sertraline (ZOLOFT) 25 MG tablet Take 1 Tablet by mouth every day. 100 Tablet 3    Multiple Vitamins-Minerals (CENTRUM SILVER) TABS Take  by mouth every day.      Magnesium 400 MG CAPS Take  by mouth every 48 hours as needed.      Calcium Carbonate-Vitamin D (CALCIUM 600 + D PO) Take  by mouth every day.      Cholecalciferol (VITAMIN D) 2000 UNITS CAPS Take  by mouth every day.     [4]   Current Outpatient Medications on File Prior to Visit   Medication Sig Dispense Refill    celecoxib (CELEBREX) 100 MG Cap Take 1 Capsule by mouth 2 times a day as needed for Mild Pain or Moderate Pain. 60 Capsule 1    diazePAM (VALIUM) 5 MG Tab TAKE 1 TAB BY MOUTH EVERY 24 HOURS AS NEEDED FOR ANXIETY FOR UP TO 90 DAYS. 30 TABS TO LAST 90 DAYS      atorvastatin (LIPITOR) 10 MG Tab TAKE 1 TABLET BY MOUTH EVERY 48 HOURS. 100 Tablet 3    sertraline (ZOLOFT) 25 MG tablet Take 1 Tablet by mouth every day. 100 Tablet 3    Multiple Vitamins-Minerals (CENTRUM SILVER) TABS Take  by mouth every day.      Magnesium 400 MG CAPS Take  by mouth every 48 hours as needed.      Calcium Carbonate-Vitamin D (CALCIUM 600 + D PO) Take  by mouth every day.      Cholecalciferol (VITAMIN D) 2000 UNITS CAPS Take  by mouth every day.       No current facility-administered medications on file prior to visit.   [5]   Allergies  Allergen Reactions    Penicillins Hives

## 2025-05-23 ENCOUNTER — OFFICE VISIT (OUTPATIENT)
Dept: PHYSICAL MEDICINE AND REHAB | Facility: MEDICAL CENTER | Age: 80
End: 2025-05-23
Payer: MEDICARE

## 2025-05-23 VITALS
WEIGHT: 128.31 LBS | TEMPERATURE: 96.7 F | BODY MASS INDEX: 24.22 KG/M2 | OXYGEN SATURATION: 95 % | HEIGHT: 61 IN | HEART RATE: 78 BPM | SYSTOLIC BLOOD PRESSURE: 121 MMHG | DIASTOLIC BLOOD PRESSURE: 49 MMHG

## 2025-05-23 DIAGNOSIS — M54.2 NECK PAIN: Primary | ICD-10-CM

## 2025-05-23 DIAGNOSIS — M47.812 FACET ARTHROPATHY, CERVICAL: ICD-10-CM

## 2025-05-23 PROCEDURE — 99214 OFFICE O/P EST MOD 30 MIN: CPT | Performed by: STUDENT IN AN ORGANIZED HEALTH CARE EDUCATION/TRAINING PROGRAM

## 2025-05-23 PROCEDURE — 3074F SYST BP LT 130 MM HG: CPT | Performed by: STUDENT IN AN ORGANIZED HEALTH CARE EDUCATION/TRAINING PROGRAM

## 2025-05-23 PROCEDURE — 3078F DIAST BP <80 MM HG: CPT | Performed by: STUDENT IN AN ORGANIZED HEALTH CARE EDUCATION/TRAINING PROGRAM

## 2025-05-23 PROCEDURE — 1125F AMNT PAIN NOTED PAIN PRSNT: CPT | Performed by: STUDENT IN AN ORGANIZED HEALTH CARE EDUCATION/TRAINING PROGRAM

## 2025-05-23 ASSESSMENT — PATIENT HEALTH QUESTIONNAIRE - PHQ9: CLINICAL INTERPRETATION OF PHQ2 SCORE: 0

## 2025-05-23 ASSESSMENT — PAIN SCALES - GENERAL: PAINLEVEL_OUTOF10: 2=MINIMAL-SLIGHT

## 2025-05-23 ASSESSMENT — FIBROSIS 4 INDEX: FIB4 SCORE: 1.3

## 2025-07-22 NOTE — PROGRESS NOTES
Verbal consent was acquired by the patient to use [employer] ambient listening note generation during this visit Yes     FOLLOW-UP PATIENT VISIT    Interventional Spine and Pain  Physiatry (Physical Medicine and Rehabilitation)     Date of Service: 25   Chief Complaint:   Chief Complaint   Patient presents with    Follow-Up     Neck pain          Patient Name: Hallie Fisher   : 1945   MRN: [id number]       PRIOR HISTORY    Please see new patient note by Dr. Sinha for more details.     Interval History  Patient identification: Hallie Fisher,  1945, with history of MONIK, anxiety, who presents today for follow-up of neck pain.     History of Present Illness  The patient is a 80-year-old female who presents today for follow-up of neck pain. She was last seen approximately 2 months ago and reports that since this time, her neck pain has been largely stable, typically around 2 out of 10 in intensity and primarily located on the right side of the neck. She maintains an active lifestyle, engaging in pickleball three days a week and using a stationary bike at home nightly. She performs neck stretches with a towel and bands, although not daily. She reports no radiating pain, numbness, or tingling in her arms. She experiences difficulty when reversing her vehicle due to limited neck mobility when looking to the right. The most severe pain is localized on the right side of her neck, described as a throbbing sensation. She manages her pain with Celebrex 100 mg, taken once daily in the morning, without any side effects.      Procedures:  25: Trigger point injections targeting bilateral cervical paraspinals, 100% improvement in left sided neck pain, 75% improvement in right sided neck pain        PMHx:   Past Medical History[1]    PSHx:   Past Surgical History[2]    Family history   Family History   Problem Relation Age of Onset    Lung Disease Mother         emphysema    Alcohol/Drug Mother      Alcohol/Drug Father     Stroke Brother        Medications:   Active Medications[3]     Medications Ordered Prior to Encounter[4]    Allergies:   Allergies[5]    Social Hx:   Social History     Socioeconomic History    Marital status:      Spouse name: Not on file    Number of children: Not on file    Years of education: Not on file    Highest education level: Associate degree: academic program   Occupational History    Not on file   Tobacco Use    Smoking status: Never    Smokeless tobacco: Never   Vaping Use    Vaping status: Never Used   Substance and Sexual Activity    Alcohol use: Yes    Drug use: No    Sexual activity: Yes     Partners: Male     Comment: ,  [employer],part time; no kids   Other Topics Concern    Not on file   Social History Narrative    Not on file     Social Drivers of Health     Financial Resource Strain: Low Risk  (9/12/2024)    Overall Financial Resource Strain (CARDIA)     Difficulty of Paying Living Expenses: Not hard at all   Food Insecurity: No Food Insecurity (9/12/2024)    Hunger Vital Sign     Worried About Running Out of Food in the Last Year: Never true     Ran Out of Food in the Last Year: Never true   Transportation Needs: No Transportation Needs (9/12/2024)    PRAPARE - Transportation     Lack of Transportation (Medical): No     Lack of Transportation (Non-Medical): No   Physical Activity: Sufficiently Active (9/12/2024)    Exercise Vital Sign     Days of Exercise per Week: 3 days     Minutes of Exercise per Session: 90 min   Stress: Stress Concern Present (9/12/2024)    [employer] [place] [employer] - Occupational Stress Questionnaire     Feeling of Stress : To some extent   Social Connections: Moderately Isolated (9/12/2024)    Social Connection and Isolation Panel [NHANES]     Frequency of Communication with Friends and Family: Twice a week     Frequency of Social Gatherings with Friends and Family: Three times a week     Attends  "Mosque Services: Never     Active Member of Clubs or Organizations: No     Attends Club or Organization Meetings: Never     Marital Status:    Intimate Partner Violence: Not on file   Housing Stability: Low Risk  (9/12/2024)    Housing Stability Vital Sign     Unable to Pay for Housing in the Last Year: No     Number of Times Moved in the Last Year: 0     Homeless in the Last Year: No         EXAMINATION     Physical Exam:   Vitals: /70   Pulse 67   Temp 36.2 °C (97.1 °F) (Temporal)   Ht 1.549 m (5' 1\")   Wt 56.2 kg (123 lb 14.4 oz)   SpO2 97%       Constitutional:   Body Habitus: Body mass index is 23.41 kg/m².  Cooperation: Fully cooperates with exam  Appearance: Well-groomed, well-nourished  Respiratory:  Breathing comfortable on room air, no audible wheezing  Cardiovascular: Skin appears well-perfused  Psychiatric: Appropriate affect  Gait: normal gait, no use of ambulatory device, nonantalgic.       MEDICAL DECISION MAKING    DATA    Labs:   Lab Results   Component Value Date/Time    SODIUM 138 11/12/2024 07:39 AM    POTASSIUM 4.3 11/12/2024 07:39 AM    CHLORIDE 103 11/12/2024 07:39 AM    CO2 26 11/12/2024 07:39 AM    GLUCOSE 95 11/12/2024 07:39 AM    BUN 11 11/12/2024 07:39 AM    CREATININE 0.69 11/12/2024 07:39 AM    CREATININE 0.78 08/18/2009 01:59 PM    BUNCREATRAT 12 08/18/2009 01:59 PM    GLOMRATE >59 08/18/2009 01:59 PM        Lab Results   Component Value Date/Time    WBC 5.6 08/25/2023 06:05 AM    RBC 4.14 (L) 08/25/2023 06:05 AM    HEMOGLOBIN 12.9 08/25/2023 06:05 AM    HEMATOCRIT 40.0 08/25/2023 06:05 AM    MCV 96.6 08/25/2023 06:05 AM    MCH 31.2 08/25/2023 06:05 AM    MCHC 32.3 08/25/2023 06:05 AM    MPV 9.8 08/25/2023 06:05 AM    NEUTSPOLYS 56.00 08/25/2023 06:05 AM    LYMPHOCYTES 33.90 08/25/2023 06:05 AM    MONOCYTES 7.70 08/25/2023 06:05 AM    EOSINOPHILS 1.30 08/25/2023 06:05 AM    BASOPHILS 0.70 08/25/2023 06:05 AM         Imaging:   Prior personal imaging review:  MRI " Cervical Spine 3/3/25: Disc bulges at C3-4, C4-5, C5-6, C6-7. C3-4 mild canal and bilateral foraminal stenosis. C4-5 moderate canal and severe bilateral foraminal stenosis. C5-6 moderate canal and severe bilateral foraminal stenosis. C6-7 mild to moderate canal stenosis. Multilevel facet and uncovertebral joint arthropathy.        I reviewed the following radiology reports  MRI Cervical Spine 3/3/25:  FINDINGS:  Vertebral body height and alignment is well maintained throughout. There is no evidence of marrow edema. There is multilevel loss of the normal disc height and bright T2 disc signal. The spinal cord is normal in caliber and signal. There is mild anterior   subluxation at C4-5 and C6-7.     Findings at specific levels:     C2-3: No significant central canal or neural foraminal narrowing.     C3-4: There is annular disc bulge with posterior osseous spurring and bilateral uncinate and facet degeneration. There is underlying cord contact with mild central canal narrowing. There is mild bilateral neuroforaminal narrowing.     C4-5: There is annular disc bulge with posterior osseous spurring and bilateral uncinate and facet degeneration. There is underlying cord contact with moderate central canal narrowing. There is severe bilateral neural foraminal narrowing.     C5-6: There is annular disc bulge with posterior osseous spurring and bilateral uncinate and facet degeneration. There is underlying cord contact. There is moderate central canal narrowing. There is severe bilateral neural foraminal narrowing.     C6-7: There is annular disc bulge and posterior osseous spurring and bilateral uncinate and facet degeneration. This underlying cord contact. There is moderate central canal narrowing. There is no neural foraminal narrowing.     C7-T1: No significant central canal or neural foraminal narrowing.     IMPRESSION:     1.  Multilevel degenerative disc disease, uncinate and facet degeneration results in mild central  canal narrowing at C3-4 and moderate central canal narrowing at C4-5, C5-6 and C6-7.     2.  Varying degrees of neural foraminal narrowing at levels as specifically described above.      DIAGNOSIS   Visit Diagnoses     ICD-10-CM   1. Myalgia  M79.10   2. Cervicalgia  M54.2         ASSESSMENT and PLAN:     Hallie Fisher is a 80 y.o. female who returns to clinic for follow-up of neck pain.    Hallie was seen today for follow-up.    Diagnoses and all orders for this visit:    Myalgia  -     Trigger Point Injections; Future    Cervicalgia        Assessment & Plan  Neck pain  Myofascial neck pain  Patient's chronic mild to moderate neck pain has remained largely stable, typically around 2 out of 10 in intensity but worse at times especially on the right side with cervical rotation. The patient reports no issues or side effects with Celebrex 100 mg daily which will be continued. She will continue to perform stretches and exercises at home targeting the neck. We discussed that trigger point injections can be repeated as soon as 9/2025 and we will order these today with plan to target the right cervical paraspinals and upper trapezius.        Follow up: In September for trigger point injections targeting the right cervical paraspinals and upper trapezius    Thank you for allowing me to participate in the care of this patient. If you have any questions please not hesitate to contact me.         Please note that this dictation was created using voice recognition software. I have made every reasonable attempt to correct obvious errors but there may be errors of grammar and content that I may have overlooked prior to finalization of this note.    Kaylin Sinha MD  Interventional Spine and Sports Physiatry  Physical Medicine and Rehabilitation  [facility]         [1]   Past Medical History:  Diagnosis Date    Anesthesia     PONV    Anxiety 12/27/2022    Bereavement 11/08/2022    BPPV (benign paroxysmal  positional vertigo)     Closed fracture of radius     History of meningioma 11/26/2019    Hyperlipidemia     Osteoarthritis of multiple joints 08/29/2022    Sleep apnea     CPAP - PMA    Tinnitus of both ears 09/07/2023    Torn rotator cuff     right.  has seen Omar and will have praveen in 1/16   [2]   Past Surgical History:  Procedure Laterality Date    SD TOTAL HIP ARTHROPLASTY Left 5/9/2022    Procedure: LEFT ANTERIOR TOTAL HIP ARTHROPLASTY;  Surgeon: Juwan Espino M.D.;  Location: [facility];  Service: Orthopedics    SHOULDER DECOMPRESSION ARTHROSCOPIC Right 3/1/2016    Procedure: SHOULDER DECOMPRESSION ARTHROSCOPIC - SUBACROMIAL, LABRAL DEBRIDMENT;  Surgeon: Shama Nelson M.D.;  Location: SURGERY [facility];  Service:     SHOULDER ARTHROSCOPY W/ ROTATOR CUFF REPAIR Right 3/1/2016    Procedure: SHOULDER ARTHROSCOPY W/ ROTATOR CUFF REPAIR;  Surgeon: Shama Nelson M.D.;  Location: SURGERY [facility];  Service:     ORIF, WRIST  9/4/2013    Performed by Prasanna Anthony M.D. at [facility]    CARPAL TUNNEL RELEASE  9/4/2013    Performed by Prasanna Anthony M.D. at [facility]    KNEE ARTHROSCOPY Left 2009     - Dr. Newman; left    LUMPECTOMY Right 1970's    right breast cyst removal    TONSILLECTOMY  as child   [3]   Outpatient Medications Marked as Taking for the 7/23/25 encounter (Office Visit) with Kaylin Sinha M.D.   Medication Sig Dispense Refill    celecoxib (CELEBREX) 100 MG Cap Take 1 Capsule by mouth 2 times a day as needed for Mild Pain or Moderate Pain. 60 Capsule 1    diazePAM (VALIUM) 5 MG Tab TAKE 1 TAB BY MOUTH EVERY 24 HOURS AS NEEDED FOR ANXIETY FOR UP TO 90 DAYS. 30 TABS TO LAST 90 DAYS      atorvastatin (LIPITOR) 10 MG Tab TAKE 1 TABLET BY MOUTH EVERY 48 HOURS. 100 Tablet 3    sertraline (ZOLOFT) 25 MG tablet Take 1 Tablet by mouth every day. 100 Tablet 3    Multiple Vitamins-Minerals (CENTRUM SILVER) TABS Take  by  mouth every day.      Magnesium 400 MG CAPS Take  by mouth every 48 hours as needed.      Calcium Carbonate-Vitamin D (CALCIUM 600 + D PO) Take  by mouth every day.      Cholecalciferol (VITAMIN D) 2000 UNITS CAPS Take  by mouth every day.     [4]   Current Outpatient Medications on File Prior to Visit   Medication Sig Dispense Refill    celecoxib (CELEBREX) 100 MG Cap Take 1 Capsule by mouth 2 times a day as needed for Mild Pain or Moderate Pain. 60 Capsule 1    diazePAM (VALIUM) 5 MG Tab TAKE 1 TAB BY MOUTH EVERY 24 HOURS AS NEEDED FOR ANXIETY FOR UP TO 90 DAYS. 30 TABS TO LAST 90 DAYS      atorvastatin (LIPITOR) 10 MG Tab TAKE 1 TABLET BY MOUTH EVERY 48 HOURS. 100 Tablet 3    sertraline (ZOLOFT) 25 MG tablet Take 1 Tablet by mouth every day. 100 Tablet 3    Multiple Vitamins-Minerals (CENTRUM SILVER) TABS Take  by mouth every day.      Magnesium 400 MG CAPS Take  by mouth every 48 hours as needed.      Calcium Carbonate-Vitamin D (CALCIUM 600 + D PO) Take  by mouth every day.      Cholecalciferol (VITAMIN D) 2000 UNITS CAPS Take  by mouth every day.       No current facility-administered medications on file prior to visit.   [5]   Allergies  Allergen Reactions    Penicillins Hives

## 2025-07-23 ENCOUNTER — OFFICE VISIT (OUTPATIENT)
Dept: PHYSICAL MEDICINE AND REHAB | Facility: MEDICAL CENTER | Age: 80
End: 2025-07-23
Payer: MEDICARE

## 2025-07-23 VITALS
TEMPERATURE: 97.1 F | HEIGHT: 61 IN | OXYGEN SATURATION: 97 % | SYSTOLIC BLOOD PRESSURE: 128 MMHG | HEART RATE: 67 BPM | BODY MASS INDEX: 23.39 KG/M2 | DIASTOLIC BLOOD PRESSURE: 70 MMHG | WEIGHT: 123.9 LBS

## 2025-07-23 DIAGNOSIS — M79.10 MYALGIA: Primary | ICD-10-CM

## 2025-07-23 DIAGNOSIS — M54.2 CERVICALGIA: ICD-10-CM

## 2025-07-23 PROCEDURE — 1125F AMNT PAIN NOTED PAIN PRSNT: CPT | Performed by: STUDENT IN AN ORGANIZED HEALTH CARE EDUCATION/TRAINING PROGRAM

## 2025-07-23 PROCEDURE — 3078F DIAST BP <80 MM HG: CPT | Performed by: STUDENT IN AN ORGANIZED HEALTH CARE EDUCATION/TRAINING PROGRAM

## 2025-07-23 PROCEDURE — 99214 OFFICE O/P EST MOD 30 MIN: CPT | Performed by: STUDENT IN AN ORGANIZED HEALTH CARE EDUCATION/TRAINING PROGRAM

## 2025-07-23 PROCEDURE — 3074F SYST BP LT 130 MM HG: CPT | Performed by: STUDENT IN AN ORGANIZED HEALTH CARE EDUCATION/TRAINING PROGRAM

## 2025-07-23 ASSESSMENT — PAIN SCALES - GENERAL: PAINLEVEL_OUTOF10: 2=MINIMAL-SLIGHT

## 2025-07-23 ASSESSMENT — PATIENT HEALTH QUESTIONNAIRE - PHQ9: CLINICAL INTERPRETATION OF PHQ2 SCORE: 0

## 2025-07-23 ASSESSMENT — FIBROSIS 4 INDEX: FIB4 SCORE: 1.3

## 2025-09-03 ENCOUNTER — APPOINTMENT (OUTPATIENT)
Dept: PHYSICAL MEDICINE AND REHAB | Facility: MEDICAL CENTER | Age: 80
End: 2025-09-03
Attending: STUDENT IN AN ORGANIZED HEALTH CARE EDUCATION/TRAINING PROGRAM
Payer: MEDICARE

## 2025-10-09 ENCOUNTER — APPOINTMENT (OUTPATIENT)
Dept: MEDICAL GROUP | Facility: MEDICAL CENTER | Age: 80
End: 2025-10-09
Payer: MEDICARE